# Patient Record
Sex: FEMALE | Race: WHITE | NOT HISPANIC OR LATINO | Employment: FULL TIME | URBAN - METROPOLITAN AREA
[De-identification: names, ages, dates, MRNs, and addresses within clinical notes are randomized per-mention and may not be internally consistent; named-entity substitution may affect disease eponyms.]

---

## 2017-05-30 DIAGNOSIS — Z36.89 ENCOUNTER TO ESTABLISH GESTATIONAL AGE USING ULTRASOUND: Primary | ICD-10-CM

## 2017-06-19 ENCOUNTER — INITIAL PRENATAL (OUTPATIENT)
Dept: OBSTETRICS AND GYNECOLOGY | Facility: CLINIC | Age: 22
End: 2017-06-19

## 2017-06-19 ENCOUNTER — APPOINTMENT (OUTPATIENT)
Dept: LAB | Facility: HOSPITAL | Age: 22
End: 2017-06-19
Attending: OBSTETRICS & GYNECOLOGY

## 2017-06-19 VITALS — WEIGHT: 192 LBS | DIASTOLIC BLOOD PRESSURE: 60 MMHG | SYSTOLIC BLOOD PRESSURE: 100 MMHG

## 2017-06-19 DIAGNOSIS — Z36.9 PRENATAL SCREENING ENCOUNTER: Primary | ICD-10-CM

## 2017-06-19 DIAGNOSIS — Z01.419 WELL WOMAN EXAM: ICD-10-CM

## 2017-06-19 DIAGNOSIS — O21.9 NAUSEA AND VOMITING IN PREGNANCY PRIOR TO 22 WEEKS GESTATION: ICD-10-CM

## 2017-06-19 DIAGNOSIS — Z34.01 ENCOUNTER FOR SUPERVISION OF NORMAL FIRST PREGNANCY IN FIRST TRIMESTER: ICD-10-CM

## 2017-06-19 LAB
ABO GROUP BLD: NORMAL
AMORPH URATE CRY URNS QL MICRO: ABNORMAL /HPF
BACTERIA UR QL AUTO: ABNORMAL /HPF
BASOPHILS # BLD AUTO: 0.01 10*3/MM3 (ref 0–0.2)
BASOPHILS NFR BLD AUTO: 0.1 % (ref 0–1)
BILIRUB UR QL STRIP: NEGATIVE
BLD GP AB SCN SERPL QL: NEGATIVE
CLARITY UR: ABNORMAL
COLOR UR: YELLOW
DEPRECATED RDW RBC AUTO: 42.9 FL (ref 37–54)
EOSINOPHIL # BLD AUTO: 0.06 10*3/MM3 (ref 0.1–0.3)
EOSINOPHIL NFR BLD AUTO: 0.7 % (ref 0–3)
ERYTHROCYTE [DISTWIDTH] IN BLOOD BY AUTOMATED COUNT: 13.3 % (ref 11.3–14.5)
EXTERNAL ABO GROUPING: NORMAL
EXTERNAL HEPATITIS B SURFACE ANTIGEN: NEGATIVE
EXTERNAL RH FACTOR: POSITIVE
EXTERNAL RUBELLA QUALITATIVE: NORMAL
EXTERNAL SYPHILIS RPR SCREEN: NORMAL
GLUCOSE UR STRIP-MCNC: NEGATIVE MG/DL
HBA1C MFR BLD: 5.3 % (ref 4.8–5.6)
HBV SURFACE AG SERPL QL IA: NORMAL
HCT VFR BLD AUTO: 38.2 % (ref 34.5–44)
HCV AB SER DONR QL: NORMAL
HGB BLD-MCNC: 12.8 G/DL (ref 11.5–15.5)
HGB UR QL STRIP.AUTO: NEGATIVE
HIV1 P24 AG SERPL QL IA: NORMAL
HIV1+2 AB SER QL: NORMAL
HYALINE CASTS UR QL AUTO: ABNORMAL /LPF
IMM GRANULOCYTES # BLD: 0.02 10*3/MM3 (ref 0–0.03)
IMM GRANULOCYTES NFR BLD: 0.2 % (ref 0–0.6)
KETONES UR QL STRIP: NEGATIVE
LEUKOCYTE ESTERASE UR QL STRIP.AUTO: ABNORMAL
LYMPHOCYTES # BLD AUTO: 2.18 10*3/MM3 (ref 0.6–4.8)
LYMPHOCYTES NFR BLD AUTO: 25.3 % (ref 24–44)
MCH RBC QN AUTO: 29.4 PG (ref 27–31)
MCHC RBC AUTO-ENTMCNC: 33.5 G/DL (ref 32–36)
MCV RBC AUTO: 87.6 FL (ref 80–99)
MONOCYTES # BLD AUTO: 0.77 10*3/MM3 (ref 0–1)
MONOCYTES NFR BLD AUTO: 8.9 % (ref 0–12)
NEUTROPHILS # BLD AUTO: 5.57 10*3/MM3 (ref 1.5–8.3)
NEUTROPHILS NFR BLD AUTO: 64.8 % (ref 41–71)
NITRITE UR QL STRIP: NEGATIVE
PH UR STRIP.AUTO: 7 [PH] (ref 5–8)
PLATELET # BLD AUTO: 285 10*3/MM3 (ref 150–450)
PMV BLD AUTO: 9.7 FL (ref 6–12)
PROT UR QL STRIP: NEGATIVE
RBC # BLD AUTO: 4.36 10*6/MM3 (ref 3.89–5.14)
RBC # UR: ABNORMAL /HPF
REF LAB TEST METHOD: ABNORMAL
RH BLD: POSITIVE
RUBV IGG SER QL: NORMAL
RUBV IGG SER-ACNC: 10.9 IU/ML
SP GR UR STRIP: 1.02 (ref 1–1.03)
SQUAMOUS #/AREA URNS HPF: ABNORMAL /HPF
TSH SERPL DL<=0.05 MIU/L-ACNC: 1.76 MIU/ML (ref 0.35–5.35)
UROBILINOGEN UR QL STRIP: ABNORMAL
WBC NRBC COR # BLD: 8.61 10*3/MM3 (ref 4.5–13.5)
WBC UR QL AUTO: ABNORMAL /HPF

## 2017-06-19 PROCEDURE — 83036 HEMOGLOBIN GLYCOSYLATED A1C: CPT | Performed by: OBSTETRICS & GYNECOLOGY

## 2017-06-19 PROCEDURE — G0432 EIA HIV-1/HIV-2 SCREEN: HCPCS | Performed by: OBSTETRICS & GYNECOLOGY

## 2017-06-19 PROCEDURE — 87086 URINE CULTURE/COLONY COUNT: CPT | Performed by: OBSTETRICS & GYNECOLOGY

## 2017-06-19 PROCEDURE — 81001 URINALYSIS AUTO W/SCOPE: CPT | Performed by: OBSTETRICS & GYNECOLOGY

## 2017-06-19 PROCEDURE — 87340 HEPATITIS B SURFACE AG IA: CPT | Performed by: OBSTETRICS & GYNECOLOGY

## 2017-06-19 PROCEDURE — 99204 OFFICE O/P NEW MOD 45 MIN: CPT | Performed by: OBSTETRICS & GYNECOLOGY

## 2017-06-19 PROCEDURE — 85025 COMPLETE CBC W/AUTO DIFF WBC: CPT | Performed by: OBSTETRICS & GYNECOLOGY

## 2017-06-19 PROCEDURE — 84443 ASSAY THYROID STIM HORMONE: CPT | Performed by: OBSTETRICS & GYNECOLOGY

## 2017-06-19 PROCEDURE — 86901 BLOOD TYPING SEROLOGIC RH(D): CPT | Performed by: OBSTETRICS & GYNECOLOGY

## 2017-06-19 PROCEDURE — 86900 BLOOD TYPING SEROLOGIC ABO: CPT | Performed by: OBSTETRICS & GYNECOLOGY

## 2017-06-19 PROCEDURE — 86803 HEPATITIS C AB TEST: CPT | Performed by: OBSTETRICS & GYNECOLOGY

## 2017-06-19 PROCEDURE — 80081 OBSTETRIC PANEL INC HIV TSTG: CPT | Performed by: OBSTETRICS & GYNECOLOGY

## 2017-06-19 PROCEDURE — 36415 COLL VENOUS BLD VENIPUNCTURE: CPT | Performed by: OBSTETRICS & GYNECOLOGY

## 2017-06-19 PROCEDURE — 86850 RBC ANTIBODY SCREEN: CPT | Performed by: OBSTETRICS & GYNECOLOGY

## 2017-06-19 RX ORDER — PRENATAL VIT/IRON FUM/FOLIC AC 27MG-0.8MG
1 TABLET ORAL DAILY
Qty: 30 TABLET | Refills: 12 | Status: SHIPPED | OUTPATIENT
Start: 2017-06-19 | End: 2018-03-12

## 2017-06-19 RX ORDER — ALBUTEROL SULFATE 2.5 MG/3ML
2.5 SOLUTION RESPIRATORY (INHALATION) EVERY 4 HOURS PRN
COMMUNITY

## 2017-06-19 RX ORDER — PROMETHAZINE HYDROCHLORIDE 25 MG/1
25 TABLET ORAL EVERY 6 HOURS PRN
Qty: 30 TABLET | Refills: 3 | Status: SHIPPED | OUTPATIENT
Start: 2017-06-19 | End: 2017-09-14

## 2017-06-19 NOTE — PROGRESS NOTES
@SUBJECTIVEBEGIN  Chief Complaint   Patient presents with   • Initial Prenatal Visit   • Nausea       Isidra Solano is a 22 y.o. year old .  Patient's last menstrual period was 2017 (exact date)..  She presents to be seen to initiate prenatal care.    She is currently having problems with nausea and occasional vomiting  As it relates to the pregnancy, she has no other  concerns    Past Medical History:   Diagnosis Date   • Asthma    ,   Past Surgical History:   Procedure Laterality Date   • CHOLECYSTECTOMY     ,   Family History   Problem Relation Age of Onset   • Coronary artery disease Father    • Hypertension Father    • Diabetes Paternal Grandfather    • Breast cancer Paternal Grandmother    • Colon cancer Maternal Grandmother    • Ovarian cancer Maternal Aunt    ,   Social History   Substance Use Topics   • Smoking status: Never Smoker   • Smokeless tobacco: None   • Alcohol use No   ,   (Not in a hospital admission) and Allergies:  Review of patient's allergies indicates no known allergies.    Smoking status: Never Smoker                                                                 Smokeless status: Not on file                         The following portions of the patient's history were reviewed and updated as appropriate:vital signs, allergies, current medications, past medical history, past social history, past surgical history and problem list.    Objective     Imaging   No data reviewed    Review of Systems - History obtained from the patient  General ROS: negative  Psychological ROS: negative  ENT ROS: positive for - nose bleeds  Allergy and Immunology ROS: negative  Hematological and Lymphatic ROS: positive for - bruising  Endocrine ROS: negative  Breast ROS: negative for breast lumps  Respiratory ROS: positive for - wheezing  Cardiovascular ROS: no chest pain or dyspnea on exertion  Gastrointestinal ROS: positive for - nausea/vomiting  Genito-Urinary ROS: no dysuria, trouble voiding, or  hematuria  Musculoskeletal ROS: negative  Neurological ROS: positive for - history of syncope   Dermatological ROS: negative     Physical Exam:  See Episode    Assessment/Plan   ASSESSMENT  Isidra was seen today for initial prenatal visit and nausea.    Diagnoses and all orders for this visit:    Prenatal screening encounter  -     Obstetric Panel  -     HIV-1 / O / 2 Ag / Antibody 4th Generation  -     Chlamydia trachomatis, Neisseria gonorrhoeae, Trichomonas vaginalis, PCR  -     Hemoglobin A1c  -     Hepatitis C Antibody  -     TSH  -     Urinalysis With / Culture If Indicated    Well woman exam  -     Liquid-based Pap Smear, Screening    Encounter for supervision of normal first pregnancy in first trimester    Nausea and vomiting in pregnancy prior to 22 weeks gestation  -     promethazine (PHENERGAN) 25 MG tablet; Take 1 tablet by mouth Every 6 (Six) Hours As Needed for Nausea or Vomiting.    Other orders  -     Cancel: Varicella Zoster Antibody, IgG; Future  -     Cancel: Urinalysis With Microscopic; Future  -     Cancel: Urine Culture; Future  -     Cancel: IGP, CtNg, Rfx Aptima HPV ASCU; Future  -     Prenatal Vit-Fe Fumarate-FA (PRENATAL VITAMIN 27-0.8) 27-0.8 MG tablet tablet; Take 1 tablet by mouth Daily.        PLAN  1. Tests ordered today:  Orders Placed This Encounter   Procedures   • Chlamydia trachomatis, Neisseria gonorrhoeae, Trichomonas vaginalis, PCR   • Obstetric Panel   • HIV-1 / O / 2 Ag / Antibody 4th Generation   • Hemoglobin A1c   • Hepatitis C Antibody   • TSH   • Urinalysis With / Culture If Indicated     2. Medications prescribed today:  New Medications Ordered This Visit   Medications   • albuterol (PROVENTIL) (2.5 MG/3ML) 0.083% nebulizer solution     Sig: Take 2.5 mg by nebulization Every 4 (Four) Hours As Needed for Wheezing.   • Prenatal Vit-Fe Fumarate-FA (PRENATAL VITAMIN 27-0.8) 27-0.8 MG tablet tablet     Sig: Take 1 tablet by mouth Daily.     Dispense:  30 tablet     Refill:   12   • promethazine (PHENERGAN) 25 MG tablet     Sig: Take 1 tablet by mouth Every 6 (Six) Hours As Needed for Nausea or Vomiting.     Dispense:  30 tablet     Refill:  3     3. Information reviewed: exercise in pregnancy, nutrition in pregnancy, weight gain in pregnancy, work and travel restrictions during pregnancy, list of OTC medications acceptable in pregnancy and call coverage groups  4. Genetic testing reviewed: unsure  5. The problem list for pregnancy was initiated today    Follow up: 2 week(s)       This note was electronically signed.    Wilber Gomez MD   June 19, 2017

## 2017-06-21 LAB
BACTERIA SPEC AEROBE CULT: NORMAL
RPR SER QL: NORMAL

## 2017-07-05 ENCOUNTER — ROUTINE PRENATAL (OUTPATIENT)
Dept: OBSTETRICS AND GYNECOLOGY | Facility: CLINIC | Age: 22
End: 2017-07-05

## 2017-07-05 VITALS — WEIGHT: 193 LBS | DIASTOLIC BLOOD PRESSURE: 70 MMHG | SYSTOLIC BLOOD PRESSURE: 124 MMHG

## 2017-07-05 DIAGNOSIS — Z34.01 ENCOUNTER FOR SUPERVISION OF NORMAL FIRST PREGNANCY IN FIRST TRIMESTER: Primary | ICD-10-CM

## 2017-07-05 DIAGNOSIS — O21.9 NAUSEA AND VOMITING IN PREGNANCY PRIOR TO 22 WEEKS GESTATION: ICD-10-CM

## 2017-07-05 PROCEDURE — 99213 OFFICE O/P EST LOW 20 MIN: CPT | Performed by: OBSTETRICS & GYNECOLOGY

## 2017-07-05 NOTE — PROGRESS NOTES
No results found for: ABORH, LABANTI, ABID    Chief Complaint   Patient presents with   • Routine Prenatal Visit     No complaints. Nausea better       Today Isidra has no specific complaints  See ob flowsheet for physical exam.    Prenatal Assessment  Fetal Heart Rate: 176  Movement: Absent  Prenatal Vitals  BP: 124/70  Weight: 193 lb (87.5 kg)  Edema  LLE Edema: None  RLE Edema: None  Facial Edema: None     Tests done today: none  At the time of the next visit, she will need to have none    Impression:   Encounter Diagnoses   Name Primary?   • Encounter for supervision of normal first pregnancy in first trimester Yes   • Nausea and vomiting in pregnancy prior to 22 weeks gestation        Plan: Return 4 weeks    This note was electronically signed.    Wilber Gomez MD

## 2017-08-02 ENCOUNTER — ROUTINE PRENATAL (OUTPATIENT)
Dept: OBSTETRICS AND GYNECOLOGY | Facility: CLINIC | Age: 22
End: 2017-08-02

## 2017-08-02 VITALS — DIASTOLIC BLOOD PRESSURE: 78 MMHG | WEIGHT: 195 LBS | SYSTOLIC BLOOD PRESSURE: 140 MMHG

## 2017-08-02 DIAGNOSIS — Z34.01 ENCOUNTER FOR SUPERVISION OF NORMAL FIRST PREGNANCY IN FIRST TRIMESTER: Primary | ICD-10-CM

## 2017-08-02 DIAGNOSIS — R51.9 PREGNANCY HEADACHE IN FIRST TRIMESTER: ICD-10-CM

## 2017-08-02 DIAGNOSIS — O26.891 PREGNANCY HEADACHE IN FIRST TRIMESTER: ICD-10-CM

## 2017-08-02 PROCEDURE — 99213 OFFICE O/P EST LOW 20 MIN: CPT | Performed by: OBSTETRICS & GYNECOLOGY

## 2017-08-02 NOTE — PROGRESS NOTES
No results found for: ABORH, LABANTI, ABID    Chief Complaint   Patient presents with   • Routine Prenatal Visit     Pt. complains of headaches/migraines.  She states she has had them before she was pregnant and can no longer take the medication she was on because of pregnancy.  Tylenol does not work well for the headaches.        Today Isidra complains of headache  See ob flowsheet for physical exam.    Prenatal Assessment  Fetal Heart Rate: 160  Movement: Absent  Prenatal Vitals  BP: 140/78  Weight: 195 lb (88.5 kg)  Urine Glucose/Protein  Urine Glucose: Negative  Urine Protein: Negative     Tests done today: none  At the time of the next visit, she will need to have none    Impression:   Encounter Diagnoses   Name Primary?   • Encounter for supervision of normal first pregnancy in first trimester Yes   • Pregnancy headache in first trimester        Plan: Return 2 weeks, will refer to Neurology for suggestions     This note was electronically signed.    Wilber Gomez MD

## 2017-08-07 ENCOUNTER — HOSPITAL ENCOUNTER (EMERGENCY)
Facility: HOSPITAL | Age: 22
Discharge: LEFT BEFORE DISCHARGE | End: 2017-08-07

## 2017-08-07 VITALS
HEART RATE: 77 BPM | RESPIRATION RATE: 18 BRPM | HEIGHT: 66 IN | WEIGHT: 192 LBS | BODY MASS INDEX: 30.86 KG/M2 | SYSTOLIC BLOOD PRESSURE: 120 MMHG | DIASTOLIC BLOOD PRESSURE: 71 MMHG | OXYGEN SATURATION: 99 % | TEMPERATURE: 98.1 F

## 2017-08-07 LAB
HOLD SPECIMEN: NORMAL
HOLD SPECIMEN: NORMAL
WHOLE BLOOD HOLD SPECIMEN: NORMAL
WHOLE BLOOD HOLD SPECIMEN: NORMAL

## 2017-08-07 PROCEDURE — 99211 OFF/OP EST MAY X REQ PHY/QHP: CPT

## 2017-08-17 ENCOUNTER — ROUTINE PRENATAL (OUTPATIENT)
Dept: OBSTETRICS AND GYNECOLOGY | Facility: CLINIC | Age: 22
End: 2017-08-17

## 2017-08-17 VITALS — WEIGHT: 199 LBS | BODY MASS INDEX: 32.12 KG/M2 | SYSTOLIC BLOOD PRESSURE: 120 MMHG | DIASTOLIC BLOOD PRESSURE: 70 MMHG

## 2017-08-17 DIAGNOSIS — Z36.89 ENCOUNTER FOR FETAL ANATOMIC SURVEY: ICD-10-CM

## 2017-08-17 DIAGNOSIS — O22.42 HEMORRHOIDS DURING PREGNANCY IN SECOND TRIMESTER: ICD-10-CM

## 2017-08-17 DIAGNOSIS — R51.9 PREGNANCY HEADACHE IN FIRST TRIMESTER: ICD-10-CM

## 2017-08-17 DIAGNOSIS — Z34.02 ENCOUNTER FOR SUPERVISION OF NORMAL FIRST PREGNANCY IN SECOND TRIMESTER: Primary | ICD-10-CM

## 2017-08-17 DIAGNOSIS — O26.891 PREGNANCY HEADACHE IN FIRST TRIMESTER: ICD-10-CM

## 2017-08-17 PROCEDURE — 99213 OFFICE O/P EST LOW 20 MIN: CPT | Performed by: OBSTETRICS & GYNECOLOGY

## 2017-08-17 NOTE — PROGRESS NOTES
No results found for: ABORH, LABANTI, ABID    Chief Complaint   Patient presents with   • Routine Prenatal Visit     C/O headaches and hemorroids       Today Isidra complains of headache and hemorrhoids but improving  See ob flowsheet for physical exam.    Prenatal Assessment  Fetal Heart Rate: 148  Movement: Absent  Prenatal Vitals  BP: 120/70  Weight: 199 lb (90.3 kg)  Urine Glucose/Protein  Urine Glucose: Negative  Urine Protein: Trace  Edema  LLE Edema: None  RLE Edema: None  Facial Edema: None     Tests done today: none  At the time of the next visit, she will need to have U/S for anatomic screening - at [facility]    Impression:   Encounter Diagnoses   Name Primary?   • Encounter for supervision of normal first pregnancy in second trimester Yes   • Pregnancy headache in first trimester    • Hemorrhoids during pregnancy in second trimester    • Encounter for fetal anatomic survey        Plan: Return 4 weeks, ultrasound at [facility] on return, both hemorrhoids and headaches are improving.  Patient has appointment with neurology on 9/14/2017.    This note was electronically signed.    Wilber Gomez MD

## 2017-09-14 ENCOUNTER — OFFICE VISIT (OUTPATIENT)
Dept: OBSTETRICS AND GYNECOLOGY | Facility: HOSPITAL | Age: 22
End: 2017-09-14

## 2017-09-14 ENCOUNTER — OFFICE VISIT (OUTPATIENT)
Dept: NEUROLOGY | Facility: CLINIC | Age: 22
End: 2017-09-14

## 2017-09-14 ENCOUNTER — ROUTINE PRENATAL (OUTPATIENT)
Dept: OBSTETRICS AND GYNECOLOGY | Facility: CLINIC | Age: 22
End: 2017-09-14

## 2017-09-14 ENCOUNTER — HOSPITAL ENCOUNTER (OUTPATIENT)
Dept: WOMENS IMAGING | Facility: HOSPITAL | Age: 22
Discharge: HOME | End: 2017-09-14
Attending: OBSTETRICS & GYNECOLOGY | Admitting: OBSTETRICS & GYNECOLOGY

## 2017-09-14 VITALS
BODY MASS INDEX: 33.27 KG/M2 | HEIGHT: 66 IN | WEIGHT: 207 LBS | DIASTOLIC BLOOD PRESSURE: 70 MMHG | SYSTOLIC BLOOD PRESSURE: 116 MMHG | RESPIRATION RATE: 16 BRPM | HEART RATE: 88 BPM

## 2017-09-14 VITALS — DIASTOLIC BLOOD PRESSURE: 78 MMHG | BODY MASS INDEX: 33.41 KG/M2 | WEIGHT: 207 LBS | SYSTOLIC BLOOD PRESSURE: 129 MMHG

## 2017-09-14 VITALS — BODY MASS INDEX: 33.41 KG/M2 | SYSTOLIC BLOOD PRESSURE: 124 MMHG | DIASTOLIC BLOOD PRESSURE: 80 MMHG | WEIGHT: 207 LBS

## 2017-09-14 DIAGNOSIS — G43.709 CHRONIC MIGRAINE WITHOUT AURA WITHOUT STATUS MIGRAINOSUS, NOT INTRACTABLE: Primary | ICD-10-CM

## 2017-09-14 DIAGNOSIS — Z34.02 ENCOUNTER FOR SUPERVISION OF NORMAL FIRST PREGNANCY IN SECOND TRIMESTER: Primary | ICD-10-CM

## 2017-09-14 DIAGNOSIS — Z34.90 PREGNANCY, UNSPECIFIED GESTATIONAL AGE: Primary | ICD-10-CM

## 2017-09-14 DIAGNOSIS — Z36.89 ENCOUNTER FOR FETAL ANATOMIC SURVEY: ICD-10-CM

## 2017-09-14 DIAGNOSIS — O26.892 HEADACHE IN PREGNANCY, ANTEPARTUM, SECOND TRIMESTER: ICD-10-CM

## 2017-09-14 DIAGNOSIS — R51.9 CHRONIC DAILY HEADACHE: ICD-10-CM

## 2017-09-14 DIAGNOSIS — R51.9 HEADACHE IN PREGNANCY, ANTEPARTUM, SECOND TRIMESTER: ICD-10-CM

## 2017-09-14 PROCEDURE — 99212 OFFICE O/P EST SF 10 MIN: CPT | Performed by: OBSTETRICS & GYNECOLOGY

## 2017-09-14 PROCEDURE — 76811 OB US DETAILED SNGL FETUS: CPT | Performed by: OBSTETRICS & GYNECOLOGY

## 2017-09-14 PROCEDURE — 76811 OB US DETAILED SNGL FETUS: CPT

## 2017-09-14 PROCEDURE — 99215 OFFICE O/P EST HI 40 MIN: CPT | Performed by: NURSE PRACTITIONER

## 2017-09-14 RX ORDER — MAGNESIUM OXIDE 400 MG/1
400 TABLET ORAL NIGHTLY
Qty: 30 TABLET | Refills: 11 | Status: SHIPPED | OUTPATIENT
Start: 2017-09-14 | End: 2018-01-24

## 2017-09-14 NOTE — PROGRESS NOTES
No results found for: ABORH, LABANTI, ABID    Chief Complaint   Patient presents with   • Routine Prenatal Visit     Pt. complains of still having headaches, she just came from the [facility].        Today Isidra complains of headache and pt saw Neurology today, note is in the chart  See ob flowsheet for physical exam.    Prenatal Assessment  Fetal Heart Rate: 148  Movement: Present  Prenatal Vitals  BP: 124/80  Weight: 207 lb (93.9 kg)  Urine Glucose/Protein  Urine Glucose: Negative  Urine Protein: Negative  Vaginal Drainage  Draining Fluid: No  Edema  LLE Edema: None  RLE Edema: None  Facial Edema: None     Tests done today: U/S for anatomic screening - at [facility]  At the time of the next visit, she will need to have none    Impression:   Encounter Diagnoses   Name Primary?   • Encounter for supervision of normal first pregnancy in second trimester Yes   • Headache in pregnancy, antepartum, second trimester        Plan: Return 4 weeks    This note was electronically signed.    Wilber Gomez MD

## 2017-09-14 NOTE — PROGRESS NOTES
Subjective:     Patient ID: Isidra CURRY is a 22 y.o. female.    HPI:     This is a 22-year-old female who presents for initial neurological evaluation of chronic daily headaches present for the past 5 years with chronic migraines which has slightly worsened since becoming pregnant with her first child.  She is currently 19 weeks and 5 days pregnant.  She was referred by her OB Dr. Gomez for further management of migraines during pregnancy.  Prior to pregnancy she was taking Topamax 50 mg for migraine prevention and this worked very well for her and she has been off this since her pregnancy and feels like her headaches are worsened due to limited medication she can take.  She did have an MRI of the brain without contrast in 2012 which was within normal limits.  She is accompanied by her  during today's visit.  She has been taking Tylenol over-the-counter as needed for headaches as well as laying in a dark room when she gets the severe headaches.    Headache    This is a chronic (currently 19 weeks 5 days pregnant with her first Baby. Referred by Dr. Gomez Her OB for evaluation of migraines.) problem. The current episode started more than 1 year ago (5+ years.). The problem occurs daily (has daily headache 6/10 and 3 severe headaches a week 9/10). The problem has been gradually worsening (slightly more frequent during pregnancy since being off the Topamax). The pain is located in the bilateral and frontal region. The pain does not radiate. The pain quality is similar to prior headaches. The quality of the pain is described as throbbing and sharp. The pain is at a severity of 9/10. Associated symptoms include back pain, phonophobia, photophobia and a visual change (occasional with migraines). Pertinent negatives include no abdominal pain, blurred vision, coughing, dizziness, ear pain, eye pain, fever, hearing loss, nausea, neck pain, numbness, rhinorrhea, seizures, sore throat, vomiting or weakness.  Associated symptoms comments: MRI brain without contrast 2/28/2012 [facility] imaging and radiology report reviewed in office today.. The symptoms are aggravated by emotional stress and bright light (perfume, cigarette odors). She has tried acetaminophen, darkened room, cold packs, Excedrin and NSAIDs (was previously on topamax prior to pregnancy and they were well controlled) for the symptoms. The treatment provided mild relief. Her past medical history is significant for migraine headaches, migraines in the family (sister) and obesity. There is no history of cancer, cluster headaches, hypertension, immunosuppression, pseudotumor cerebri, recent head traumas, sinus disease or TMJ.      The following portions of the patient's history were reviewed and updated as appropriate: allergies, current medications, past family history, past medical history, past social history, past surgical history and problem list.    Past Medical History:   Diagnosis Date   • Anxiety    • Asthma    • Depression    • Migraine        Past Surgical History:   Procedure Laterality Date   • CHOLECYSTECTOMY         Social History     Social History   • Marital status: Single     Spouse name: N/A   • Number of children: N/A   • Years of education: N/A     Occupational History   • Not on file.     Social History Main Topics   • Smoking status: Never Smoker   • Smokeless tobacco: Not on file   • Alcohol use Yes   • Drug use: No   • Sexual activity: Yes     Partners: Male     Other Topics Concern   • Not on file     Social History Narrative       Family History   Problem Relation Age of Onset   • Coronary artery disease Father    • Hypertension Father    • Seizures Father    • Diabetes Father    • Diabetes Paternal Grandfather    • Breast cancer Paternal Grandmother    • Colon cancer Maternal Grandmother    • Ovarian cancer Maternal Aunt    • Multiple sclerosis Sister    • Migraines Sister    • Seizures Brother    • Coronary artery disease Maternal Uncle     • Seizures Maternal Uncle    • Diabetes Maternal Uncle    • Mental illness Maternal Uncle    • Coronary artery disease Paternal Aunt    • Diabetes Paternal Aunt    • Mental retardation Paternal Uncle    • Coronary artery disease Maternal Grandfather    • Stroke Maternal Grandfather    • Diabetes Maternal Grandfather         Review of Systems   Constitutional: Negative for chills, fatigue, fever and unexpected weight change.   HENT: Positive for nosebleeds. Negative for ear pain, hearing loss, rhinorrhea and sore throat.    Eyes: Positive for photophobia and visual disturbance. Negative for blurred vision, pain, discharge and itching.   Respiratory: Negative for cough, chest tightness, shortness of breath and wheezing.    Cardiovascular: Negative for chest pain, palpitations and leg swelling.   Gastrointestinal: Negative for abdominal pain, blood in stool, constipation, diarrhea, nausea and vomiting.   Genitourinary: Negative for dysuria, frequency, hematuria and urgency.   Musculoskeletal: Positive for back pain and neck stiffness. Negative for arthralgias, gait problem, joint swelling, myalgias and neck pain.   Skin: Negative for rash and wound.   Allergic/Immunologic: Negative for environmental allergies and food allergies.   Neurological: Positive for light-headedness and headaches. Negative for dizziness, tremors, seizures, syncope, speech difficulty, weakness and numbness.   Hematological: Negative for adenopathy. Bruises/bleeds easily.   Psychiatric/Behavioral: Negative for agitation, confusion, decreased concentration, hallucinations, sleep disturbance and suicidal ideas. The patient is not nervous/anxious.         Objective:    Neurologic Exam     Mental Status   Oriented to person, place, and time.   Registration: recalls 3 of 3 objects. Recall at 5 minutes: recalls 3 of 3 objects. Follows 3 step commands.   Attention: normal. Concentration: normal.   Speech: speech is normal   Level of consciousness:  alert  Knowledge: good and consistent with education. Able to perform simple calculations.   Able to name object. Able to read. Able to repeat. Able to write. Normal comprehension.     Cranial Nerves   Cranial nerves II through XII intact.     Motor Exam   Muscle bulk: normal  Overall muscle tone: normal    Strength   Strength 5/5 throughout.     Sensory Exam   Light touch normal.   Vibration normal.   Proprioception normal.   Pinprick normal.     Gait, Coordination, and Reflexes     Gait  Gait: normal    Coordination   Romberg: negative  Finger to nose coordination: normal  Heel to shin coordination: normal    Tremor   Resting tremor: absent  Intention tremor: absent  Action tremor: absent    Reflexes   Right brachioradialis: 2+  Left brachioradialis: 2+  Right biceps: 2+  Left biceps: 2+  Right triceps: 2+  Left triceps: 2+  Right patellar: 2+  Left patellar: 2+  Right achilles: 2+  Left achilles: 2+  Right : 2+  Left : 2+  Right plantar: normal  Left plantar: normal  Right Cline: absent  Left Cline: absent  Right ankle clonus: absent  Left ankle clonus: absent      Physical Exam   Constitutional: She is oriented to person, place, and time. She appears well-developed and well-nourished.   Eyes:   Fundoscopic exam:       The right eye shows no AV nicking, no exudate, no hemorrhage and no papilledema. The right eye shows red reflex.        The left eye shows no AV nicking, no exudate, no hemorrhage and no papilledema. The left eye shows red reflex.   Neck: Normal range of motion and full passive range of motion without pain. Neck supple.   Cardiovascular: Normal rate, regular rhythm, S1 normal, S2 normal and normal heart sounds.    Pulmonary/Chest: Effort normal and breath sounds normal.   Neurological: She is oriented to person, place, and time. She has normal strength. She has a normal Finger-Nose-Finger Test, a normal Heel to Recinos Test and a normal Romberg Test. Gait normal.   Reflex Scores:        Tricep reflexes are 2+ on the right side and 2+ on the left side.       Bicep reflexes are 2+ on the right side and 2+ on the left side.       Brachioradialis reflexes are 2+ on the right side and 2+ on the left side.       Patellar reflexes are 2+ on the right side and 2+ on the left side.       Achilles reflexes are 2+ on the right side and 2+ on the left side.  Psychiatric: Her speech is normal and behavior is normal. Judgment and thought content normal. Cognition and memory are normal. She exhibits a depressed mood.       Assessment/Plan:       Isidra was seen today for headache.    Diagnoses and all orders for this visit:    Chronic migraine without aura without status migrainosus, not intractable  -     magnesium oxide (MAG-OX) 400 MG tablet; Take 1 tablet by mouth Every Night.    Chronic daily headache  -     magnesium oxide (MAG-OX) 400 MG tablet; Take 1 tablet by mouth Every Night.         I discussed different treatment options while pregnant.  She would like to start out with the most conservative option of magnesium oxide 400 mg daily at bedtime for migraine prevention.  I've explained that if this does not improve her symptoms in the next 4 weeks that we would recommend starting propranolol 20 mg twice a day for migraine and headache prevention if approved by her OB.  She wishes to continue taking the Tylenol as needed for headaches.  I did discuss with her that we could write a limited supply of Fioricet if approved by her OB to take up to a maximum of 5-6 per month for severe headaches but also preventing medication overuse headaches and decreasing risk to the fetus; she wants to wait on this. I will plan to see her back in 6 months which will be about 6 weeks after delivery of her baby (due 2/3/17) to manage migraines after pregnancy. If she is breastfeeding we have discussed propranolol would continue to be the preventive drug of choice, if she is not breastfeeding there are more options. She  will be able to add NSAIDs as well as limited triptans with breastfeeding for abortive management of migraines. Would not recommend additional imaging of brain since prior MRI brain WNL and these are chronic. She verbalizes understanding and agrees with plan. Reviewed medications, potential side effects and signs and symptoms to report. Discussed risk versus benefits of treatment plan with patient and/or family-including medications, labs and radiology that may be ordered. Addressed questions and concerns during visit. Patient and/or family verbalized understanding and agree with plan. Recommended limiting caffeine intake to 1 serving per day, avoiding artificial sweeteners and preservatives and pushing fluids/staying well hydrated and eating a well balanced diet.    During this visit the following were done:  Labs Reviewed [x]    Labs Ordered []    Radiology Reports Reviewed [x]    Radiology Ordered []    PCP Records Reviewed []    Referring Provider Records Reviewed [x]    ER Records Reviewed []    Hospital Records Reviewed []    History Obtained From Family []    Radiology Images Reviewed [x]    Other Reviewed []    Records Requested []      EMR Dragon/Transcription Disclaimer:  Much of this encounter note is an electronic transcription of spoken language to printed text. Electronic transcription of spoken language may permit erroneous words or phrases to be inadvertently transcribed. Although I have reviewed the note for such errors, some may still exist in this documentation.      Consuelo Weaver, APRN  9/14/2017

## 2017-09-14 NOTE — PROGRESS NOTES
To see Dr. Gomez today.  Complains of HA today, hx of migraines. Saw neurologist today, OT Mag-Ox.

## 2017-10-12 ENCOUNTER — ROUTINE PRENATAL (OUTPATIENT)
Dept: OBSTETRICS AND GYNECOLOGY | Facility: CLINIC | Age: 22
End: 2017-10-12

## 2017-10-12 VITALS — SYSTOLIC BLOOD PRESSURE: 130 MMHG | WEIGHT: 213 LBS | BODY MASS INDEX: 34.38 KG/M2 | DIASTOLIC BLOOD PRESSURE: 80 MMHG

## 2017-10-12 DIAGNOSIS — Z34.02 ENCOUNTER FOR SUPERVISION OF NORMAL FIRST PREGNANCY IN SECOND TRIMESTER: Primary | ICD-10-CM

## 2017-10-12 DIAGNOSIS — Z36.9 PRENATAL SCREENING ENCOUNTER: ICD-10-CM

## 2017-10-12 PROCEDURE — 99212 OFFICE O/P EST SF 10 MIN: CPT | Performed by: OBSTETRICS & GYNECOLOGY

## 2017-10-12 NOTE — PROGRESS NOTES
No results found for: ABORH, LABANTI, ABID    Chief Complaint   Patient presents with   • Routine Prenatal Visit     No complaints       Today Isidra has no specific complaints  See ob flowsheet for physical exam.    Prenatal Assessment  Fetal Heart Rate: 150   Movement: Present  Prenatal Vitals  BP: 130/80  Weight: 213 lb (96.6 kg)  Urine Glucose/Protein  Urine Glucose: Negative  Urine Protein: Negative  Edema  LLE Edema: None  RLE Edema: None  Facial Edema: None     Tests done today: none  At the time of the next visit, she will need to have GCT    Impression:   Encounter Diagnoses   Name Primary?   • Encounter for supervision of normal first pregnancy in second trimester Yes   • Prenatal screening encounter        Plan: Return 3 weeks, headaches have improved with Magnesium     This note was electronically signed.    Wilber Gomez MD

## 2017-11-01 ENCOUNTER — RESULTS ENCOUNTER (OUTPATIENT)
Dept: OBSTETRICS AND GYNECOLOGY | Facility: CLINIC | Age: 22
End: 2017-11-01

## 2017-11-01 ENCOUNTER — APPOINTMENT (OUTPATIENT)
Dept: LAB | Facility: HOSPITAL | Age: 22
End: 2017-11-01

## 2017-11-01 ENCOUNTER — ROUTINE PRENATAL (OUTPATIENT)
Dept: OBSTETRICS AND GYNECOLOGY | Facility: CLINIC | Age: 22
End: 2017-11-01

## 2017-11-01 VITALS — SYSTOLIC BLOOD PRESSURE: 120 MMHG | BODY MASS INDEX: 35.02 KG/M2 | WEIGHT: 217 LBS | DIASTOLIC BLOOD PRESSURE: 70 MMHG

## 2017-11-01 DIAGNOSIS — R09.81 NASAL SINUS CONGESTION: ICD-10-CM

## 2017-11-01 DIAGNOSIS — Z36.9 PRENATAL SCREENING ENCOUNTER: ICD-10-CM

## 2017-11-01 DIAGNOSIS — Z34.02 ENCOUNTER FOR SUPERVISION OF NORMAL FIRST PREGNANCY IN SECOND TRIMESTER: Primary | ICD-10-CM

## 2017-11-01 LAB
EXTERNAL GTT 1 HOUR: 134
GLUCOSE 1H P 100 G GLC PO SERPL-MCNC: 134 MG/DL (ref 65–199)

## 2017-11-01 PROCEDURE — 82950 GLUCOSE TEST: CPT | Performed by: OBSTETRICS & GYNECOLOGY

## 2017-11-01 PROCEDURE — 99213 OFFICE O/P EST LOW 20 MIN: CPT | Performed by: OBSTETRICS & GYNECOLOGY

## 2017-11-01 PROCEDURE — 36415 COLL VENOUS BLD VENIPUNCTURE: CPT | Performed by: OBSTETRICS & GYNECOLOGY

## 2017-11-01 RX ORDER — CETIRIZINE HYDROCHLORIDE, PSEUDOEPHEDRINE HYDROCHLORIDE 5; 120 MG/1; MG/1
1 TABLET, FILM COATED, EXTENDED RELEASE ORAL 2 TIMES DAILY
Qty: 20 TABLET | Refills: 3 | Status: SHIPPED | OUTPATIENT
Start: 2017-11-01 | End: 2018-01-24

## 2017-11-01 NOTE — PROGRESS NOTES
No results found for: ABORH, LABANTI, ABID    Chief Complaint   Patient presents with   • Routine Prenatal Visit     C/O sinus congestion       Today Isidra complains of nasal congestion   See ob flowsheet for physical exam.    Prenatal Assessment  Fetal Heart Rate: 154  Movement: Present  Prenatal Vitals  BP: 120/70  Weight: 217 lb (98.4 kg)  Urine Glucose/Protein  Urine Glucose: Negative  Urine Protein: Negative  Edema  LLE Edema: None  RLE Edema: None  Facial Edema: None     Tests done today: GCT  At the time of the next visit, she will need to have none    Impression:   Encounter Diagnoses   Name Primary?   • Encounter for supervision of normal first pregnancy in second trimester Yes   • Nasal sinus congestion        Plan: Return 4 weeks, Zyrtec D for nasal problem    This note was electronically signed.    Wilber Gomez MD

## 2017-11-03 ENCOUNTER — TELEPHONE (OUTPATIENT)
Dept: OBSTETRICS AND GYNECOLOGY | Facility: CLINIC | Age: 22
End: 2017-11-03

## 2017-11-03 DIAGNOSIS — Z36.9 ANTENATAL SCREENING ENCOUNTER: Primary | ICD-10-CM

## 2017-11-03 NOTE — TELEPHONE ENCOUNTER
The patient's one hr Glucola was 134.  Was informed of the results and a 3R GTT was scheduled.    Wilber Gomez MD

## 2017-11-06 ENCOUNTER — TELEPHONE (OUTPATIENT)
Dept: OBSTETRICS AND GYNECOLOGY | Facility: CLINIC | Age: 22
End: 2017-11-06

## 2017-11-06 ENCOUNTER — LAB (OUTPATIENT)
Dept: LAB | Facility: HOSPITAL | Age: 22
End: 2017-11-06

## 2017-11-06 DIAGNOSIS — Z36.9 ANTENATAL SCREENING ENCOUNTER: ICD-10-CM

## 2017-11-06 DIAGNOSIS — Z36.9 RESEARCH REQUESTED ANTENATAL ULTRASOUND SCAN: Primary | ICD-10-CM

## 2017-11-06 LAB
GLUCOSE 1H P 100 G GLC PO SERPL-MCNC: 152 MG/DL (ref 65–199)
GLUCOSE 2H P 100 G GLC PO SERPL-MCNC: 111 MG/DL (ref 65–139)
GLUCOSE 3H P 100 G GLC PO SERPL-MCNC: 101 MG/DL (ref 65–109)
GLUCOSE BLDC-MCNC: 109 MG/DL (ref 75–125)
GLUCOSE BLDC-MCNC: 96 MG/DL
GLUCOSE P FAST SERPL-MCNC: 96 MG/DL (ref 65–99)

## 2017-11-06 PROCEDURE — 82962 GLUCOSE BLOOD TEST: CPT | Performed by: OBSTETRICS & GYNECOLOGY

## 2017-11-06 PROCEDURE — 82952 GTT-ADDED SAMPLES: CPT | Performed by: OBSTETRICS & GYNECOLOGY

## 2017-11-06 PROCEDURE — 82951 GLUCOSE TOLERANCE TEST (GTT): CPT | Performed by: OBSTETRICS & GYNECOLOGY

## 2017-11-06 PROCEDURE — 36415 COLL VENOUS BLD VENIPUNCTURE: CPT

## 2017-11-10 ENCOUNTER — TELEPHONE (OUTPATIENT)
Dept: OBSTETRICS AND GYNECOLOGY | Facility: CLINIC | Age: 22
End: 2017-11-10

## 2017-11-10 NOTE — TELEPHONE ENCOUNTER
Patient called and reported that she had a sudden episode of bright red rectal bleeding.  This was associated with diarrhea.  The patient was reassured that this was probably hemorrhoids related to the pregnancy.  The bleeding would be self-limited but if it continued to proceed to the emergency room or labor Hare.    Wilber Gomez MD

## 2017-11-18 ENCOUNTER — TELEPHONE (OUTPATIENT)
Dept: OBSTETRICS AND GYNECOLOGY | Facility: CLINIC | Age: 22
End: 2017-11-18

## 2017-11-18 NOTE — TELEPHONE ENCOUNTER
"Received call from patient through [facility] from patient with URI symptoms w/o SOB or chest pain.  Wants work excuse.  Has asthma and has tried inhalers.  Explained usually these are viral in nature and ABx won't help.  Suggested she try an OTC Mucinex D along with an antihistamine (ie - Allegra).   Explained that I could not provide a work excuse for a simple URI.    She asked \"If you won't write a work excuse should I go to the ER\".  I told her the symptoms she was describing did not warrant a trip to the ER.  She then said \"so you are telling me I should just lose my job?\".  I told her I was giving her medical advice to the problem that she described.  If she felt the need to be seen, she should go to an outpatient [facility] for evaluation.   "

## 2017-11-29 ENCOUNTER — ROUTINE PRENATAL (OUTPATIENT)
Dept: OBSTETRICS AND GYNECOLOGY | Facility: CLINIC | Age: 22
End: 2017-11-29

## 2017-11-29 VITALS — DIASTOLIC BLOOD PRESSURE: 70 MMHG | BODY MASS INDEX: 35.35 KG/M2 | SYSTOLIC BLOOD PRESSURE: 114 MMHG | WEIGHT: 219 LBS

## 2017-11-29 DIAGNOSIS — Z34.03 ENCOUNTER FOR SUPERVISION OF NORMAL FIRST PREGNANCY IN THIRD TRIMESTER: Primary | ICD-10-CM

## 2017-11-29 PROCEDURE — 99213 OFFICE O/P EST LOW 20 MIN: CPT | Performed by: OBSTETRICS & GYNECOLOGY

## 2017-11-29 RX ORDER — OMEPRAZOLE 20 MG/1
20 CAPSULE, DELAYED RELEASE ORAL DAILY
Qty: 30 CAPSULE | Refills: 1 | Status: SHIPPED | OUTPATIENT
Start: 2017-11-29 | End: 2018-02-21

## 2017-11-29 NOTE — PROGRESS NOTES
No results found for: ABORH, LABANTI, ABID    Chief Complaint   Patient presents with   • Routine Prenatal Visit     Pt complains of heart burn, other than that she is doing well.       Today Isidra complains of heartburn  See ob flowsheet for physical exam.    Prenatal Assessment  Fetal Heart Rate: 140  Movement: Present  Prenatal Vitals  BP: 114/70  Weight: 219 lb (99.3 kg)  Urine Glucose/Protein  Urine Glucose: Negative  Urine Protein: Negative     Tests done today: none  At the time of the next visit, she will need to have none    Impression:   Encounter Diagnoses   Name Primary?   • Encounter for supervision of normal first pregnancy in third trimester Yes       Plan: Return 2 weeks, Prilosec for heartburn otherwise Baby is moving well, no problems, 3 hr GTT was normal    This note was electronically signed.    Wilber Gomez MD

## 2017-12-12 ENCOUNTER — ROUTINE PRENATAL (OUTPATIENT)
Dept: OBSTETRICS AND GYNECOLOGY | Facility: CLINIC | Age: 22
End: 2017-12-12

## 2017-12-12 VITALS — DIASTOLIC BLOOD PRESSURE: 68 MMHG | SYSTOLIC BLOOD PRESSURE: 100 MMHG | WEIGHT: 224 LBS | BODY MASS INDEX: 36.15 KG/M2

## 2017-12-12 DIAGNOSIS — Z34.03 ENCOUNTER FOR SUPERVISION OF NORMAL FIRST PREGNANCY IN THIRD TRIMESTER: Primary | ICD-10-CM

## 2017-12-12 DIAGNOSIS — Z36.89 EVALUATE FETAL POSITION USING ULTRASOUND: ICD-10-CM

## 2017-12-12 PROCEDURE — 99212 OFFICE O/P EST SF 10 MIN: CPT | Performed by: OBSTETRICS & GYNECOLOGY

## 2017-12-12 NOTE — PROGRESS NOTES
No results found for: ABORH, LABANTI, ABID    Chief Complaint   Patient presents with   • Routine Prenatal Visit     no problems       Today Isidra has no specific complaints  See ob flowsheet for physical exam.    Prenatal Assessment  Fetal Heart Rate: 148  Movement: Present  Prenatal Vitals  BP: 100/68  Weight: 102 kg (224 lb)  Urine Glucose/Protein  Urine Glucose: Negative  Urine Protein: Negative     Tests done today: none  At the time of the next visit, she will need to have none    Impression:   Encounter Diagnoses   Name Primary?   • Encounter for supervision of normal first pregnancy in third trimester Yes       Plan: Return 2 weeks, the fetus seemed to be in a breech presentation on clinical exam but ultrasound has confirmed vertex presentation.  The BRIDGER is 9.3.    This note was electronically signed.    Wilber Gomez MD

## 2017-12-18 ENCOUNTER — HOSPITAL ENCOUNTER (OUTPATIENT)
Facility: HOSPITAL | Age: 22
Setting detail: OBSERVATION
Discharge: HOME | End: 2017-12-19
Attending: OBSTETRICS & GYNECOLOGY | Admitting: OBSTETRICS & GYNECOLOGY

## 2017-12-18 ENCOUNTER — TELEPHONE (OUTPATIENT)
Dept: OBSTETRICS AND GYNECOLOGY | Facility: CLINIC | Age: 22
End: 2017-12-18

## 2017-12-18 DIAGNOSIS — Z3A.33 33 WEEKS GESTATION OF PREGNANCY: Primary | ICD-10-CM

## 2017-12-18 LAB

## 2017-12-18 PROCEDURE — 25010000002 BETAMETHASONE ACET & SOD PHOS PER 4 MG: Performed by: OBSTETRICS & GYNECOLOGY

## 2017-12-18 PROCEDURE — G0378 HOSPITAL OBSERVATION PER HR: HCPCS

## 2017-12-18 PROCEDURE — 96372 THER/PROPH/DIAG INJ SC/IM: CPT

## 2017-12-18 PROCEDURE — 81001 URINALYSIS AUTO W/SCOPE: CPT | Performed by: OBSTETRICS & GYNECOLOGY

## 2017-12-18 PROCEDURE — 59025 FETAL NON-STRESS TEST: CPT | Performed by: OBSTETRICS & GYNECOLOGY

## 2017-12-18 PROCEDURE — 99218 PR INITIAL OBSERVATION CARE/DAY 30 MINUTES: CPT | Performed by: OBSTETRICS & GYNECOLOGY

## 2017-12-18 PROCEDURE — 59025 FETAL NON-STRESS TEST: CPT

## 2017-12-18 RX ORDER — NIFEDIPINE 10 MG/1
10 CAPSULE ORAL EVERY 6 HOURS SCHEDULED
Status: DISCONTINUED | OUTPATIENT
Start: 2017-12-18 | End: 2017-12-19 | Stop reason: HOSPADM

## 2017-12-18 RX ORDER — BETAMETHASONE SODIUM PHOSPHATE AND BETAMETHASONE ACETATE 3; 3 MG/ML; MG/ML
12 INJECTION, SUSPENSION INTRA-ARTICULAR; INTRALESIONAL; INTRAMUSCULAR; SOFT TISSUE EVERY 24 HOURS
Status: COMPLETED | OUTPATIENT
Start: 2017-12-18 | End: 2017-12-19

## 2017-12-18 RX ADMIN — NIFEDIPINE 10 MG: 10 CAPSULE, LIQUID FILLED ORAL at 21:53

## 2017-12-18 RX ADMIN — NIFEDIPINE 10 MG: 10 CAPSULE, LIQUID FILLED ORAL at 16:27

## 2017-12-18 RX ADMIN — BETAMETHASONE SODIUM PHOSPHATE AND BETAMETHASONE ACETATE 12 MG: 3; 3 INJECTION, SUSPENSION INTRA-ARTICULAR; INTRALESIONAL; INTRAMUSCULAR at 16:28

## 2017-12-18 NOTE — TELEPHONE ENCOUNTER
Pt called c/o's contractions.  After discussion with Dr Middleton pt was advised to go to  for evaluation.

## 2017-12-18 NOTE — H&P
[facility]  Obstetric History and Physical    Chief Complaint   Patient presents with   • Abdominal Pain       Subjective     Patient is a 22 y.o. female  currently at 33w2d, who presents with C/O contractions.. 's mild uterine contractions that started last evening irregular and then went away today notice more regular contractions without associated leaking of fluid or vaginal bleeding.  Patient also reports normal fetal activity.  Patient denies any other associated symptoms or precipitating factors.  Prenatal course with Dr. Steinberg without complications    Her prenatal care is complicated by  None, .  Her previous obstetric/gynecological history is noted for is remarkable for .    The following portions of the patients history were reviewed and updated as appropriate: current medications, allergies, past medical history, past surgical history, past family history, past social history and problem list .       Prenatal Information:   Maternal Prenatal Labs  Blood Type No results found for: ABO   Rh Status No results found for: RH   Antibody Screen No results found for: ABSCRN   Gonnorhea No results found for: GCCX   Chlamydia No results found for: CLAMYDCU   RPR No results found for: RPR   Syphilis Antibody No results found for: SYPHILIS   Rubella No results found for: RUBELLAIGGIN   Hepatitis B Surface Antigen No results found for: HEPBSAG   HIV-1 Antibody No results found for: LABHIV1   Hepatitis C Antibody No results found for: HEPCAB   Rapid Urin Drug Screen No results found for: AMPMETHU, BARBITSCNUR, LABBENZSCN, LABMETHSCN, LABOPIASCN, THCURSCR, COCAINEUR, AMPHETSCREEN, PROPOXSCN, BUPRENORSCNU, METAMPSCNUR, OXYCODONESCN, TRICYCLICSCN   Group B Strep Culture No results found for: GBSANTIGEN                 Past OB History:       Obstetric History       T0      L0     SAB0   TAB0   Ectopic0   Multiple0   Live Births0       # Outcome Date GA Lbr Car/2nd Weight Sex Delivery Anes PTL Lv   1  Current                   Past Medical History: Past Medical History:   Diagnosis Date   • Anxiety    • Asthma    • Depression    • Migraine       Past Surgical History Past Surgical History:   Procedure Laterality Date   • CHOLECYSTECTOMY  03/08/2016      Family History: Family History   Problem Relation Age of Onset   • Coronary artery disease Father    • Hypertension Father    • Seizures Father    • Diabetes Father    • Diabetes Paternal Grandfather    • Breast cancer Paternal Grandmother    • Colon cancer Maternal Grandmother    • Ovarian cancer Maternal Aunt    • Multiple sclerosis Sister    • Migraines Sister    • Seizures Brother    • Coronary artery disease Maternal Uncle    • Seizures Maternal Uncle    • Diabetes Maternal Uncle    • Mental illness Maternal Uncle    • Coronary artery disease Paternal Aunt    • Diabetes Paternal Aunt    • Mental retardation Paternal Uncle    • Coronary artery disease Maternal Grandfather    • Stroke Maternal Grandfather    • Diabetes Maternal Grandfather       Social History:  reports that she has never smoked. She has never used smokeless tobacco.   reports that she does not drink alcohol.   reports that she does not use illicit drugs.                   General ROS Negative Findings:Headaches, Visual Changes, Epigastric pain, Anorexia, Nausia/Vomiting, ROM and Vaginal Bleeding    ROS      Objective       Vital Signs Range for the last 24 hours  Temperature: Temp:  [99 °F (37.2 °C)] 99 °F (37.2 °C)   Temp Source: Temp src: Oral   BP: BP: (125)/(79) 125/79   Pulse: Heart Rate:  [96] 96   Respirations: Resp:  [16] 16   SPO2:     O2 Amount (l/min):     O2 Devices     Weight: Weight:  [102 kg (224 lb)] 102 kg (224 lb)     Physical Examination:   General:   alert, appears stated age and cooperative   Skin:   normal   HEENT:     Lungs:   clear to auscultation bilaterally   Heart:   regular rate and rhythm, S1, S2 normal, no murmur, click, rub or gallop   Abdomen:  soft, gravid  uterus non tender, neg CVA tenderness.   Lower Extremeties Tr edema, no calf tenderness, DTR2+/4   Pelvis:  External genitalia: normal general appearance  Uterus: enlarged         Presentation: vtx   Cervix: Exam by: Method: sterile exam per physician   Dilation: Dilation: 0.5   Effacement: Cervical Effacement: 70%   Station: Station: -3       Fetal Heart Rate Assessment   Method: Fetal HR Assessment Method: external   Beats/min: Fetal HR (Beats/Min): 148   Baseline: Fetal HR Baseline: normal range (110-160 bpm)   Varibility: Fetal HR Variability: moderate (amplitude range 6 to 25 bpm)   Accels:     Decels:     Tracing Category:     NST  ind    CTX,  Differential, moderate variability, accelerations present 15 x 15, onset 1451 offset a 36, irregular contractions.  Uterine Assessment   Method: Method: TOCO (external toco transducer)   Frequency (min):     Ctx Count in 10 min:     Duration:     Intensity:     Intensity by IUPC:     Resting Tone: Uterine Resting Tone: soft by palpation   Resting Tone by IUPC:     [place] Units:       Laboratory Results: @[email or web address]@  Radiology Review:@lastrad@  Other Studies:    Assessment/Plan     Active Problems:    Pregnancy        Assessment:  1.  Intrauterine pregnancy at 33w2d weeks gestation with reactive fetal status.    2.   contractions  R/O labor  3.   4.      Plan:  1. OBS, po hydration, UA  2. Plan of care has been reviewed with patient.  3.  Risks, benefits of treatment plan have been discussed.  4.  All questions have been answered.  5      Amos Thibodeaux DO  2017  3:29 PM

## 2017-12-18 NOTE — PROGRESS NOTES
Laborist    Patient continues to to contract after oral hydration.  Urinalysis without evidence of infection.  NST reactive    Plan continue observation, start Procardia 10 mg every 6 hours, and a course of betamethasone.  D/W Dr Gonzalez

## 2017-12-19 VITALS
BODY MASS INDEX: 36 KG/M2 | RESPIRATION RATE: 16 BRPM | WEIGHT: 224 LBS | DIASTOLIC BLOOD PRESSURE: 70 MMHG | TEMPERATURE: 98.2 F | HEIGHT: 66 IN | HEART RATE: 100 BPM | SYSTOLIC BLOOD PRESSURE: 122 MMHG

## 2017-12-19 PROBLEM — O47.03 PRETERM UTERINE CONTRACTIONS IN THIRD TRIMESTER, ANTEPARTUM: Status: ACTIVE | Noted: 2017-12-19

## 2017-12-19 PROBLEM — Z3A.33 PREGNANCY WITH 33 COMPLETED WEEKS GESTATION: Status: ACTIVE | Noted: 2017-12-19

## 2017-12-19 PROCEDURE — 59025 FETAL NON-STRESS TEST: CPT

## 2017-12-19 PROCEDURE — G0378 HOSPITAL OBSERVATION PER HR: HCPCS

## 2017-12-19 PROCEDURE — 96372 THER/PROPH/DIAG INJ SC/IM: CPT

## 2017-12-19 PROCEDURE — 99217 PR OBSERVATION CARE DISCHARGE MANAGEMENT: CPT | Performed by: OBSTETRICS & GYNECOLOGY

## 2017-12-19 PROCEDURE — 25010000002 BETAMETHASONE ACET & SOD PHOS PER 4 MG: Performed by: OBSTETRICS & GYNECOLOGY

## 2017-12-19 RX ORDER — NIFEDIPINE 10 MG/1
10 CAPSULE ORAL EVERY 6 HOURS SCHEDULED
Qty: 50 CAPSULE | Refills: 4 | Status: SHIPPED | OUTPATIENT
Start: 2017-12-19 | End: 2018-01-12 | Stop reason: HOSPADM

## 2017-12-19 RX ADMIN — NIFEDIPINE 10 MG: 10 CAPSULE, LIQUID FILLED ORAL at 15:37

## 2017-12-19 RX ADMIN — NIFEDIPINE 10 MG: 10 CAPSULE, LIQUID FILLED ORAL at 09:54

## 2017-12-19 RX ADMIN — NIFEDIPINE 10 MG: 10 CAPSULE, LIQUID FILLED ORAL at 04:10

## 2017-12-19 RX ADMIN — BETAMETHASONE SODIUM PHOSPHATE AND BETAMETHASONE ACETATE 12 MG: 3; 3 INJECTION, SUSPENSION INTRA-ARTICULAR; INTRALESIONAL; INTRAMUSCULAR at 16:28

## 2017-12-19 NOTE — DISCHARGE SUMMARY
Admission date: 2017  Discharge date: 17    Admission diagnosis:  Pregnancy [Z34.90]   contractions  Discharge diagnosis:  Same    Consultants:   none    Hospital course:  Patient was admitted to the hospital and hydrated with by mouth fluids.  She continued to have contractions every 3-5 minutes and was started on Procardia 10 mg every 4 hours.  She was given betamethasone 12 mg and repeated in 24 hour.  The patient's contractions subsided and the patient was ready for discharge after her second betamethasone injection.  She will continue Procardia 10 mg every 4 hours at home and return to the office for her routine scheduled visit on 2017.    Before being discharge the patient complained of increased vaginal drainage and spotting dark red blood.  A sterile speculum was done and resealed a small amount of nitrazine-negative mucus and a small amount of brownish blood.  The cervix was fingertip, 60%, and -2 station.  An ultrasound was done and the BRIDGER was 9.48 cm. This is approximately the same BRIDGER is last week.    Vitals:    17 1258   BP: 121/69   Pulse: 96   Resp:    Temp:      GENERAL:  Well-developed, well-nourished in no acute distress.   SKIN:  Warm, dry without rashes, purpura or petechiae.  NECK:  Supple with good range of motion; no thyromegaly or masses, no JVD.  LYMPHATICS:  No cervical, supraclavicular, axillary or inguinal adenopathy.  CHEST:  Lungs clear to percussion and auscultation. Good airflow.  CARDIAC:  Regular rate and rhythm without murmurs, rubs or gallops.   EXTREMITIES:  No clubbing, cyanosis or edema.  PSYCHIATRIC:  Normal affect and mood.      Discharge condition: stable  Discharge diet        Dietary Orders            Start     Ordered    17 1607  Diet Regular  Diet Effective Now     Question:  Diet Texture / Consistency  Answer:  Regular    17 1606        Additional Instructions: Call with fevers, uncontrolled nausea/vomiting/pain.  Medications:     Isidra CURRY   Home Medication Instructions BLANK:[id number]    Printed on:12/19/17 8775   Medication Information                      albuterol (PROVENTIL) (2.5 MG/3ML) 0.083% nebulizer solution  Take 2.5 mg by nebulization Every 4 (Four) Hours As Needed for Wheezing.             cetirizine-pseudoephedrine (ZyrTEC-D) 5-120 MG per 12 hr tablet  Take 1 tablet by mouth 2 (Two) Times a Day.             magnesium oxide (MAG-OX) 400 MG tablet  Take 1 tablet by mouth Every Night.             NIFEdipine (PROCARDIA) 10 MG capsule  Take 1 capsule by mouth Every 6 (Six) Hours.             omeprazole (PRILOSEC) 20 MG capsule  Take 1 capsule by mouth Daily.             Prenatal Vit-Fe Fumarate-FA (PRENATAL VITAMIN 27-0.8) 27-0.8 MG tablet tablet  Take 1 tablet by mouth Daily.               Disposition:Left Without Being Seen  Follow up:   Future Appointments  Date Time Provider Department Center   12/27/2017 3:20 PM MD [facility] None   3/12/2018 11:00 AM [facility] None       Less than 30 minutes on discharge.  Counseling and coordinating care.    Wilber Gomez MD

## 2017-12-19 NOTE — PLAN OF CARE
Problem: Patient Care Overview (Adult)  Goal: Plan of Care Review  Outcome: Ongoing (interventions implemented as appropriate)    12/19/17 0818   Coping/Psychosocial Response Interventions   Plan Of Care Reviewed With patient;mother   Patient Care Overview   Progress progress toward functional goals as expected       Goal: Adult Individualization and Mutuality  Outcome: Ongoing (interventions implemented as appropriate)    12/19/17 0818   Individualization   Patient Specific Preferences monitor after breakfast   Patient Specific Goals be able to go home today   Patient Specific Interventions procardia, steroids, TID monitoring       Goal: Discharge Needs Assessment  Outcome: Ongoing (interventions implemented as appropriate)    12/19/17 0818   Discharge Needs Assessment   Concerns To Be Addressed denies needs/concerns at this time

## 2017-12-19 NOTE — NURSING NOTE
D/c instructions given to pt. Pt states understanding of instructions & denies further questions/concerns. Pt educated procardia prescription sent to pharmacy of choice per Dr. Gomez. Pt states will keep next scheduled appt with primary OB provider. Doctor's office/exchange/labor [place] phone numbers provided to pt in case questions/concerns arise at home. Pt denies need for wheelchair & is walking independently off unit to private vehicle.

## 2017-12-19 NOTE — PROGRESS NOTES
"[facility]  Obstetric Progress Note    Subjective     Patient:    The patient feels better.  She is reporting no contractions this morning.  She is having no side effects from the Procardia 10 mg every 4 hours.     Objective     Vital Signs Range for the last 24 hours  Temp:  [97.9 °F (36.6 °C)-99 °F (37.2 °C)] 97.9 °F (36.6 °C)   Temp src: Oral   BP: (110-131)/(55-79) 110/55   Heart Rate:  [] 93   Resp:  [16] 16               Weight:  [102 kg (224 lb)] 102 kg (224 lb)       Flowsheet Rows         First Filed Value    Admission Height  167.6 cm (66\") Documented at 12/18/2017 1453    Admission Weight  102 kg (224 lb) Documented at 12/18/2017 1453          Intake/Output last 24 hours:    No intake or output data in the 24 hours ending 12/19/17 0749    Intake/Output this shift:         Physical Exam:  General: Patient is comfortable and in no acute distress   Heart CVS exam: not examined.   Lungs Chest: not examined.     Abdomen Abdominal exam: not examined.   Extremities Exam of extremities: not examined     Presentation: Vertex   Cervix: Exam by: Method: sterile exam per physician   Dilation: Dilation: 1   Effacement: Cervical Effacement: 70%   Station: Station: -3         Fetal Heart Rate Assessment   Method: Fetal HR Assessment Method: external (ptmay come off continuous monitor verbal order- Dr. Marie)   Beats/min: Fetal HR (Beats/Min): 120   Baseline: Fetal HR Baseline: normal range (110-160 bpm)   Varibility: Fetal HR Variability: moderate (amplitude range 6 to 25 bpm)   Accels: Fetal HR Accelerations: greater than/equal to 15 bpm, lasting at least 15 seconds   Decels: Fetal HR Decelerations: absent   Tracing Category:       Uterine Assessment   Method: Method: TOCO (external toco transducer)   Frequency (min): Contraction Frequency (min): 2-8   Ctx Count in 10 min:     Duration: Contraction Duration (sec): 50-90   Intensity: Contraction Intensity: mild by palpation   Intensity by IUPC:     Resting " Tone: Uterine Resting Tone: soft by palpation, relaxation >60 sec   Resting Tone by IUPC:     [place] Units:         Assessment/Plan     Active Problems:    Pregnancy        Assessment:  1.  Intrauterine pregnancy at 33w3d weeks gestation with reactive fetal status.    2.   labor  without ROM  3.  Obstetrical history significant for is non-contributory.  4.  GBS status: No results found for: GBSANTIGEN    Plan:  1. fetal and uterine monitoring  intermittent, tocolysis with nifedipine, IV steroids for fetal benefit and expectant management  2. Plan of care has been reviewed with patient and patient agrees  3.  Risks, benefits of treatment plan have been discussed.  4.  All questions have been answered.  5.  Discharge this afternoon on Procardia 10 mg after the second betamethasone shot.  6.  Repeat routine appointment next week on 2017.      Wilber Gomez MD  2017  7:49 AM

## 2017-12-27 ENCOUNTER — ROUTINE PRENATAL (OUTPATIENT)
Dept: OBSTETRICS AND GYNECOLOGY | Facility: CLINIC | Age: 22
End: 2017-12-27

## 2017-12-27 VITALS — SYSTOLIC BLOOD PRESSURE: 120 MMHG | DIASTOLIC BLOOD PRESSURE: 70 MMHG | WEIGHT: 226 LBS | BODY MASS INDEX: 36.48 KG/M2

## 2017-12-27 DIAGNOSIS — Z34.03 ENCOUNTER FOR SUPERVISION OF NORMAL FIRST PREGNANCY IN THIRD TRIMESTER: Primary | ICD-10-CM

## 2017-12-27 PROCEDURE — 99212 OFFICE O/P EST SF 10 MIN: CPT | Performed by: OBSTETRICS & GYNECOLOGY

## 2017-12-27 NOTE — PROGRESS NOTES
No results found for: ABORH, LABANTI, ABID    Chief Complaint   Patient presents with   • Routine Prenatal Visit     no problems       Today Isidra has no specific complaints  See ob flowsheet for physical exam.    Prenatal Assessment  Fetal Heart Rate: 155  Movement: Present  Prenatal Vitals  BP: 120/70  Weight: 103 kg (226 lb)  Urine Glucose/Protein  Urine Glucose: Negative  Urine Protein: Trace     Tests done today: none  At the time of the next visit, she will need to have none    Impression:   Encounter Diagnoses   Name Primary?   • Encounter for supervision of normal first pregnancy in third trimester Yes       Plan: Return 1 week, Baby is moving well, no problems    This note was electronically signed.    Wilber Gomez MD

## 2018-01-03 ENCOUNTER — ROUTINE PRENATAL (OUTPATIENT)
Dept: OBSTETRICS AND GYNECOLOGY | Facility: CLINIC | Age: 23
End: 2018-01-03

## 2018-01-03 VITALS — SYSTOLIC BLOOD PRESSURE: 120 MMHG | BODY MASS INDEX: 36.64 KG/M2 | WEIGHT: 227 LBS | DIASTOLIC BLOOD PRESSURE: 72 MMHG

## 2018-01-03 DIAGNOSIS — Z34.03 ENCOUNTER FOR SUPERVISION OF NORMAL FIRST PREGNANCY IN THIRD TRIMESTER: Primary | ICD-10-CM

## 2018-01-03 PROCEDURE — 99212 OFFICE O/P EST SF 10 MIN: CPT | Performed by: OBSTETRICS & GYNECOLOGY

## 2018-01-03 NOTE — PROGRESS NOTES
No results found for: ABORH, LABANTI, ABID    Chief Complaint   Patient presents with   • Routine Prenatal Visit     no problems       Today Isidra has no specific complaints  See ob flowsheet for physical exam.    Prenatal Assessment  Fetal Heart Rate: 144  Movement: Present  Prenatal Vitals  BP: 120/72  Weight: 103 kg (227 lb)  Urine Glucose/Protein  Urine Glucose: Negative  Urine Protein: Trace     Tests done today: none  At the time of the next visit, she will need to have GBS testing    Impression:   Encounter Diagnoses   Name Primary?   • Encounter for supervision of normal first pregnancy in third trimester Yes       Plan: Return 1 week, Baby is moving well, no problems    This note was electronically signed.    Wilber Gomez MD

## 2018-01-09 ENCOUNTER — HOSPITAL ENCOUNTER (INPATIENT)
Facility: HOSPITAL | Age: 23
LOS: 3 days | Discharge: HOME | End: 2018-01-12
Attending: OBSTETRICS & GYNECOLOGY | Admitting: OBSTETRICS & GYNECOLOGY

## 2018-01-09 DIAGNOSIS — O47.03 PRETERM UTERINE CONTRACTIONS IN THIRD TRIMESTER, ANTEPARTUM: Primary | ICD-10-CM

## 2018-01-09 DIAGNOSIS — Z3A.36 PREGNANCY WITH 36 COMPLETED WEEKS GESTATION: ICD-10-CM

## 2018-01-09 PROBLEM — Z3A.33 PREGNANCY WITH 33 COMPLETED WEEKS GESTATION: Status: RESOLVED | Noted: 2017-12-19 | Resolved: 2018-01-09

## 2018-01-09 LAB
ABO GROUP BLD: NORMAL
BLD GP AB SCN SERPL QL: NEGATIVE
DEPRECATED RDW RBC AUTO: 40.9 FL (ref 37–54)
ERYTHROCYTE [DISTWIDTH] IN BLOOD BY AUTOMATED COUNT: 12.6 % (ref 11.3–14.5)
EXTERNAL HEMATOCRIT: 33 %
EXTERNAL HEMOGLOBIN: 11 G/DL
HCT VFR BLD AUTO: 33.1 % (ref 34.5–44)
HGB BLD-MCNC: 11 G/DL (ref 11.5–15.5)
MCH RBC QN AUTO: 29.7 PG (ref 27–31)
MCHC RBC AUTO-ENTMCNC: 33.2 G/DL (ref 32–36)
MCV RBC AUTO: 89.5 FL (ref 80–99)
PLATELET # BLD AUTO: 206 10*3/MM3 (ref 150–450)
PMV BLD AUTO: 10.5 FL (ref 6–12)
RBC # BLD AUTO: 3.7 10*6/MM3 (ref 3.89–5.14)
RH BLD: POSITIVE
WBC NRBC COR # BLD: 11.45 10*3/MM3 (ref 4.5–13.5)

## 2018-01-09 PROCEDURE — 86901 BLOOD TYPING SEROLOGIC RH(D): CPT | Performed by: OBSTETRICS & GYNECOLOGY

## 2018-01-09 PROCEDURE — 25010000002 TERBUTALINE PER 1 MG: Performed by: OBSTETRICS & GYNECOLOGY

## 2018-01-09 PROCEDURE — 85027 COMPLETE CBC AUTOMATED: CPT | Performed by: OBSTETRICS & GYNECOLOGY

## 2018-01-09 PROCEDURE — S0260 H&P FOR SURGERY: HCPCS | Performed by: OBSTETRICS & GYNECOLOGY

## 2018-01-09 PROCEDURE — 86850 RBC ANTIBODY SCREEN: CPT | Performed by: OBSTETRICS & GYNECOLOGY

## 2018-01-09 PROCEDURE — 86900 BLOOD TYPING SEROLOGIC ABO: CPT | Performed by: OBSTETRICS & GYNECOLOGY

## 2018-01-09 PROCEDURE — G0378 HOSPITAL OBSERVATION PER HR: HCPCS

## 2018-01-09 RX ORDER — TERBUTALINE SULFATE 1 MG/ML
0.25 INJECTION, SOLUTION SUBCUTANEOUS ONCE
Status: COMPLETED | OUTPATIENT
Start: 2018-01-09 | End: 2018-01-09

## 2018-01-09 RX ORDER — SODIUM CHLORIDE, SODIUM LACTATE, POTASSIUM CHLORIDE, CALCIUM CHLORIDE 600; 310; 30; 20 MG/100ML; MG/100ML; MG/100ML; MG/100ML
999 INJECTION, SOLUTION INTRAVENOUS ONCE
Status: COMPLETED | OUTPATIENT
Start: 2018-01-09 | End: 2018-01-09

## 2018-01-09 RX ORDER — SODIUM CHLORIDE, SODIUM LACTATE, POTASSIUM CHLORIDE, CALCIUM CHLORIDE 600; 310; 30; 20 MG/100ML; MG/100ML; MG/100ML; MG/100ML
125 INJECTION, SOLUTION INTRAVENOUS CONTINUOUS
Status: DISCONTINUED | OUTPATIENT
Start: 2018-01-09 | End: 2018-01-10

## 2018-01-09 RX ORDER — SODIUM CHLORIDE, SODIUM LACTATE, POTASSIUM CHLORIDE, CALCIUM CHLORIDE 600; 310; 30; 20 MG/100ML; MG/100ML; MG/100ML; MG/100ML
125 INJECTION, SOLUTION INTRAVENOUS CONTINUOUS
Status: DISCONTINUED | OUTPATIENT
Start: 2018-01-09 | End: 2018-01-09 | Stop reason: SDUPTHER

## 2018-01-09 RX ORDER — ONDANSETRON 4 MG/1
4 TABLET, FILM COATED ORAL EVERY 8 HOURS PRN
Status: DISCONTINUED | OUTPATIENT
Start: 2018-01-09 | End: 2018-01-10

## 2018-01-09 RX ORDER — ZOLPIDEM TARTRATE 5 MG/1
10 TABLET ORAL NIGHTLY PRN
Status: DISCONTINUED | OUTPATIENT
Start: 2018-01-09 | End: 2018-01-10

## 2018-01-09 RX ORDER — ONDANSETRON 2 MG/ML
4 INJECTION INTRAMUSCULAR; INTRAVENOUS EVERY 8 HOURS PRN
Status: DISCONTINUED | OUTPATIENT
Start: 2018-01-09 | End: 2018-01-10

## 2018-01-09 RX ORDER — ACETAMINOPHEN 325 MG/1
650 TABLET ORAL EVERY 4 HOURS PRN
Status: DISCONTINUED | OUTPATIENT
Start: 2018-01-09 | End: 2018-01-10

## 2018-01-09 RX ADMIN — SODIUM CHLORIDE, POTASSIUM CHLORIDE, SODIUM LACTATE AND CALCIUM CHLORIDE 125 ML/HR: 600; 310; 30; 20 INJECTION, SOLUTION INTRAVENOUS at 18:18

## 2018-01-09 RX ADMIN — SODIUM CHLORIDE, POTASSIUM CHLORIDE, SODIUM LACTATE AND CALCIUM CHLORIDE 999 ML/HR: 600; 310; 30; 20 INJECTION, SOLUTION INTRAVENOUS at 17:53

## 2018-01-09 RX ADMIN — TERBUTALINE SULFATE 0.25 MG: 1 INJECTION SUBCUTANEOUS at 17:53

## 2018-01-10 ENCOUNTER — ANESTHESIA (OUTPATIENT)
Dept: LABOR AND DELIVERY | Facility: HOSPITAL | Age: 23
End: 2018-01-10

## 2018-01-10 ENCOUNTER — ANESTHESIA EVENT (OUTPATIENT)
Dept: LABOR AND DELIVERY | Facility: HOSPITAL | Age: 23
End: 2018-01-10

## 2018-01-10 PROBLEM — O47.03 PRETERM UTERINE CONTRACTIONS IN THIRD TRIMESTER, ANTEPARTUM: Status: RESOLVED | Noted: 2017-12-19 | Resolved: 2018-01-10

## 2018-01-10 PROBLEM — Z3A.36 PREGNANCY WITH 36 COMPLETED WEEKS GESTATION: Status: RESOLVED | Noted: 2018-01-09 | Resolved: 2018-01-10

## 2018-01-10 PROBLEM — Z33.1 PREGNANT STATE, INCIDENTAL: Status: ACTIVE | Noted: 2018-01-10

## 2018-01-10 PROBLEM — O47.03 PRETERM UTERINE CONTRACTIONS, ANTEPARTUM, THIRD TRIMESTER: Status: RESOLVED | Noted: 2018-01-09 | Resolved: 2018-01-10

## 2018-01-10 PROBLEM — Z33.1 PREGNANT STATE, INCIDENTAL: Status: RESOLVED | Noted: 2018-01-10 | Resolved: 2018-01-10

## 2018-01-10 PROCEDURE — 25010000002 BUTORPHANOL PER 1 MG: Performed by: OBSTETRICS & GYNECOLOGY

## 2018-01-10 PROCEDURE — 10907ZC DRAINAGE OF AMNIOTIC FLUID, THERAPEUTIC FROM PRODUCTS OF CONCEPTION, VIA NATURAL OR ARTIFICIAL OPENING: ICD-10-PCS | Performed by: OBSTETRICS & GYNECOLOGY

## 2018-01-10 PROCEDURE — 25010000002 ROPIVACAINE PER 1 MG: Performed by: ANESTHESIOLOGY

## 2018-01-10 PROCEDURE — 59410 OBSTETRICAL CARE: CPT | Performed by: OBSTETRICS & GYNECOLOGY

## 2018-01-10 PROCEDURE — 25010000002 PENICILLIN G POTASSIUM PER 600000 UNITS: Performed by: OBSTETRICS & GYNECOLOGY

## 2018-01-10 PROCEDURE — 25010000002 FENTANYL CITRATE (PF) 100 MCG/2ML SOLUTION: Performed by: ANESTHESIOLOGY

## 2018-01-10 PROCEDURE — C1755 CATHETER, INTRASPINAL: HCPCS

## 2018-01-10 PROCEDURE — C1755 CATHETER, INTRASPINAL: HCPCS | Performed by: ANESTHESIOLOGY

## 2018-01-10 PROCEDURE — 0KQM0ZZ REPAIR PERINEUM MUSCLE, OPEN APPROACH: ICD-10-PCS | Performed by: OBSTETRICS & GYNECOLOGY

## 2018-01-10 RX ORDER — EPHEDRINE SULFATE/0.9% NACL/PF 50 MG/10ML
10 SYRINGE (ML) INTRAVENOUS
Status: DISCONTINUED | OUTPATIENT
Start: 2018-01-10 | End: 2018-01-10 | Stop reason: HOSPADM

## 2018-01-10 RX ORDER — METHYLERGONOVINE MALEATE 0.2 MG/ML
200 INJECTION INTRAVENOUS ONCE AS NEEDED
Status: DISCONTINUED | OUTPATIENT
Start: 2018-01-10 | End: 2018-01-10 | Stop reason: HOSPADM

## 2018-01-10 RX ORDER — DOCUSATE SODIUM 100 MG/1
100 CAPSULE, LIQUID FILLED ORAL 2 TIMES DAILY PRN
Status: DISCONTINUED | OUTPATIENT
Start: 2018-01-10 | End: 2018-01-12 | Stop reason: HOSPADM

## 2018-01-10 RX ORDER — LIDOCAINE HYDROCHLORIDE 10 MG/ML
5 INJECTION, SOLUTION INFILTRATION; PERINEURAL AS NEEDED
Status: DISCONTINUED | OUTPATIENT
Start: 2018-01-10 | End: 2018-01-10 | Stop reason: HOSPADM

## 2018-01-10 RX ORDER — ONDANSETRON 4 MG/1
4 TABLET, FILM COATED ORAL EVERY 6 HOURS PRN
Status: DISCONTINUED | OUTPATIENT
Start: 2018-01-10 | End: 2018-01-10 | Stop reason: HOSPADM

## 2018-01-10 RX ORDER — LIDOCAINE HYDROCHLORIDE AND EPINEPHRINE 15; 5 MG/ML; UG/ML
INJECTION, SOLUTION EPIDURAL AS NEEDED
Status: DISCONTINUED | OUTPATIENT
Start: 2018-01-10 | End: 2018-01-10 | Stop reason: SURG

## 2018-01-10 RX ORDER — OXYTOCIN/RINGER'S LACTATE 20/1000 ML
999 PLASTIC BAG, INJECTION (ML) INTRAVENOUS ONCE
Status: COMPLETED | OUTPATIENT
Start: 2018-01-10 | End: 2018-01-10

## 2018-01-10 RX ORDER — METOCLOPRAMIDE HYDROCHLORIDE 5 MG/ML
10 INJECTION INTRAMUSCULAR; INTRAVENOUS ONCE AS NEEDED
Status: DISCONTINUED | OUTPATIENT
Start: 2018-01-10 | End: 2018-01-10 | Stop reason: HOSPADM

## 2018-01-10 RX ORDER — OXYTOCIN-SODIUM CHLORIDE 0.9% IV SOLN 30 UNIT/500ML 30-0.9/5 UT/ML-%
2-24 SOLUTION INTRAVENOUS
Status: DISCONTINUED | OUTPATIENT
Start: 2018-01-10 | End: 2018-01-10 | Stop reason: HOSPADM

## 2018-01-10 RX ORDER — BISACODYL 10 MG
10 SUPPOSITORY, RECTAL RECTAL DAILY PRN
Status: DISCONTINUED | OUTPATIENT
Start: 2018-01-11 | End: 2018-01-12 | Stop reason: HOSPADM

## 2018-01-10 RX ORDER — OXYTOCIN/RINGER'S LACTATE 20/1000 ML
125 PLASTIC BAG, INJECTION (ML) INTRAVENOUS CONTINUOUS PRN
Status: DISCONTINUED | OUTPATIENT
Start: 2018-01-10 | End: 2018-01-11

## 2018-01-10 RX ORDER — LANOLIN 100 %
OINTMENT (GRAM) TOPICAL AS NEEDED
Status: DISCONTINUED | OUTPATIENT
Start: 2018-01-10 | End: 2018-01-12 | Stop reason: HOSPADM

## 2018-01-10 RX ORDER — MISOPROSTOL 200 UG/1
800 TABLET ORAL AS NEEDED
Status: DISCONTINUED | OUTPATIENT
Start: 2018-01-10 | End: 2018-01-10 | Stop reason: HOSPADM

## 2018-01-10 RX ORDER — CARBOPROST TROMETHAMINE 250 UG/ML
250 INJECTION, SOLUTION INTRAMUSCULAR AS NEEDED
Status: DISCONTINUED | OUTPATIENT
Start: 2018-01-10 | End: 2018-01-10 | Stop reason: HOSPADM

## 2018-01-10 RX ORDER — TRISODIUM CITRATE DIHYDRATE AND CITRIC ACID MONOHYDRATE 500; 334 MG/5ML; MG/5ML
30 SOLUTION ORAL ONCE
Status: DISCONTINUED | OUTPATIENT
Start: 2018-01-10 | End: 2018-01-10 | Stop reason: HOSPADM

## 2018-01-10 RX ORDER — ONDANSETRON 2 MG/ML
4 INJECTION INTRAMUSCULAR; INTRAVENOUS ONCE AS NEEDED
Status: DISCONTINUED | OUTPATIENT
Start: 2018-01-10 | End: 2018-01-10 | Stop reason: HOSPADM

## 2018-01-10 RX ORDER — DIPHENHYDRAMINE HYDROCHLORIDE 50 MG/ML
12.5 INJECTION INTRAMUSCULAR; INTRAVENOUS EVERY 8 HOURS PRN
Status: DISCONTINUED | OUTPATIENT
Start: 2018-01-10 | End: 2018-01-10 | Stop reason: HOSPADM

## 2018-01-10 RX ORDER — SODIUM CHLORIDE, SODIUM LACTATE, POTASSIUM CHLORIDE, CALCIUM CHLORIDE 600; 310; 30; 20 MG/100ML; MG/100ML; MG/100ML; MG/100ML
125 INJECTION, SOLUTION INTRAVENOUS CONTINUOUS
Status: DISCONTINUED | OUTPATIENT
Start: 2018-01-10 | End: 2018-01-11

## 2018-01-10 RX ORDER — OXYCODONE HYDROCHLORIDE AND ACETAMINOPHEN 5; 325 MG/1; MG/1
1 TABLET ORAL EVERY 4 HOURS PRN
Status: DISCONTINUED | OUTPATIENT
Start: 2018-01-10 | End: 2018-01-12 | Stop reason: HOSPADM

## 2018-01-10 RX ORDER — FAMOTIDINE 10 MG/ML
20 INJECTION, SOLUTION INTRAVENOUS ONCE AS NEEDED
Status: DISCONTINUED | OUTPATIENT
Start: 2018-01-10 | End: 2018-01-10 | Stop reason: HOSPADM

## 2018-01-10 RX ORDER — IBUPROFEN 600 MG/1
600 TABLET ORAL EVERY 6 HOURS PRN
Status: DISCONTINUED | OUTPATIENT
Start: 2018-01-10 | End: 2018-01-12 | Stop reason: HOSPADM

## 2018-01-10 RX ORDER — MAGNESIUM CARB/ALUMINUM HYDROX 105-160MG
30 TABLET,CHEWABLE ORAL ONCE
Status: DISCONTINUED | OUTPATIENT
Start: 2018-01-10 | End: 2018-01-10 | Stop reason: HOSPADM

## 2018-01-10 RX ORDER — ROPIVACAINE HYDROCHLORIDE 2 MG/ML
15 INJECTION, SOLUTION EPIDURAL; INFILTRATION; PERINEURAL CONTINUOUS
Status: DISCONTINUED | OUTPATIENT
Start: 2018-01-10 | End: 2018-01-11

## 2018-01-10 RX ORDER — SODIUM CHLORIDE 0.9 % (FLUSH) 0.9 %
1-10 SYRINGE (ML) INJECTION AS NEEDED
Status: DISCONTINUED | OUTPATIENT
Start: 2018-01-10 | End: 2018-01-10 | Stop reason: HOSPADM

## 2018-01-10 RX ORDER — PROMETHAZINE HYDROCHLORIDE 25 MG/1
25 TABLET ORAL EVERY 6 HOURS PRN
Status: DISCONTINUED | OUTPATIENT
Start: 2018-01-10 | End: 2018-01-12 | Stop reason: HOSPADM

## 2018-01-10 RX ORDER — HYDROCODONE BITARTRATE AND ACETAMINOPHEN 5; 325 MG/1; MG/1
1 TABLET ORAL EVERY 4 HOURS PRN
Status: DISCONTINUED | OUTPATIENT
Start: 2018-01-10 | End: 2018-01-12 | Stop reason: HOSPADM

## 2018-01-10 RX ORDER — ZOLPIDEM TARTRATE 5 MG/1
5 TABLET ORAL NIGHTLY PRN
Status: DISCONTINUED | OUTPATIENT
Start: 2018-01-10 | End: 2018-01-12 | Stop reason: HOSPADM

## 2018-01-10 RX ORDER — ONDANSETRON 2 MG/ML
4 INJECTION INTRAMUSCULAR; INTRAVENOUS EVERY 6 HOURS PRN
Status: DISCONTINUED | OUTPATIENT
Start: 2018-01-10 | End: 2018-01-10 | Stop reason: HOSPADM

## 2018-01-10 RX ORDER — FENTANYL CITRATE 50 UG/ML
INJECTION, SOLUTION INTRAMUSCULAR; INTRAVENOUS AS NEEDED
Status: DISCONTINUED | OUTPATIENT
Start: 2018-01-10 | End: 2018-01-10 | Stop reason: SURG

## 2018-01-10 RX ADMIN — SODIUM CHLORIDE, POTASSIUM CHLORIDE, SODIUM LACTATE AND CALCIUM CHLORIDE 999 ML/HR: 600; 310; 30; 20 INJECTION, SOLUTION INTRAVENOUS at 00:07

## 2018-01-10 RX ADMIN — Medication 999 ML/HR: at 14:03

## 2018-01-10 RX ADMIN — ROPIVACAINE HYDROCHLORIDE 15 ML/HR: 2 INJECTION, SOLUTION EPIDURAL; INFILTRATION at 10:49

## 2018-01-10 RX ADMIN — Medication 10 MG: at 11:34

## 2018-01-10 RX ADMIN — LIDOCAINE HYDROCHLORIDE AND EPINEPHRINE 2 ML: 15; 5 INJECTION, SOLUTION EPIDURAL at 10:43

## 2018-01-10 RX ADMIN — OXYTOCIN 2 MILLI-UNITS/MIN: 10 INJECTION INTRAVENOUS at 11:01

## 2018-01-10 RX ADMIN — IBUPROFEN 600 MG: 600 TABLET, FILM COATED ORAL at 16:16

## 2018-01-10 RX ADMIN — SODIUM CHLORIDE, POTASSIUM CHLORIDE, SODIUM LACTATE AND CALCIUM CHLORIDE 125 ML/HR: 600; 310; 30; 20 INJECTION, SOLUTION INTRAVENOUS at 10:31

## 2018-01-10 RX ADMIN — BENZOCAINE AND MENTHOL: 20; .5 SPRAY TOPICAL at 16:16

## 2018-01-10 RX ADMIN — FENTANYL CITRATE 100 MCG: 50 INJECTION, SOLUTION INTRAMUSCULAR; INTRAVENOUS at 10:47

## 2018-01-10 RX ADMIN — OXYTOCIN 125 ML/HR: 10 INJECTION INTRAVENOUS at 15:33

## 2018-01-10 RX ADMIN — SODIUM CHLORIDE 15 MILLION UNITS: 9 INJECTION, SOLUTION INTRAVENOUS at 07:37

## 2018-01-10 RX ADMIN — ROPIVACAINE HYDROCHLORIDE 12 ML: 5 INJECTION, SOLUTION EPIDURAL; INFILTRATION; PERINEURAL at 10:45

## 2018-01-10 RX ADMIN — LIDOCAINE HYDROCHLORIDE AND EPINEPHRINE 3 ML: 15; 5 INJECTION, SOLUTION EPIDURAL at 10:41

## 2018-01-10 RX ADMIN — BENZOCAINE 1 APPLICATION: 5.6 OINTMENT TOPICAL at 16:16

## 2018-01-10 RX ADMIN — BUTORPHANOL TARTRATE 2 MG: 2 INJECTION, SOLUTION INTRAMUSCULAR; INTRAVENOUS at 01:49

## 2018-01-10 RX ADMIN — SODIUM CHLORIDE, POTASSIUM CHLORIDE, SODIUM LACTATE AND CALCIUM CHLORIDE 1000 ML: 600; 310; 30; 20 INJECTION, SOLUTION INTRAVENOUS at 10:03

## 2018-01-10 RX ADMIN — WITCH HAZEL 1 PAD: 500 SOLUTION RECTAL; TOPICAL at 16:16

## 2018-01-10 RX ADMIN — HYDROCODONE BITARTRATE AND ACETAMINOPHEN 1 TABLET: 5; 325 TABLET ORAL at 19:47

## 2018-01-10 RX ADMIN — SODIUM CHLORIDE, POTASSIUM CHLORIDE, SODIUM LACTATE AND CALCIUM CHLORIDE 125 ML/HR: 600; 310; 30; 20 INJECTION, SOLUTION INTRAVENOUS at 06:05

## 2018-01-10 NOTE — PROGRESS NOTES
[facility]  Obstetric Progress Note    Subjective     Patient:    The patient feels tired.      Objective     Vital Signs Range for the last 24 hours  Temp:  [98.3 °F (36.8 °C)-98.6 °F (37 °C)] 98.3 °F (36.8 °C)   Temp src: Oral   BP: (111-130)/(56-79) 111/56   Heart Rate:  [87-98] 94   Resp:  [16-18] 16                         Intake/Output last 24 hours:      Intake/Output Summary (Last 24 hours) at 01/10/18 0722  Last data filed at 01/10/18 0605   Gross per 24 hour   Intake             2000 ml   Output                0 ml   Net             2000 ml       Intake/Output this shift:         Physical Exam:  General: Patient is uncomfortable   Heart CVS exam: not examined.   Lungs Chest: not examined.     Abdomen Abdominal exam: not examined.   Extremities Exam of extremities: pedal edema 1 +     Presentation: Vertex    Cervix: Exam by: Method: sterile exam per physician   Dilation: Dilation: 4   Effacement: Cervical Effacement: 80%   Station: Station: -1         Fetal Heart Rate Assessment   Method: Fetal HR Assessment Method: external   Beats/min: Fetal HR (Beats/Min): 135   Baseline: Fetal HR Baseline: normal range (110-160 bpm)   Varibility: Fetal HR Variability: minimal (detectable: amplitude less than or equal to 5 bpm)   Accels: Fetal HR Accelerations: greater than/equal to 15 bpm, lasting at least 15 seconds   Decels: Fetal HR Decelerations: absent   Tracing Category:       Uterine Assessment   Method: Method: TOCO (external toco transducer)   Frequency (min): Contraction Frequency (min):  (indeterminate)   Ctx Count in 10 min:     Duration: Contraction Duration (sec): 60-80   Intensity: Contraction Intensity: no contractions   Intensity by IUPC:     Resting Tone: Uterine Resting Tone: soft by palpation   Resting Tone by IUPC:     [place] Units:         Assessment/Plan     Active Problems:     uterine contractions in third trimester, antepartum    Pregnancy with 36 completed weeks gestation      uterine contractions, antepartum, third trimester    Pregnant state, incidental        Assessment:  1.  Intrauterine pregnancy at 36w4d weeks gestation with reactive fetal status.    2.   labor  without ROM  3.  Obstetrical history significant for is non-contributory.  4.  GBS status: No results found for: GBSANTIGEN    Plan:  1. fetal and uterine monitoring  continuously, IV antibiotics and pitocin aumentation  2. Plan of care has been reviewed with patient and patient agrees  3.  Risks, benefits of treatment plan have been discussed.  4.  All questions have been answered.      Wilber Gomez MD  1/10/2018  7:22 AM

## 2018-01-10 NOTE — H&P
[facility]  Obstetric History and Physical    Chief Complaint   Patient presents with   • Contractions       Subjective     Patient is a 22 y.o. female  currently at 36w3d, who presents with regular contractions.    Her prenatal care is complicated by   labor .  Her previous obstetric/gynecological history is noted for is non-contributory.    The following portions of the patients history were reviewed and updated as appropriate: current medications, allergies, past medical history, past surgical history and problem list .       Prenatal Information:  Prenatal Results         Initial Prenatal Labs Ref. Range Date Time   Hemoglobin  12.8 g/dL 11.5 - 15.5 g/dL 17 1543   Hematocrit  38.2 % 34.5 - 44.0 % 17 1543   Platelets  285 10*3/mm3 150 - 450 10*3/mm3 17 1543   Rubella IgG  10.9 IU/mL IU/mL 17 1543      Immune  Immune 17 1543   Hepatitis B SAg  Non-Reactive  Non-Reactive 17 1543   Hepatitis C Ab       RPR  Non-Reactive  Non-Reactive 17 1543   ABO  A   17 1543   Rh  Positive   17 1543   Antibody Screen  Negative   17 1543   HIV  Non-Reactive  Non-Reactive 17 1543   Urine Culture  >100,000 CFU/mL Normal Urogenital Marian   17 1440   Gonorrhea       Chlamydia       TSH  1.761 mIU/mL 0.350 - 5.350 mIU/mL 17 1543   2nd and 3rd Trimester Ref. Range Date Time   Hemoglobin (repeated)       Hematocrit (repeated)       GCT  152 mg/dL 65 - 199 mg/dL 17 0858   Antibody Screen (repeated)       GTT Fasting       GTT 1 Hr       GTT 2 Hr       GTT 3 Hr       Group B Strep       Drug Screening Ref. Range Date Time   Amphetamine Screen       Barbiturate Screen       Benzodiazepine Screen       Methadone Screen       Phencyclidine Screen       Opiates Screen       THC Screen       Cocaine Screen       Propoxyphene Screen       Buprenorphine Screen       Methamphetamine Screen       Oxycodone Screen       Tryicyclic Antidepressants  Screen       Other (Risk screening) Ref. Range Date Time   Varicella IgG       Parvovirus IgG       CMV IgG       Cystic Fibrosis       Hemoglobin electrophoresis       NIPT       MSAFP-4       AFP (for NTD only)              Legend: ^: Historical            View all results for this pregnancy             Past OB History:     Obstetric History       T0      L0     SAB0   TAB0   Ectopic0   Multiple0   Live Births0       # Outcome Date GA Lbr Car/2nd Weight Sex Delivery Anes PTL Lv   1 Current                   Past Medical History: Past Medical History:   Diagnosis Date   • Anxiety    • Asthma    • Depression    • Migraine       Past Surgical History Past Surgical History:   Procedure Laterality Date   • CHOLECYSTECTOMY  2016      Family History: Family History   Problem Relation Age of Onset   • Coronary artery disease Father    • Hypertension Father    • Seizures Father    • Diabetes Father    • Diabetes Paternal Grandfather    • Breast cancer Paternal Grandmother    • Colon cancer Maternal Grandmother    • Ovarian cancer Maternal Aunt    • Multiple sclerosis Sister    • Migraines Sister    • Seizures Brother    • Coronary artery disease Maternal Uncle    • Seizures Maternal Uncle    • Diabetes Maternal Uncle    • Mental illness Maternal Uncle    • Coronary artery disease Paternal Aunt    • Diabetes Paternal Aunt    • Mental retardation Paternal Uncle    • Coronary artery disease Maternal Grandfather    • Stroke Maternal Grandfather    • Diabetes Maternal Grandfather       Social History:  reports that she has never smoked. She has never used smokeless tobacco.   reports that she does not drink alcohol.   reports that she does not use illicit drugs.        General ROS: Pertinent items are noted in HPI, all other systems reviewed and negative    Objective       Vital Signs Range for the last 24 hours  Temperature: Temp:  [98.3 °F (36.8 °C)-98.4 °F (36.9 °C)] 98.4 °F (36.9 °C)   Temp Source: Temp  src: Oral   BP: BP: (130)/(77-79) 130/79   Pulse: Heart Rate:  [87-98] 98   Respirations: Resp:  [16-18] 16   SPO2:     O2 Amount (l/min):     O2 Devices     Weight:       Physical Examination: General appearance - alert, well appearing, and in no distress  Mental status - alert, oriented to person, place, and time  Neck - supple, no significant adenopathy  Chest - clear to auscultation, no wheezes, rales or rhonchi, symmetric air entry  Heart - normal rate, regular rhythm, normal S1, S2, no murmurs, rubs, clicks or gallops  Abdomen - the uterus is gravid xiphoid -3, no other organomegaly or problems  Pelvic - cervix is 2-3 cm 70% effaced and a -1 station  Back exam - full range of motion, no tenderness, palpable spasm or pain on motion  Neurological - alert, oriented, normal speech, no focal findings or movement disorder noted  Musculoskeletal - no joint tenderness, deformity or swelling  Extremities - no pedal edema noted  Skin - normal coloration and turgor, no rashes, no suspicious skin lesions noted    Presentation: Vertex    Cervix: Exam by: Method: sterile exam per physician   Dilation: Dilation: 2.5   Effacement: Cervical Effacement: 70%   Station: Station: -1       Fetal Heart Rate Assessment   Method: Fetal HR Assessment Method: external   Beats/min: Fetal HR (Beats/Min): 145   Baseline: Fetal HR Baseline: normal range (110-160 bpm)   Varibility: Fetal HR Variability: moderate (amplitude range 6 to 25 bpm)   Accels: Fetal HR Accelerations: greater than/equal to 15 bpm, lasting at least 15 seconds   Decels: Fetal HR Decelerations: absent   Tracing Category:       Uterine Assessment   Method: Method: palpation   Frequency (min): Contraction Frequency (min): 1.5-6   Ctx Count in 10 min:     Duration: Contraction Duration (sec): 40-80   Intensity: Contraction Intensity: no contractions   Intensity by IUPC:     Resting Tone: Uterine Resting Tone: soft by palpation   Resting Tone by IUPC:     Suleman Units:        Laboratory Results: Reviewed  Radiology Review: EDC confirmed  Other Studies: None    Assessment/Plan     Active Problems:     uterine contractions in third trimester, antepartum    Pregnancy with 36 completed weeks gestation     uterine contractions, antepartum, third trimester        Assessment:  1.  Intrauterine pregnancy at 36w3d weeks gestation with reactive fetal status.    2.   labor  without ROM  3.  Obstetrical history significant for is non-contributory.  4.  GBS status: No results found for: GBSANTIGEN    Plan:  1. fetal and uterine monitoring  intermittent and expectant management  2. Plan of care has been reviewed with patient and the patient agrees  3.  Risks, benefits of treatment plan have been discussed.  4.  All questions have been answered.  5.  Will augment if the cervix changes indicating active labor      Wilber Gomez MD  2018  8:33 PM

## 2018-01-10 NOTE — L&D DELIVERY NOTE
[facility]  Vaginal Delivery Note    Delivery     Delivery: Vaginal, Spontaneous Delivery     YOB: 2017    Time of Birth: 1:59 PM      Anesthesia: Epidural     Delivering clinician: Wilber Gomez    Forceps?   No   Vacuum? No    Shoulder dystocia present: No        Delivery narrative:  Patient experienced a spontaneous vaginal delivery of a viable infant under epidural anesthesia without episiotomy.  The patient did experience a second-degree perineal laceration which was repaired.    Infant    Findings: female  infant     Infant observations: Weight: 3640 g (8 lb 0.4 oz)   Length: 19  in  Observations/Comments:         Apgars: 8   @ 1 minute /    9   @ 5 minutes   Infant Name:      Placenta, Cord, and Fluid    Placenta delivered  Spontaneous  at   1/10  2:02 PM     Cord: 3 vessels  present.   Nuchal Cord?  no   Cord blood obtained: Yes    Cord gases obtained:  No    Cord gas results: Venous:  No results found for: PHCVEN    Arterial:  No results found for: PHCART     Repair    Episiotomy: None    Lacerations: Yes  Laceration Information  Laceration Repaired?   Perineal: 2nd  Yes    Periurethral:         Labial:         Sulcus:         Vaginal: No       Cervical: No           Estimated Blood Loss: Est. Blood Loss (mL): 300 mL (Filed from Delivery Summary) (01/10/18 1175)     Suture used for repair: 2-0 chromic gut      Complications  none    Disposition  Mother to Mother Baby/Postpartum  in stable condition currently.  Baby to NBN  in stable condition currently.      Wilber Gomez MD  01/10/18  2:19 PM

## 2018-01-10 NOTE — ANESTHESIA PREPROCEDURE EVALUATION
Anesthesia Evaluation     Patient summary reviewed and Nursing notes reviewed   NPO Solid Status: > 8 hours  NPO Liquid Status: > 8 hours     Airway   Mallampati: III  TM distance: <3 FB  difficult intubation highly probable  Dental          Pulmonary    (+) asthma,   Cardiovascular - negative cardio ROS        Neuro/Psych  (+) headaches, psychiatric history Anxiety,     GI/Hepatic/Renal/Endo    (+) obesity,      Musculoskeletal (-) negative ROS    Abdominal    Substance History - negative use     OB/GYN    (+) Pregnant,         Other - negative ROS                                               Anesthesia Plan    ASA 3     epidural     Anesthetic plan and risks discussed with patient.

## 2018-01-10 NOTE — ANESTHESIA PROCEDURE NOTES
Labor Epidural    Patient location during procedure: OB  Performed By  Anesthesiologist: DAKOTA OZUNA  Preanesthetic Checklist  Completed: patient identified, surgical consent, pre-op evaluation, timeout performed, IV checked, risks and benefits discussed and monitors and equipment checked  Prep:  Pt Position:sitting  Sterile Tech:cap, gloves, mask and sterile barrier  Prep:DuraPrep  Monitoring:blood pressure monitoring  Epidural Block Procedure:  Approach:midline  Guidance:palpation technique  Location:L3-L4  Needle Type:Tuohy  Needle Gauge:17 G  Loss of Resistance Medium: air  Loss of Resistance: 6cm  Cath Depth at skin:13 cm  Paresthesia: none  Aspiration:negative  Test Dose:negative  Number of Attempts: 1  Post Assessment:  Dressing:occlusive dressing applied and secured with tape  Pt Tolerance:patient tolerated the procedure well with no apparent complications  Complications:no

## 2018-01-11 LAB
BASOPHILS # BLD AUTO: 0.01 10*3/MM3 (ref 0–0.2)
BASOPHILS NFR BLD AUTO: 0.1 % (ref 0–1)
DEPRECATED RDW RBC AUTO: 41.9 FL (ref 37–54)
EOSINOPHIL # BLD AUTO: 0.12 10*3/MM3 (ref 0–0.3)
EOSINOPHIL NFR BLD AUTO: 1.1 % (ref 0–3)
ERYTHROCYTE [DISTWIDTH] IN BLOOD BY AUTOMATED COUNT: 12.8 % (ref 11.3–14.5)
HCT VFR BLD AUTO: 30.5 % (ref 34.5–44)
HGB BLD-MCNC: 10 G/DL (ref 11.5–15.5)
IMM GRANULOCYTES # BLD: 0.02 10*3/MM3 (ref 0–0.03)
IMM GRANULOCYTES NFR BLD: 0.2 % (ref 0–0.6)
LYMPHOCYTES # BLD AUTO: 1.98 10*3/MM3 (ref 0.6–4.8)
LYMPHOCYTES NFR BLD AUTO: 18.3 % (ref 24–44)
MCH RBC QN AUTO: 29.7 PG (ref 27–31)
MCHC RBC AUTO-ENTMCNC: 32.8 G/DL (ref 32–36)
MCV RBC AUTO: 90.5 FL (ref 80–99)
MONOCYTES # BLD AUTO: 1.11 10*3/MM3 (ref 0–1)
MONOCYTES NFR BLD AUTO: 10.3 % (ref 0–12)
NEUTROPHILS # BLD AUTO: 7.58 10*3/MM3 (ref 1.5–8.3)
NEUTROPHILS NFR BLD AUTO: 70 % (ref 41–71)
PLATELET # BLD AUTO: 178 10*3/MM3 (ref 150–450)
PMV BLD AUTO: 10.9 FL (ref 6–12)
RBC # BLD AUTO: 3.37 10*6/MM3 (ref 3.89–5.14)
WBC NRBC COR # BLD: 10.82 10*3/MM3 (ref 4.5–13.5)

## 2018-01-11 PROCEDURE — 85025 COMPLETE CBC W/AUTO DIFF WBC: CPT | Performed by: OBSTETRICS & GYNECOLOGY

## 2018-01-11 PROCEDURE — 99024 POSTOP FOLLOW-UP VISIT: CPT | Performed by: OBSTETRICS & GYNECOLOGY

## 2018-01-11 RX ADMIN — IBUPROFEN 600 MG: 600 TABLET, FILM COATED ORAL at 22:30

## 2018-01-11 RX ADMIN — IBUPROFEN 600 MG: 600 TABLET, FILM COATED ORAL at 05:31

## 2018-01-11 RX ADMIN — IBUPROFEN 600 MG: 600 TABLET, FILM COATED ORAL at 14:24

## 2018-01-11 RX ADMIN — IBUPROFEN 600 MG: 600 TABLET, FILM COATED ORAL at 00:07

## 2018-01-11 RX ADMIN — DOCUSATE SODIUM 100 MG: 100 CAPSULE, LIQUID FILLED ORAL at 10:39

## 2018-01-11 NOTE — ANESTHESIA POSTPROCEDURE EVALUATION
Patient: Isidra CURRY    Procedure Summary     Date Anesthesia Start Anesthesia Stop Room / Location    01/10/18 1033 1403        Procedure Diagnosis Scheduled Providers Provider    LABOR ANALGESIA No diagnosis on file.  Isidra A DO Ahsan          Anesthesia Type: epidural  Last vitals  BP   120/72 (01/11/18 0000)   Temp   98.2 °F (36.8 °C) (01/11/18 0000)   Pulse   79 (01/11/18 0000)   Resp   16 (01/11/18 0000)     SpO2         Post Anesthesia Care and Evaluation    Patient location during evaluation: bedside  Patient participation: complete - patient participated  Level of consciousness: awake and alert  Pain management: adequate  Airway patency: patent  Anesthetic complications: No anesthetic complications    Cardiovascular status: acceptable  Respiratory status: acceptable  Hydration status: acceptable  Post Neuraxial Block status: Motor and sensory function returned to baseline and No signs or symptoms of PDPH

## 2018-01-11 NOTE — PLAN OF CARE
Problem: Patient Care Overview (Adult)  Goal: Plan of Care Review  Outcome: Ongoing (interventions implemented as appropriate)   18 0957   Coping/Psychosocial Response Interventions   Plan Of Care Reviewed With patient   Patient Care Overview   Progress improving     Goal: Adult Individualization and Mutuality  Outcome: Ongoing (interventions implemented as appropriate)    Goal: Discharge Needs Assessment  Outcome: Ongoing (interventions implemented as appropriate)      Problem: Postpartum, Vaginal Delivery (Adult)  Goal: Signs and Symptoms of Listed Potential Problems Will be Absent or Manageable (Postpartum, Vaginal Delivery)  Outcome: Ongoing (interventions implemented as appropriate)      Problem: Breastfeeding (Adult,NICU,[place],Obstetrics,Pediatric)  Goal: Signs and Symptoms of Listed Potential Problems Will be Absent or Manageable (Breastfeeding)  Outcome: Ongoing (interventions implemented as appropriate)

## 2018-01-11 NOTE — PROGRESS NOTES
[facility]    2018    Name:Isidra CURRY   MR#:[id number]    Vaginal Delivery Progress Note    HD#1    Subjective   Postpartum Day 1: 22 y.o. yo Female  delivered at 36w4d  delivered a female  infant.     The patient feels tired.  Her pain is controlled.    She ambulating well.  Patient describes her bleeding as moderate lochia.    Breastfeeding: infant latching with difficulty.     Patient Active Problem List   Diagnosis   • Chronic migraine without aura without status migrainosus, not intractable   • Chronic daily headache   • Pregnancy   • Postpartum care following vaginal delivery       Objective   Vital Signs Range for the last 24 hours  Temp: Temp:  [97.8 °F (36.6 °C)-98.6 °F (37 °C)] 98.2 °F (36.8 °C) Temp src: Oral   BP: BP: ()/(54-91) 120/72        Pulse: Heart Rate:  [] 79  RR: Resp:  [16-20] 16    Lab Results   Component Value Date    WBC 10.82 2018    HGB 10.0 (L) 2018    HCT 30.5 (L) 2018    MCV 90.5 2018     2018       Physical Exam  General:  no acute distresss.  Abdomen: Fundus: appropriate, firm, non tender Fundus: Firm with scant lochia  Extremities: no cyanosis, and 1+ edema, no CT    Perineum:  Intact    Assessment/Plan   1.  PPD# 1      Plan:  Routine Postpartum care, patient will work on breast-feeding with lactation consultants today      Wilber Gomez MD  2018 7:31 AM

## 2018-01-11 NOTE — LACTATION NOTE
This note was copied from a baby's chart.     01/10/18 8860   Maternal Infant Assessment   Size Issue, Bilateral Breasts no   Nipple Conditions, Bilateral intact   Infant Assessment   Sucking Reflex present   Rooting Reflex present   Swallow Reflex present   LATCH Score   Latch 2-->grasps breast, tongue down, lips flanged, rhythmic sucking   Audible Swallowing 1-->a few with stimulation   Type Of Nipple 2-->everted (after stimulation)   Comfort (Breast/Nipple) 2-->soft/nontender   Hold (Positioning) 1-->minimal assist, teach one side: mother does other, staff holds   Score (less than 7 for 2/more consecutive times, consult Lactation Consultant) 8   Maternal Infant Feeding   Previous Breastfeeding History no   Feeding Infant   Feeding Readiness Cues rooting   Effective Latch During Feeding yes   Audible Swallow yes   Suck/Swallow Coordination present   Skin-to-Skin Contact During Feeding yes   Equipment Type/Education   Breast Pump Type double electric, personal    Breastfeeding   Breast Pumping Interventions post-feed pumping encouraged  (r/t late pre term )

## 2018-01-12 VITALS
SYSTOLIC BLOOD PRESSURE: 120 MMHG | RESPIRATION RATE: 14 BRPM | HEART RATE: 74 BPM | TEMPERATURE: 98.2 F | DIASTOLIC BLOOD PRESSURE: 79 MMHG

## 2018-01-12 PROCEDURE — 99024 POSTOP FOLLOW-UP VISIT: CPT | Performed by: OBSTETRICS & GYNECOLOGY

## 2018-01-12 RX ORDER — IBUPROFEN 600 MG/1
600 TABLET ORAL EVERY 6 HOURS PRN
Qty: 20 TABLET | Refills: 2 | Status: SHIPPED | OUTPATIENT
Start: 2018-01-12 | End: 2018-02-21

## 2018-01-12 RX ORDER — OXYCODONE HYDROCHLORIDE AND ACETAMINOPHEN 5; 325 MG/1; MG/1
1 TABLET ORAL EVERY 4 HOURS PRN
Qty: 12 TABLET | Refills: 0 | Status: SHIPPED | OUTPATIENT
Start: 2018-01-12 | End: 2018-01-20

## 2018-01-12 RX ADMIN — WITCH HAZEL 1 PAD: 500 SOLUTION RECTAL; TOPICAL at 12:36

## 2018-01-12 RX ADMIN — IBUPROFEN 600 MG: 600 TABLET, FILM COATED ORAL at 09:36

## 2018-01-12 RX ADMIN — BENZOCAINE AND MENTHOL: 20; .5 SPRAY TOPICAL at 12:36

## 2018-01-12 RX ADMIN — DOCUSATE SODIUM 100 MG: 100 CAPSULE, LIQUID FILLED ORAL at 09:36

## 2018-01-12 NOTE — PLAN OF CARE
Problem: Patient Care Overview (Adult)  Goal: Plan of Care Review  Outcome: Outcome(s) achieved Date Met: 18 1057   Coping/Psychosocial Response Interventions   Plan Of Care Reviewed With patient;significant other   Patient Care Overview   Progress improving     Goal: Adult Individualization and Mutuality  Outcome: Outcome(s) achieved Date Met: 18    Goal: Discharge Needs Assessment  Outcome: Outcome(s) achieved Date Met: 18      Problem: Postpartum, Vaginal Delivery (Adult)  Goal: Signs and Symptoms of Listed Potential Problems Will be Absent or Manageable (Postpartum, Vaginal Delivery)  Outcome: Outcome(s) achieved Date Met: 18      Problem: Breastfeeding (Adult,NICU,[place],Obstetrics,Pediatric)  Goal: Signs and Symptoms of Listed Potential Problems Will be Absent or Manageable (Breastfeeding)  Outcome: Outcome(s) achieved Date Met: 18

## 2018-01-12 NOTE — DISCHARGE SUMMARY
[facility] [place]  Vaginal delivery Discharge Summary      Date of Admission: 2018    Date of Discharge:  2018    Patient: Isidra CURRY      MR#:[id number]    Surgeon/OB: Wilber Gomez     Discharge Diagnosis: Vaginal Delivery at 36w4d, uncomplicated recovery    Procedures:  Vaginal, Spontaneous Delivery     1/10/2018    1:59 PM      Anesthesia:  Epidural     Presenting Problem/History of Present Illness   uterine contractions, antepartum, third trimester [O47.03]  Pregnant state, incidental [Z33.1]     Patient Active Problem List   Diagnosis   • Chronic migraine without aura without status migrainosus, not intractable   • Chronic daily headache   • Pregnancy   • Postpartum care following vaginal delivery       Hospital Course  Patient is a 22 y.o. female  at 36w4d status post vaginal delivery.    Uneventful recovery.  Patient is ambulating, tolerating a regular diet.  Perineum is intact. She is bottlefeeding and the baby is doing well.  She wants birth control pills for contraception and will start these after her two-week checkup.    Infant:   female  fetus 3640 g (8 lb 0.4 oz)  with Apgar scores of 8  , 9   at five minutes.    Condition on Discharge:  Stable    Vital Signs  Temp:  [97.7 °F (36.5 °C)-98.2 °F (36.8 °C)] 98.2 °F (36.8 °C)  Heart Rate:  [74-86] 74  Resp:  [14-20] 14  BP: (120-135)/(75-79) 120/79    Lab Results   Component Value Date    WBC 10.82 2018    HGB 10.0 (L) 2018    HCT 30.5 (L) 2018    MCV 90.5 2018     2018      The patient is O+, rubella immune, and hepatitis B, C negative    Discharge Disposition  Home or Self Care    Discharge Medications   Isidra CURRY   Home Medication Instructions BLANK:[id number]    Printed on:18 0805   Medication Information                      albuterol (PROVENTIL) (2.5 MG/3ML) 0.083% nebulizer solution  Take 2.5 mg by nebulization  Every 4 (Four) Hours As Needed for Wheezing.             cetirizine-pseudoephedrine (ZyrTEC-D) 5-120 MG per 12 hr tablet  Take 1 tablet by mouth 2 (Two) Times a Day.             ibuprofen (ADVIL,MOTRIN) 600 MG tablet  Take 1 tablet by mouth Every 6 (Six) Hours As Needed for Mild Pain .             magnesium oxide (MAG-OX) 400 MG tablet  Take 1 tablet by mouth Every Night.             omeprazole (PRILOSEC) 20 MG capsule  Take 1 capsule by mouth Daily.             oxyCODONE-acetaminophen (PERCOCET) 5-325 MG per tablet  Take 1 tablet by mouth Every 4 (Four) Hours As Needed for Severe Pain  for up to 8 days.             Prenatal Vit-Fe Fumarate-FA (PRENATAL VITAMIN 27-0.8) 27-0.8 MG tablet tablet  Take 1 tablet by mouth Daily.                 Discharge Diet: Regular    Activity at Discharge:   Activity Instructions     Discharge Activity Restrictions       1) No driving for 2 weeks and no longer taking narcotics.   2) Return to school / work in 6 weeks.  3) May shower   4) Do not lift / push / pull more then 15 lbs.       Pelvic Rest       Number of days:  30                 Follow-up Appointments  Future Appointments  Date Time Provider Department Center   3/12/2018 11:00 AM [facility] None     Additional Instructions for the Follow-ups that You Need to Schedule     Discharge Follow-up with Specified Provider: Dr. Gomez; 2 Weeks    As directed    To:  Dr. Gomez    Follow Up:  2 Weeks                       Wilber Gomez MD  01/12/18  8:05 AM

## 2018-01-24 ENCOUNTER — POSTPARTUM VISIT (OUTPATIENT)
Dept: OBSTETRICS AND GYNECOLOGY | Facility: CLINIC | Age: 23
End: 2018-01-24

## 2018-01-24 VITALS — BODY MASS INDEX: 33.57 KG/M2 | DIASTOLIC BLOOD PRESSURE: 60 MMHG | WEIGHT: 208 LBS | SYSTOLIC BLOOD PRESSURE: 108 MMHG

## 2018-01-24 DIAGNOSIS — Z30.011 BCP (BIRTH CONTROL PILLS) INITIATION: ICD-10-CM

## 2018-01-24 PROCEDURE — 0503F POSTPARTUM CARE VISIT: CPT | Performed by: OBSTETRICS & GYNECOLOGY

## 2018-01-24 RX ORDER — NORGESTIMATE AND ETHINYL ESTRADIOL 7DAYSX3 28
1 KIT ORAL DAILY
Qty: 28 TABLET | Refills: 12 | Status: SHIPPED | OUTPATIENT
Start: 2018-01-24 | End: 2018-03-12

## 2018-01-24 RX ORDER — NORGESTIMATE AND ETHINYL ESTRADIOL 7DAYSX3 28
1 KIT ORAL DAILY
Qty: 28 TABLET | Refills: 12 | Status: SHIPPED | OUTPATIENT
Start: 2018-01-24 | End: 2018-01-24 | Stop reason: SDUPTHER

## 2018-01-24 NOTE — PROGRESS NOTES
Subjective   Isidra CURRY is a 22 y.o. female.     History of Present Illness  The patient is 2 weeks post vaginal delivery, bottle feeding, no postpartum depression, and no problems. Patient wants BCP's      The following portions of the patient's history were reviewed and updated as appropriate: allergies, current medications, past family history, past medical history, past social history, past surgical history and problem list.    Review of Systems   Constitutional: Negative.    Respiratory: Negative.    Cardiovascular: Negative.    Gastrointestinal: Negative.    Genitourinary: Positive for vaginal bleeding. Negative for decreased urine volume, difficulty urinating, dyspareunia, dysuria, enuresis, flank pain, frequency, genital sores, hematuria, menstrual problem, pelvic pain, urgency, vaginal discharge and vaginal pain.   Psychiatric/Behavioral: Negative.        Objective   Physical Exam   Constitutional: She appears well-developed and well-nourished.   Nursing note and vitals reviewed.      Assessment/Plan   Isidra was seen today for postpartum care.    Diagnoses and all orders for this visit:    Routine postpartum follow-up    BCP (birth control pills) initiation  -     norgestimate-ethinyl estradiol (ORTHO TRI-CYCLEN, 28,) 0.18/0.215/0.25 MG-35 MCG per tablet; Take 1 tablet by mouth Daily.       Return 4 weeks    Wilber Gomez MD

## 2018-02-21 ENCOUNTER — POSTPARTUM VISIT (OUTPATIENT)
Dept: OBSTETRICS AND GYNECOLOGY | Facility: CLINIC | Age: 23
End: 2018-02-21

## 2018-02-21 VITALS
HEIGHT: 66 IN | SYSTOLIC BLOOD PRESSURE: 110 MMHG | WEIGHT: 214 LBS | BODY MASS INDEX: 34.39 KG/M2 | DIASTOLIC BLOOD PRESSURE: 76 MMHG

## 2018-02-21 PROCEDURE — 0503F POSTPARTUM CARE VISIT: CPT | Performed by: OBSTETRICS & GYNECOLOGY

## 2018-02-21 NOTE — PROGRESS NOTES
Subjective   Isidra CURRY is a 22 y.o. female.     History of Present Illness  Isidra is 6 weeks post vaginal delivery, bottle feeding, oral contraceptives for birth control, and is having no postpartum depression.  Patient passed vaginal tissue a week ago and is still having vaginal pain.      The following portions of the patient's history were reviewed and updated as appropriate: allergies, current medications, past family history, past medical history, past social history, past surgical history and problem list.    Review of Systems   Constitutional: Negative.    Cardiovascular: Negative.    Gastrointestinal: Negative.    Genitourinary: Positive for vaginal bleeding and vaginal pain. Negative for decreased urine volume, difficulty urinating, dyspareunia, dysuria, enuresis, flank pain, frequency, genital sores, hematuria, menstrual problem, pelvic pain, urgency and vaginal discharge.   Psychiatric/Behavioral: Negative.        Objective   Physical Exam   Constitutional: She appears well-developed and well-nourished.   Pulmonary/Chest: Right breast exhibits no inverted nipple, no mass, no nipple discharge, no skin change and no tenderness. Left breast exhibits no inverted nipple, no mass, no nipple discharge, no skin change and no tenderness.   Genitourinary: Vagina normal. Pelvic exam was performed with patient supine. No labial fusion. There is no rash, tenderness, lesion or injury on the right labia. There is no rash, tenderness, lesion or injury on the left labia. Uterus is not deviated, not enlarged, not fixed and not tender. Cervix exhibits no motion tenderness, no discharge and no friability. Right adnexum displays no mass, no tenderness and no fullness. Left adnexum displays no mass, no tenderness and no fullness.       Nursing note and vitals reviewed.      Assessment/Plan   Isidra was seen today for postpartum care.    Diagnoses and all orders for this visit:    Routine postpartum follow-up        Return as needed    Wilber Gomez MD

## 2018-03-12 ENCOUNTER — OFFICE VISIT (OUTPATIENT)
Dept: NEUROLOGY | Facility: CLINIC | Age: 23
End: 2018-03-12

## 2018-03-12 ENCOUNTER — TELEPHONE (OUTPATIENT)
Dept: NEUROLOGY | Facility: CLINIC | Age: 23
End: 2018-03-12

## 2018-03-12 VITALS
BODY MASS INDEX: 33.59 KG/M2 | HEART RATE: 67 BPM | HEIGHT: 66 IN | SYSTOLIC BLOOD PRESSURE: 108 MMHG | DIASTOLIC BLOOD PRESSURE: 74 MMHG | WEIGHT: 209 LBS | OXYGEN SATURATION: 98 %

## 2018-03-12 DIAGNOSIS — G43.709 CHRONIC MIGRAINE WITHOUT AURA WITHOUT STATUS MIGRAINOSUS, NOT INTRACTABLE: Primary | ICD-10-CM

## 2018-03-12 PROCEDURE — 99213 OFFICE O/P EST LOW 20 MIN: CPT | Performed by: NURSE PRACTITIONER

## 2018-03-12 RX ORDER — NORGESTIMATE AND ETHINYL ESTRADIOL 7DAYSX3 28
1 KIT ORAL DAILY
COMMUNITY
End: 2019-01-29 | Stop reason: SDUPTHER

## 2018-03-12 RX ORDER — AMITRIPTYLINE HYDROCHLORIDE 10 MG/1
10-20 TABLET, FILM COATED ORAL NIGHTLY
Qty: 60 TABLET | Refills: 5 | Status: SHIPPED | OUTPATIENT
Start: 2018-03-12

## 2018-03-12 RX ORDER — SUMATRIPTAN 50 MG/1
TABLET, FILM COATED ORAL
Qty: 9 TABLET | Refills: 5 | Status: SHIPPED | OUTPATIENT
Start: 2018-03-12

## 2018-03-12 NOTE — TELEPHONE ENCOUNTER
Patient's PCP is with [facility] in [place], [place] phone#[phone]. The last name is Vipin-she is unsure first name. Can we find who this is and get their fax number and fax them today's visit note and also put them in our system as her PCP. thanks

## 2018-03-12 NOTE — PROGRESS NOTES
"Subjective:     Patient ID: Isidra CURRY is a 22 y.o. female.    CC:   Chief Complaint   Patient presents with   • Migraine       HPI:   History of Present Illness     This is a 22-year-old female who presents for six-month follow-up on migraine headaches present for the past 5 years.  She is about 8 weeks postpartum.  She is feeding her baby formula and not breast-feeding.  She tells me she is having migraines about 3-4 days per week.  Her migraines are in the frontal region and fluctuate right versus left.  She has throbbing, moderate to severe pain, nausea, occasional vomiting, positive photophobia and positive phonophobia, headaches last 4-24 hours.  She has been taking ibuprofen as needed and this is not really helping.  She tells me previously she took Topamax 50 mg for migraine prevention in the past and this really did not help her much either.  As far as she is aware she has never taken a triptan for migraines and she has not taken any other preventive medications.  She did have an MRI of the brain in 2012 which was within normal limits and reviewed last visit.  Her primary care provider is with [facility] primary care in [place], [place] but she is unsure the providers name.  On exam today her right hand has decreased range of motion and some swelling and she tells me \"I accidentally punched a wall\".  She tells me that her primary care provider is addressing this issue and I offered to order an EMG and NCVS as well as x-rays and she tells me she will decline today but will let me know if we need to order anything in the future.  She was taking magnesium oxide during her pregnancy and felt like this helps some with her headaches and would like a refill on this.    The following portions of the patient's history were reviewed and updated as appropriate: allergies, current medications, past family history, past medical history, past social history, past surgical history and problem list.    Past Medical " History:   Diagnosis Date   • Anxiety    • Asthma    • Depression    • Migraine        Past Surgical History:   Procedure Laterality Date   • CHOLECYSTECTOMY  03/08/2016       Social History     Social History   • Marital status:      Spouse name: N/A   • Number of children: N/A   • Years of education: N/A     Occupational History   • Not on file.     Social History Main Topics   • Smoking status: Never Smoker   • Smokeless tobacco: Never Used   • Alcohol use No   • Drug use: No   • Sexual activity: Yes     Partners: Male     Other Topics Concern   • Not on file     Social History Narrative   • No narrative on file       Family History   Problem Relation Age of Onset   • Coronary artery disease Father    • Hypertension Father    • Seizures Father    • Diabetes Father    • Diabetes Paternal Grandfather    • Breast cancer Paternal Grandmother    • Colon cancer Maternal Grandmother    • Ovarian cancer Maternal Aunt    • Multiple sclerosis Sister    • Migraines Sister    • Seizures Brother    • Coronary artery disease Maternal Uncle    • Seizures Maternal Uncle    • Diabetes Maternal Uncle    • Mental illness Maternal Uncle    • Coronary artery disease Paternal Aunt    • Diabetes Paternal Aunt    • Mental retardation Paternal Uncle    • Coronary artery disease Maternal Grandfather    • Stroke Maternal Grandfather    • Diabetes Maternal Grandfather         Review of Systems   Constitutional: Negative.    Eyes: Positive for photophobia.   Respiratory: Negative.    Cardiovascular: Negative.    Gastrointestinal: Negative.    Endocrine: Negative.    Genitourinary: Negative.    Musculoskeletal: Negative.    Skin: Negative.    Allergic/Immunologic: Negative.    Neurological: Positive for headaches.   Hematological: Bruises/bleeds easily.   Psychiatric/Behavioral: The patient is nervous/anxious.         Objective:    Neurologic Exam     Mental Status   Level of consciousness: alert    Cranial Nerves   Cranial nerves II  through XII intact.     Motor Exam   Muscle bulk: normal  Overall muscle tone: normal    Strength   Strength 5/5 except as noted.   Right strength: Decreased ROM/swelling right hand    Gait, Coordination, and Reflexes     Gait  Gait: normal    Coordination   Finger to nose coordination: normal  Heel to shin coordination: normal    Tremor   Resting tremor: absent  Intention tremor: absent  Action tremor: absent    Reflexes   Right brachioradialis: 2+  Left brachioradialis: 2+  Right biceps: 2+  Left biceps: 2+  Right triceps: 2+  Left triceps: 2+  Right patellar: 2+  Left patellar: 2+  Right achilles: 2+  Left achilles: 2+  Right : 1+ (decreased ROM d/t recent hand injury)  Left : 2+      Physical Exam   Neurological: She has a normal Finger-Nose-Finger Test and a normal Heel to Shin Test. Gait normal.   Reflex Scores:       Tricep reflexes are 2+ on the right side and 2+ on the left side.       Bicep reflexes are 2+ on the right side and 2+ on the left side.       Brachioradialis reflexes are 2+ on the right side and 2+ on the left side.       Patellar reflexes are 2+ on the right side and 2+ on the left side.       Achilles reflexes are 2+ on the right side and 2+ on the left side.      Assessment/Plan:       Isidra was seen today for migraine.    Diagnoses and all orders for this visit:    Chronic migraine without aura without status migrainosus, not intractable  -     amitriptyline (ELAVIL) 10 MG tablet; Take 1-2 tablets by mouth Every Night.  -     SUMAtriptan (IMITREX) 50 MG tablet; Take one tablet at onset of headache. May repeat dose one time in 2 hours if headache not relieved.  -     magnesium oxide (MAGOX) 400 (241.3 Mg) MG tablet tablet; Take 1 tablet by mouth Every Night.         I have encouraged her to follow-up with her PCP regarding her right hand limited range of motion and swelling.  If she does need to have an EMG and NCV S completed I am happy to order this and have it done at [facility] and she will let us know.  For her headaches we will start amitriptyline 10 mg 1-2 pills at night for migraine prevention.  We will restart the magnesium oxide 400 mg at night which she did take during pregnancy and says it helps some.  We will have sumatriptan and for her to take as needed for headaches.  We will follow-up in 8 weeks for reevaluation of symptoms.  I've explained to her that the amitriptyline is an antidepressant and that if she has any worsening depression or anxiety or changes and mood to let me know and to stop the medication. She verbalizes understanding and agrees with plan. Reviewed medications, potential side effects and signs and symptoms to report. Discussed risk versus benefits of treatment plan with patient and/or family-including medications, labs and radiology that may be ordered. Addressed questions and concerns during visit. Patient and/or family verbalized understanding and agree with plan.  Total time of visit 15 minutes face-to-face with 10 minutes spent in discussion and counseling on plan of care.    EMR Dragon/Transcription Disclaimer:  Much of this encounter note is an electronic transcription of spoken language to printed text. Electronic transcription of spoken language may permit erroneous words or phrases to be inadvertently transcribed. Although I have reviewed the note for such errors, some may still exist in this documentation.      Consuelo Weaver, APRN  3/12/2018

## 2018-07-12 ENCOUNTER — HOSPITAL ENCOUNTER (INPATIENT)
Facility: HOSPITAL | Age: 23
LOS: 4 days | Discharge: HOME | End: 2018-07-17
Attending: EMERGENCY MEDICINE | Admitting: INTERNAL MEDICINE

## 2018-07-12 DIAGNOSIS — G61.0 GUILLAIN BARRÉ SYNDROME (HCC): Primary | ICD-10-CM

## 2018-07-12 DIAGNOSIS — Z74.09 IMPAIRED FUNCTIONAL MOBILITY, BALANCE, GAIT, AND ENDURANCE: ICD-10-CM

## 2018-07-12 DIAGNOSIS — Z74.09 IMPAIRED MOBILITY AND ADLS: ICD-10-CM

## 2018-07-12 DIAGNOSIS — Z78.9 IMPAIRED MOBILITY AND ADLS: ICD-10-CM

## 2018-07-12 PROCEDURE — 99284 EMERGENCY DEPT VISIT MOD MDM: CPT

## 2018-07-13 ENCOUNTER — APPOINTMENT (OUTPATIENT)
Dept: MRI IMAGING | Facility: HOSPITAL | Age: 23
End: 2018-07-13

## 2018-07-13 ENCOUNTER — APPOINTMENT (OUTPATIENT)
Dept: GENERAL RADIOLOGY | Facility: HOSPITAL | Age: 23
End: 2018-07-13

## 2018-07-13 PROBLEM — J45.909 ASTHMA: Status: ACTIVE | Noted: 2018-07-13

## 2018-07-13 PROBLEM — F32.A ANXIETY AND DEPRESSION: Status: ACTIVE | Noted: 2018-07-13

## 2018-07-13 PROBLEM — R29.898 LOWER EXTREMITY WEAKNESS: Status: ACTIVE | Noted: 2018-07-13

## 2018-07-13 PROBLEM — F41.9 ANXIETY AND DEPRESSION: Status: ACTIVE | Noted: 2018-07-13

## 2018-07-13 PROBLEM — G61.0 GUILLAIN BARRÉ SYNDROME (HCC): Status: ACTIVE | Noted: 2018-07-13

## 2018-07-13 LAB
ALBUMIN SERPL-MCNC: 3.78 G/DL (ref 3.2–4.8)
ALBUMIN/GLOB SERPL: 1.4 G/DL (ref 1.5–2.5)
ALP SERPL-CCNC: 86 U/L (ref 25–100)
ALT SERPL W P-5'-P-CCNC: 13 U/L (ref 7–40)
ANION GAP SERPL CALCULATED.3IONS-SCNC: 6 MMOL/L (ref 3–11)
APPEARANCE CSF: CLEAR
AST SERPL-CCNC: 13 U/L (ref 0–33)
B-HCG UR QL: NEGATIVE
BASOPHILS # BLD AUTO: 0.01 10*3/MM3 (ref 0–0.2)
BASOPHILS NFR BLD AUTO: 0.1 % (ref 0–1)
BILIRUB SERPL-MCNC: 0.6 MG/DL (ref 0.3–1.2)
BILIRUB UR QL STRIP: NEGATIVE
BUN BLD-MCNC: 8 MG/DL (ref 9–23)
BUN/CREAT SERPL: 9.8 (ref 7–25)
CALCIUM SPEC-SCNC: 8.3 MG/DL (ref 8.7–10.4)
CHLORIDE SERPL-SCNC: 108 MMOL/L (ref 99–109)
CLARITY UR: CLEAR
CO2 SERPL-SCNC: 25 MMOL/L (ref 20–31)
COLOR CSF: COLORLESS
COLOR UR: YELLOW
CREAT BLD-MCNC: 0.82 MG/DL (ref 0.6–1.3)
DEPRECATED RDW RBC AUTO: 42.2 FL (ref 37–54)
EOSINOPHIL # BLD AUTO: 0.09 10*3/MM3 (ref 0–0.3)
EOSINOPHIL NFR BLD AUTO: 1.1 % (ref 0–3)
ERYTHROCYTE [DISTWIDTH] IN BLOOD BY AUTOMATED COUNT: 12.7 % (ref 11.3–14.5)
ERYTHROCYTE [SEDIMENTATION RATE] IN BLOOD: 15 MM/HR (ref 0–20)
GFR SERPL CREATININE-BSD FRML MDRD: 87 ML/MIN/1.73
GLOBULIN UR ELPH-MCNC: 2.6 GM/DL
GLUCOSE BLD-MCNC: 108 MG/DL (ref 70–100)
GLUCOSE CSF-MCNC: 60 MG/DL (ref 40–70)
GLUCOSE UR STRIP-MCNC: NEGATIVE MG/DL
HCT VFR BLD AUTO: 40.1 % (ref 34.5–44)
HGB BLD-MCNC: 13.3 G/DL (ref 11.5–15.5)
HGB UR QL STRIP.AUTO: NEGATIVE
IMM GRANULOCYTES # BLD: 0.02 10*3/MM3 (ref 0–0.03)
IMM GRANULOCYTES NFR BLD: 0.3 % (ref 0–0.6)
INTERNAL NEGATIVE CONTROL: NEGATIVE
INTERNAL POSITIVE CONTROL: POSITIVE
KETONES UR QL STRIP: ABNORMAL
LEUKOCYTE ESTERASE UR QL STRIP.AUTO: NEGATIVE
LYMPHOCYTES # BLD AUTO: 2.73 10*3/MM3 (ref 0.6–4.8)
LYMPHOCYTES NFR BLD AUTO: 34.2 % (ref 24–44)
Lab: NORMAL
MCH RBC QN AUTO: 30.1 PG (ref 27–31)
MCHC RBC AUTO-ENTMCNC: 33.2 G/DL (ref 32–36)
MCV RBC AUTO: 90.7 FL (ref 80–99)
MONOCYTES # BLD AUTO: 0.59 10*3/MM3 (ref 0–1)
MONOCYTES NFR BLD AUTO: 7.4 % (ref 0–12)
NEUTROPHILS # BLD AUTO: 4.57 10*3/MM3 (ref 1.5–8.3)
NEUTROPHILS NFR BLD AUTO: 57.2 % (ref 41–71)
NITRITE UR QL STRIP: NEGATIVE
PH UR STRIP.AUTO: 5.5 [PH] (ref 5–8)
PLATELET # BLD AUTO: 305 10*3/MM3 (ref 150–450)
PMV BLD AUTO: 10.1 FL (ref 6–12)
POTASSIUM BLD-SCNC: 3.8 MMOL/L (ref 3.5–5.5)
PROT CSF-MCNC: 17 MG/DL (ref 15–45)
PROT SERPL-MCNC: 6.4 G/DL (ref 5.7–8.2)
PROT UR QL STRIP: NEGATIVE
RBC # BLD AUTO: 4.42 10*6/MM3 (ref 3.89–5.14)
RBC # CSF MANUAL: 1 /MM3 (ref 0–5)
SODIUM BLD-SCNC: 139 MMOL/L (ref 132–146)
SP GR UR STRIP: 1.02 (ref 1–1.03)
TUBE # CSF: 4
UROBILINOGEN UR QL STRIP: ABNORMAL
WBC # CSF MANUAL: 1.5 /MM3 (ref 0–5)
WBC NRBC COR # BLD: 7.99 10*3/MM3 (ref 3.5–10.8)

## 2018-07-13 PROCEDURE — 72148 MRI LUMBAR SPINE W/O DYE: CPT

## 2018-07-13 PROCEDURE — 81003 URINALYSIS AUTO W/O SCOPE: CPT | Performed by: EMERGENCY MEDICINE

## 2018-07-13 PROCEDURE — 0 GADOBENATE DIMEGLUMINE 529 MG/ML SOLUTION: Performed by: INTERNAL MEDICINE

## 2018-07-13 PROCEDURE — 86256 FLUORESCENT ANTIBODY TITER: CPT | Performed by: PSYCHIATRY & NEUROLOGY

## 2018-07-13 PROCEDURE — 009U3ZX DRAINAGE OF SPINAL CANAL, PERCUTANEOUS APPROACH, DIAGNOSTIC: ICD-10-PCS | Performed by: EMERGENCY MEDICINE

## 2018-07-13 PROCEDURE — 70553 MRI BRAIN STEM W/O & W/DYE: CPT

## 2018-07-13 PROCEDURE — G0378 HOSPITAL OBSERVATION PER HR: HCPCS

## 2018-07-13 PROCEDURE — 99223 1ST HOSP IP/OBS HIGH 75: CPT | Performed by: INTERNAL MEDICINE

## 2018-07-13 PROCEDURE — G8978 MOBILITY CURRENT STATUS: HCPCS

## 2018-07-13 PROCEDURE — 81025 URINE PREGNANCY TEST: CPT | Performed by: EMERGENCY MEDICINE

## 2018-07-13 PROCEDURE — A9577 INJ MULTIHANCE: HCPCS | Performed by: INTERNAL MEDICINE

## 2018-07-13 PROCEDURE — 72156 MRI NECK SPINE W/O & W/DYE: CPT

## 2018-07-13 PROCEDURE — 83520 IMMUNOASSAY QUANT NOS NONAB: CPT | Performed by: PSYCHIATRY & NEUROLOGY

## 2018-07-13 PROCEDURE — 87070 CULTURE OTHR SPECIMN AEROBIC: CPT | Performed by: EMERGENCY MEDICINE

## 2018-07-13 PROCEDURE — 84157 ASSAY OF PROTEIN OTHER: CPT | Performed by: EMERGENCY MEDICINE

## 2018-07-13 PROCEDURE — 85025 COMPLETE CBC W/AUTO DIFF WBC: CPT | Performed by: EMERGENCY MEDICINE

## 2018-07-13 PROCEDURE — 0 GADOBENATE DIMEGLUMINE 529 MG/ML SOLUTION: Performed by: EMERGENCY MEDICINE

## 2018-07-13 PROCEDURE — 84311 SPECTROPHOTOMETRY: CPT | Performed by: PSYCHIATRY & NEUROLOGY

## 2018-07-13 PROCEDURE — 97110 THERAPEUTIC EXERCISES: CPT

## 2018-07-13 PROCEDURE — 97162 PT EVAL MOD COMPLEX 30 MIN: CPT

## 2018-07-13 PROCEDURE — B01B1ZZ FLUOROSCOPY OF SPINAL CORD USING LOW OSMOLAR CONTRAST: ICD-10-PCS | Performed by: EMERGENCY MEDICINE

## 2018-07-13 PROCEDURE — 97165 OT EVAL LOW COMPLEX 30 MIN: CPT

## 2018-07-13 PROCEDURE — 77003 FLUOROGUIDE FOR SPINE INJECT: CPT

## 2018-07-13 PROCEDURE — 84165 PROTEIN E-PHORESIS SERUM: CPT | Performed by: PSYCHIATRY & NEUROLOGY

## 2018-07-13 PROCEDURE — 72157 MRI CHEST SPINE W/O & W/DYE: CPT

## 2018-07-13 PROCEDURE — 89050 BODY FLUID CELL COUNT: CPT | Performed by: EMERGENCY MEDICINE

## 2018-07-13 PROCEDURE — G8979 MOBILITY GOAL STATUS: HCPCS

## 2018-07-13 PROCEDURE — 87015 SPECIMEN INFECT AGNT CONCNTJ: CPT | Performed by: EMERGENCY MEDICINE

## 2018-07-13 PROCEDURE — 86038 ANTINUCLEAR ANTIBODIES: CPT | Performed by: PSYCHIATRY & NEUROLOGY

## 2018-07-13 PROCEDURE — 99255 IP/OBS CONSLTJ NEW/EST HI 80: CPT | Performed by: PSYCHIATRY & NEUROLOGY

## 2018-07-13 PROCEDURE — 82945 GLUCOSE OTHER FLUID: CPT | Performed by: EMERGENCY MEDICINE

## 2018-07-13 PROCEDURE — A9577 INJ MULTIHANCE: HCPCS | Performed by: EMERGENCY MEDICINE

## 2018-07-13 PROCEDURE — 80053 COMPREHEN METABOLIC PANEL: CPT | Performed by: EMERGENCY MEDICINE

## 2018-07-13 PROCEDURE — 87205 SMEAR GRAM STAIN: CPT | Performed by: EMERGENCY MEDICINE

## 2018-07-13 RX ORDER — SODIUM CHLORIDE 0.9 % (FLUSH) 0.9 %
1-10 SYRINGE (ML) INJECTION AS NEEDED
Status: DISCONTINUED | OUTPATIENT
Start: 2018-07-13 | End: 2018-07-17 | Stop reason: HOSPADM

## 2018-07-13 RX ORDER — LIDOCAINE HYDROCHLORIDE 10 MG/ML
10 INJECTION, SOLUTION INFILTRATION; PERINEURAL ONCE
Status: COMPLETED | OUTPATIENT
Start: 2018-07-13 | End: 2018-07-13

## 2018-07-13 RX ORDER — LIDOCAINE HYDROCHLORIDE 10 MG/ML
5 INJECTION, SOLUTION EPIDURAL; INFILTRATION; INTRACAUDAL; PERINEURAL ONCE
Status: DISCONTINUED | OUTPATIENT
Start: 2018-07-13 | End: 2018-07-17 | Stop reason: HOSPADM

## 2018-07-13 RX ORDER — ACETAMINOPHEN 325 MG/1
650 TABLET ORAL EVERY 4 HOURS PRN
Status: DISCONTINUED | OUTPATIENT
Start: 2018-07-13 | End: 2018-07-17 | Stop reason: HOSPADM

## 2018-07-13 RX ORDER — SODIUM CHLORIDE 9 MG/ML
75 INJECTION, SOLUTION INTRAVENOUS CONTINUOUS
Status: DISCONTINUED | OUTPATIENT
Start: 2018-07-13 | End: 2018-07-13

## 2018-07-13 RX ORDER — TRAMADOL HYDROCHLORIDE 50 MG/1
50 TABLET ORAL EVERY 6 HOURS PRN
Status: DISCONTINUED | OUTPATIENT
Start: 2018-07-13 | End: 2018-07-14

## 2018-07-13 RX ADMIN — TRAMADOL HYDROCHLORIDE 50 MG: 50 TABLET, COATED ORAL at 06:47

## 2018-07-13 RX ADMIN — GADOBENATE DIMEGLUMINE 15 ML: 529 INJECTION, SOLUTION INTRAVENOUS at 18:15

## 2018-07-13 RX ADMIN — TRAMADOL HYDROCHLORIDE 50 MG: 50 TABLET, COATED ORAL at 21:08

## 2018-07-13 RX ADMIN — GADOBENATE DIMEGLUMINE 20 ML: 529 INJECTION, SOLUTION INTRAVENOUS at 01:50

## 2018-07-13 RX ADMIN — LIDOCAINE HYDROCHLORIDE 10 ML: 10 INJECTION, SOLUTION INFILTRATION; PERINEURAL at 03:30

## 2018-07-13 NOTE — DISCHARGE PLACEMENT REQUEST
"Isidra Curry (23 y.o. Female)     Date of Birth Social Security Number Address Home Phone MRN    1995  [address]   [facility] [address] [phone] [id number]    Spiritism Marital Status          None        Admission Date Admission Type Admitting Provider Attending Provider Department, Room/Bed    18 Emergency Sonia Perez MD Opi[place], [place], MD [facility] 5B, N532/1    Discharge Date Discharge Disposition Discharge Destination                       Attending Provider:  Sonia Perez MD    Allergies:  No Known Allergies    Isolation:  None   Infection:  None   Code Status:  CPR    Ht:  168.9 cm (66.5\")   Wt:  101 kg (221 lb 9 oz)    Admission Cmt:  None   Principal Problem:  Lower extremity weakness [R29.898]                 Active Insurance as of 2018     Primary Coverage     Payor Plan Insurance Group Employer/Plan Group    [employer] [place] [employer] [place]ICA[place]      Payor Plan Address Payor Plan Phone Number Effective From Effective To    [address] [address] [phone] 6/15/2017     [facility] [id number]       Subscriber Name Subscriber Birth Date Member ID       ISIDRA CURRY 1995 [id number]                 Emergency Contacts      (Rel.) Home Phone Work Phone [place] Phone    Wilber Basilio (Spouse) [phone] -- --               History & Physical      Sonia Perez MD at 2018  4:18 AM              [facility]  HISTORY AND PHYSICAL    Patient Name: Isidra Curry  : 1995  MRN: [id number]  Primary Care Physician: Evelyne Myrick MD    Subjective   Subjective     Chief Complaint:  Extremity weakness    HPI:  Isidra Curry is a 23 y.o. female with a past medical history significant for asthma, anxiety/depression, and migraines who presents with complaints of bilateral lower extremity weakness since Monday. Patient states she awoke Monday morning with bruises on anterior thighs and " "associated bilateral LE numbness. States numbness then turned into pain and eventually paralysis. She is unable to move lower extremities and has completely lost sensation. Patient notes an incident today during which her father and  had to come help her out of the bathroom as she could no longer ambulate or get up from the commode. Prior to onset of lower extremity weakness, she notes mild viral like illness with nausea. States this lasted about 1-2 days. She was evaluated at two facilities PTA. Labs  and CT imaging unremarkable. Patient's family is frustrated as she has been told that \"this was all in head\". They are certain that this episode is not psychosomatic. Patient denies recent falls or trauma, but notes undergoing epidural 6 months ago during childbirth. No headache, visual changes, cough, congestion, fever, abdominal pain, or N/V/D. No changes in medications, recent vaccinations, or illicit substance abuse. Will admit for further evaluation and treatment.    Emergency Department Evaluation; vitals stable. Labs favorable. UA, HCG negative. MR brain and lumbar spine negative for acute process. LP findings unremarkable.    Review of Systems   Constitutional: Positive for fatigue. Negative for chills and fever.   HENT: Negative for congestion and trouble swallowing.    Eyes: Negative for photophobia and visual disturbance.   Respiratory: Negative for cough and shortness of breath.    Cardiovascular: Negative for chest pain and leg swelling.   Gastrointestinal: Negative for abdominal pain, diarrhea, nausea and vomiting.   Endocrine: Negative for cold intolerance and heat intolerance.   Genitourinary: Negative for flank pain.   Musculoskeletal: Positive for joint swelling.   Skin: Negative for pallor and rash.   Allergic/Immunologic: Negative for immunocompromised state.   Neurological: Positive for weakness and numbness. Negative for seizures, facial asymmetry and headaches.   Hematological: Negative " for adenopathy.   Psychiatric/Behavioral: Negative for agitation and confusion.          Otherwise 10-system ROS reviewed and is negative except as mentioned in the HPI.    Personal History     Past Medical History:   Diagnosis Date   • Anxiety    • Asthma    • Depression    • Migraine        Past Surgical History:   Procedure Laterality Date   • CHOLECYSTECTOMY  03/08/2016       Family History: family history includes Breast cancer in her paternal grandmother; Colon cancer in her maternal grandmother; Coronary artery disease in her father, maternal grandfather, maternal uncle, and paternal aunt; Diabetes in her father, maternal grandfather, maternal uncle, paternal aunt, and paternal grandfather; Hypertension in her father; Mental illness in her maternal uncle; Mental retardation in her paternal uncle; Migraines in her sister; Multiple sclerosis in her sister; Ovarian cancer in her maternal aunt; Seizures in her brother, father, and maternal uncle; Stroke in her maternal grandfather.     Social History:  reports that she has never smoked. She has never used smokeless tobacco. She reports that she does not drink alcohol or use drugs.  Social History     Social History Narrative   • No narrative on file       Medications:    (Not in a hospital admission)    No Known Allergies    Objective   Objective     Vital Signs:   Temp:  [98.8 °F (37.1 °C)] 98.8 °F (37.1 °C)  Heart Rate:  [104] 104  Resp:  [18] 18  BP: (125)/(80) 125/80        Physical Exam   Constitutional: No acute distress, awake, alert  Eyes: PERRLA, sclerae anicteric, no conjunctival injection  HENT: NCAT, mucous membranes moist  Neck: Supple, no thyromegaly, no lymphadenopathy, trachea midline  Respiratory: Clear to auscultation bilaterally, nonlabored respirations   Cardiovascular: RRR, no murmurs, rubs, or gallops, palpable pedal pulses bilaterally  Gastrointestinal: Positive bowel sounds, soft, nontender, nondistended  Musculoskeletal: No bilateral  ankle edema, no clubbing or cyanosis to extremities  Psychiatric: Appropriate affect, cooperative  Neurologic: Oriented x 3, lower extremities weak, without muscle tone. Secondary deficit. Pulses palpable Cranial Nerves grossly intact to confrontation, speech clear  Skin: No rashes      Results Reviewed:  I have personally reviewed current lab, radiology, and data and agree.      Results from last 7 days  Lab Units 07/13/18  0049   WBC 10*3/mm3 7.99   HEMOGLOBIN g/dL 13.3   HEMATOCRIT % 40.1   PLATELETS 10*3/mm3 305       Results from last 7 days  Lab Units 07/13/18  0049   SODIUM mmol/L 139   POTASSIUM mmol/L 3.8   CHLORIDE mmol/L 108   CO2 mmol/L 25.0   BUN mg/dL 8*   CREATININE mg/dL 0.82   GLUCOSE mg/dL 108*   CALCIUM mg/dL 8.3*   ALT (SGPT) U/L 13   AST (SGOT) U/L 13     Estimated Creatinine Clearance: 127.9 mL/min (by C-G formula based on SCr of 0.82 mg/dL).  Brief Urine Lab Results  (Last result in the past 365 days)      Color   Clarity   Blood   Leuk Est   Nitrite   Protein   CREAT   Urine HCG        07/13/18 0110               Negative         No results found for: BNP  Imaging Results (last 24 hours)     Procedure Component Value Units Date/Time    IR Lumbar Puncture Diagnosis [[id number]] Updated:  07/13/18 0332    MRI Brain With & Without Contrast [[id number]] Collected:  07/13/18 0014     Updated:  07/13/18 0326    Narrative:       EXAM:    MR Head Without And With Intravenous Contrast    CLINICAL HISTORY:    23 years old, female; Signs and symptoms; Other: HX of migraines; Patient HX:   Neuro deficits unable to feel and use bilateral lower extremities HX of   migraines 19 ml multihance administered; Additional info: Neuro deficit(s),   subacute. Neuro deficits unable to feel and use bilateral lower extremities HX   of migraines 19 ml multihance administered    TECHNIQUE:    Magnetic resonance images of the head/brain without and with intravenous   contrast in multiple planes.    CONTRAST:    19 mL of  Multihance administered intravenously.      COMPARISON:    No relevant prior studies available.    FINDINGS:    Brain parenchyma demonstrates normal signal intensity and enhancement without   an intra- or extra-axial mass or abnormality. No mass effect or midline shift.   No evidence of restricted diffusion in the supra-or infratentorial brain.      Midline brain structures including the corpus callosum, pituitary gland,   pineal region, and craniovertebral junction are unremarkable. Ventricles and   sulci are normal without evidence of hydrocephalus. Intracranial vessels   demonstrate a normal flow-void.    Impression:         Unremarkable study without a supra-, infratentorial mass or abnormality; no   evidence of hemorrhagic or ischemic infarct and no hydrocephalus.    THIS DOCUMENT HAS BEEN ELECTRONICALLY SIGNED BY MG NEAL MD    MRI Lumbar Spine Without Contrast [[id number]] Collected:  07/13/18 0002     Updated:  07/13/18 0323    Narrative:       EXAM:    MR Lumbar Spine Without Intravenous Contrast    CLINICAL HISTORY:    23 years old, female; Signs and symptoms; Other: Lbp; Patient HX: Lower back   pain unable to feel bilateral lower extremities; Additional info: Low back   pain, rapidly progressive neuro deficit. Lower back pain unable to feel   bilateral lower extremities    TECHNIQUE:    Magnetic resonance images of the lumbar spine without intravenous contrast in   multiple planes.    COMPARISON:    No relevant prior studies available.    FINDINGS:    Normal curvature of the spine, alignment of the vertebral bodies is normal.   Conus ends normally at the level of L1 with normal-appearing cauda equina   without evidence of compression.      Small S1 vertebral body hemangioma. Marrow shows normal signal intensity   without evidence of acute/subacute vertebral compression fracture or deformity.   No evidence of a pars defect or pars fracture.      From T11-T12 to L5-S1 intervertebral discs are normal, no  focal disc   herniation, no significant spinal stenosis or neural foramina narrowing.      Visualized sacrum, iliac bones and sacroiliac joints are unremarkable.   Pre-and paravertebral soft tissues are grossly normal. Visualized aorta is   unremarkable.    Impression:         Normal study, normal appearing cauda equina and conus without evidence of   compression or focal abnormality.    THIS DOCUMENT HAS BEEN ELECTRONICALLY SIGNED BY MG NEAL MD             Assessment/Plan   Assessment / Plan     Hospital Problem List     * (Principal)Lower extremity weakness    Chronic migraine without aura without status migrainosus, not intractable    Anxiety and depression    Asthma              Assessment & Plan:  1. Lower extremity weakness:  - Guillain Tichnor syndrome suggested, but patient with sensory loss ?. Psychosomatic?. Imaging, labs, LP negative.  - consult to neurology, appreciate input  - PT/OT treat and eval  - repeat labs as needed    2. Asthma:  - stable. PRN duo nebs with additional pulmonary toilet as needed      DVT prophylaxis: mechanical    CODE STATUS:  Full code, full support  Code Status and Medical Interventions:   Ordered at: 07/13/18 0418     Level Of Support Discussed With:    Patient     Code Status:    CPR     Medical Interventions (Level of Support Prior to Arrest):    Full       Admission Status:  I believe this patient meets OBSERVATION status, however if further evaluation or treatment plans warrant, status may change.  Based upon current information, I predict patient's care encounter to be less than or equal to 2 midnights.    Electronically signed by Bright Ch PA-C, 07/13/18, 4:18 AM.      Brief Attending Admission Attestation     I have seen and examined the patient, performing an independent face-to-face diagnostic evaluation with plan of care reviewed and developed with the advanced practice clinician (APC).      Brief Summary Statement/HPI:   Isidra Solano is a 23 y.o.  female , otherwise healthy or history of asthma, presents to [facility] with 4-5 days of progressively worsening lower extremity weakness to the point of paralysis, prior to this episode patient does endorse having bilateral bruising on her thighs, denies any trauma.  Patient does endorse having a viral syndrome week prior, otherwise denies any tick bites, denies any nausea or vomiting, no diarrhea.  On procedure to [facility] patient underwent MRI that was unremarkable, status post LP that was also unrevealing she has been admitted to hospitalist medicine service for continued workup.    Attending Physical Exam:  Constitutional: No acute distress, awake, alert  Eyes: PERRLA, sclerae anicteric, no conjunctival injection  HENT: NCAT, mucous membranes moist  Neck: Supple, no thyromegaly, no lymphadenopathy, trachea midline  Respiratory: Clear to auscultation bilaterally, nonlabored respirations   Cardiovascular: RRR, no murmurs, rubs, or gallops, palpable pedal pulses bilaterally  Gastrointestinal: Positive bowel sounds, soft, nontender, nondistended  Musculoskeletal: No bilateral ankle edema, no clubbing or cyanosis to extremities  Psychiatric: Appropriate affect, cooperative  Neurologic: Oriented x 3, no lower extremity weakness, 1/5, normal muscle tone, poor sensation  Skin: No rashes      Brief Assessment/Plan :  - Bilateral worsening bilateral lower extremity paralysis, etiology currently unknown, lower motor neuropathy likely? 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    See above for further detailed assessment and plan developed with APC which I have reviewed and/or edited.    Electronically signed by Sonia Perez MD, 07/13/18, 6:40 AM.             Electronically signed by Sonia Perez MD at 7/13/2018   "9:23 AM         [address] [address]  Dept. Phone:  [phone]  Dept. Fax:   Date Ordered: 2018         Patient:  Isidra Solano MRN:  [id number]   [address]   [facility] [address] :  1995  SSN:    Phone: [phone] Sex:  F     Weight: 101 kg (221 lb 9 oz)         Ht Readings from Last 1 Encounters:   18 168.9 cm (66.5\")         Standard Wheelchair              (Order ID: [id number])    Diagnosis:  Guillain Barré syndrome (CMS/HCC) (G61.0 [ICD-10-CM] 357.0 [ICD-9-CM])  Impaired mobility and ADLs (Z74.09 [ICD-10-CM] 799.89 [ICD-9-CM])   Quantity:  1     Equipment:  Standard Wheelchair  Wheelchair accessories:  Manual W/C Seat Widths 24-27 inches  The face to face evaluation was performed on: 2018  Length of Need (99 Months = Lifetime): 99 Months = Lifetime        Authorizing Provider's Phone: [phone]         Verbal Order Mode: Telephone with readback   Authorizing Provider: Sonia Perez MD  Authorizing Provider's NPI: [id number]     Order Entered By: Marilee Gottlieb 2018 10:28 AM     Electronically signed by:             "

## 2018-07-13 NOTE — PLAN OF CARE
Problem: Patient Care Overview  Goal: Plan of Care Review  Outcome: Ongoing (interventions implemented as appropriate)   07/13/18 0641   Coping/Psychosocial   Plan of Care Reviewed With patient;significant other   Patient Care Overview   Progress, Functional Goals other (see comments)   OTHER   Outcome Summary New admission for newly diagnosed guillain barre. SL in place. To have neuro consult today. Has a 6 month old baby she would like to bring to bedside. Paralysis from hips down along with no feeling.     Goal: Discharge Needs Assessment  Outcome: Ongoing (interventions implemented as appropriate)      Problem: Fall Risk (Adult)  Goal: Identify Related Risk Factors and Signs and Symptoms  Outcome: Ongoing (interventions implemented as appropriate)    Goal: Absence of Fall  Outcome: Ongoing (interventions implemented as appropriate)      Problem: Skin Injury Risk (Adult)  Goal: Identify Related Risk Factors and Signs and Symptoms  Outcome: Ongoing (interventions implemented as appropriate)    Goal: Skin Health and Integrity  Outcome: Ongoing (interventions implemented as appropriate)

## 2018-07-13 NOTE — PLAN OF CARE
Problem: Patient Care Overview  Goal: Plan of Care Review  Outcome: Ongoing (interventions implemented as appropriate)   07/13/18 0909   Coping/Psychosocial   Plan of Care Reviewed With patient   Patient Care Overview   IRF Plan of Care Review progress ongoing, continue   OTHER   Outcome Summary OT evaluation completed. Pt. demonstrating weakness LE's, with numbness and abnormal tone impacting ADL and transfer independence along with decreased ability to work, care for 6 month old or perform IADL task. Pt. will benefit from continued skilled OT intervention for education and progress with ADL and transfer adaption. Pt. may need rehab at discharge pending progress.

## 2018-07-13 NOTE — PLAN OF CARE
Problem: Patient Care Overview  Goal: Plan of Care Review  Outcome: Ongoing (interventions implemented as appropriate)   07/13/18 7445   Coping/Psychosocial   Plan of Care Reviewed With patient;spouse   OTHER   Outcome Summary PT evaluation completed. Pt demonstrates BLE weakness and tone with decreased indep re: functional mobility and evolving signs/symptoms, warranting further skilled PT services to promote PLOF. Limited today by weakness and c/o Bilat ankle pain in weightbearing but able to functionally perform STS and pivot to recliner. Recommend IP rehab placement based upon current function; TBA further pending progress.

## 2018-07-13 NOTE — H&P
"    [facility]  HISTORY AND PHYSICAL    Patient Name: Isidra Solano  : 1995  MRN: [id number]  Primary Care Physician: Evelyne Myrick MD    Subjective   Subjective     Chief Complaint:  Extremity weakness    HPI:  Isidra Solano is a 23 y.o. female with a past medical history significant for asthma, anxiety/depression, and migraines who presents with complaints of bilateral lower extremity weakness since Monday. Patient states she awoke Monday morning with bruises on anterior thighs and associated bilateral LE numbness. States numbness then turned into pain and eventually paralysis. She is unable to move lower extremities and has completely lost sensation. Patient notes an incident today during which her father and  had to come help her out of the bathroom as she could no longer ambulate or get up from the commode. Prior to onset of lower extremity weakness, she notes mild viral like illness with nausea. States this lasted about 1-2 days. She was evaluated at two facilities PTA. Labs  and CT imaging unremarkable. Patient's family is frustrated as she has been told that \"this was all in head\". They are certain that this episode is not psychosomatic. Patient denies recent falls or trauma, but notes undergoing epidural 6 months ago during childbirth. No headache, visual changes, cough, congestion, fever, abdominal pain, or N/V/D. No changes in medications, recent vaccinations, or illicit substance abuse. Will admit for further evaluation and treatment.    Emergency Department Evaluation; vitals stable. Labs favorable. UA, HCG negative. MR brain and lumbar spine negative for acute process. LP findings unremarkable.    Review of Systems   Constitutional: Positive for fatigue. Negative for chills and fever.   HENT: Negative for congestion and trouble swallowing.    Eyes: Negative for photophobia and visual disturbance.   Respiratory: Negative for cough and " shortness of breath.    Cardiovascular: Negative for chest pain and leg swelling.   Gastrointestinal: Negative for abdominal pain, diarrhea, nausea and vomiting.   Endocrine: Negative for cold intolerance and heat intolerance.   Genitourinary: Negative for flank pain.   Musculoskeletal: Positive for joint swelling.   Skin: Negative for pallor and rash.   Allergic/Immunologic: Negative for immunocompromised state.   Neurological: Positive for weakness and numbness. Negative for seizures, facial asymmetry and headaches.   Hematological: Negative for adenopathy.   Psychiatric/Behavioral: Negative for agitation and confusion.          Otherwise 10-system ROS reviewed and is negative except as mentioned in the HPI.    Personal History     Past Medical History:   Diagnosis Date   • Anxiety    • Asthma    • Depression    • Migraine        Past Surgical History:   Procedure Laterality Date   • CHOLECYSTECTOMY  03/08/2016       Family History: family history includes Breast cancer in her paternal grandmother; Colon cancer in her maternal grandmother; Coronary artery disease in her father, maternal grandfather, maternal uncle, and paternal aunt; Diabetes in her father, maternal grandfather, maternal uncle, paternal aunt, and paternal grandfather; Hypertension in her father; Mental illness in her maternal uncle; Mental retardation in her paternal uncle; Migraines in her sister; Multiple sclerosis in her sister; Ovarian cancer in her maternal aunt; Seizures in her brother, father, and maternal uncle; Stroke in her maternal grandfather.     Social History:  reports that she has never smoked. She has never used smokeless tobacco. She reports that she does not drink alcohol or use drugs.  Social History     Social History Narrative   • No narrative on file       Medications:    (Not in a hospital admission)    No Known Allergies    Objective   Objective     Vital Signs:   Temp:  [98.8 °F (37.1 °C)] 98.8 °F (37.1 °C)  Heart Rate:   [104] 104  Resp:  [18] 18  BP: (125)/(80) 125/80        Physical Exam   Constitutional: No acute distress, awake, alert  Eyes: PERRLA, sclerae anicteric, no conjunctival injection  HENT: NCAT, mucous membranes moist  Neck: Supple, no thyromegaly, no lymphadenopathy, trachea midline  Respiratory: Clear to auscultation bilaterally, nonlabored respirations   Cardiovascular: RRR, no murmurs, rubs, or gallops, palpable pedal pulses bilaterally  Gastrointestinal: Positive bowel sounds, soft, nontender, nondistended  Musculoskeletal: No bilateral ankle edema, no clubbing or cyanosis to extremities  Psychiatric: Appropriate affect, cooperative  Neurologic: Oriented x 3, lower extremities weak, without muscle tone. Secondary deficit. Pulses palpable Cranial Nerves grossly intact to confrontation, speech clear  Skin: No rashes      Results Reviewed:  I have personally reviewed current lab, radiology, and data and agree.      Results from last 7 days  Lab Units 07/13/18  0049   WBC 10*3/mm3 7.99   HEMOGLOBIN g/dL 13.3   HEMATOCRIT % 40.1   PLATELETS 10*3/mm3 305       Results from last 7 days  Lab Units 07/13/18  0049   SODIUM mmol/L 139   POTASSIUM mmol/L 3.8   CHLORIDE mmol/L 108   CO2 mmol/L 25.0   BUN mg/dL 8*   CREATININE mg/dL 0.82   GLUCOSE mg/dL 108*   CALCIUM mg/dL 8.3*   ALT (SGPT) U/L 13   AST (SGOT) U/L 13     Estimated Creatinine Clearance: 127.9 mL/min (by C-G formula based on SCr of 0.82 mg/dL).  Brief Urine Lab Results  (Last result in the past 365 days)      Color   Clarity   Blood   Leuk Est   Nitrite   Protein   CREAT   Urine HCG        07/13/18 0110               Negative         No results found for: BNP  Imaging Results (last 24 hours)     Procedure Component Value Units Date/Time    IR Lumbar Puncture Diagnosis [[id number]] Updated:  07/13/18 0332    MRI Brain With & Without Contrast [[id number]] Collected:  07/13/18 0014     Updated:  07/13/18 0326    Narrative:       EXAM:    MR Head Without And With  Intravenous Contrast    CLINICAL HISTORY:    23 years old, female; Signs and symptoms; Other: HX of migraines; Patient HX:   Neuro deficits unable to feel and use bilateral lower extremities HX of   migraines 19 ml multihance administered; Additional info: Neuro deficit(s),   subacute. Neuro deficits unable to feel and use bilateral lower extremities HX   of migraines 19 ml multihance administered    TECHNIQUE:    Magnetic resonance images of the head/brain without and with intravenous   contrast in multiple planes.    CONTRAST:    19 mL of Multihance administered intravenously.      COMPARISON:    No relevant prior studies available.    FINDINGS:    Brain parenchyma demonstrates normal signal intensity and enhancement without   an intra- or extra-axial mass or abnormality. No mass effect or midline shift.   No evidence of restricted diffusion in the supra-or infratentorial brain.      Midline brain structures including the corpus callosum, pituitary gland,   pineal region, and craniovertebral junction are unremarkable. Ventricles and   sulci are normal without evidence of hydrocephalus. Intracranial vessels   demonstrate a normal flow-void.    Impression:         Unremarkable study without a supra-, infratentorial mass or abnormality; no   evidence of hemorrhagic or ischemic infarct and no hydrocephalus.    THIS DOCUMENT HAS BEEN ELECTRONICALLY SIGNED BY MG NEAL MD    MRI Lumbar Spine Without Contrast [[id number]] Collected:  07/13/18 0002     Updated:  07/13/18 0323    Narrative:       EXAM:    MR Lumbar Spine Without Intravenous Contrast    CLINICAL HISTORY:    23 years old, female; Signs and symptoms; Other: Lbp; Patient HX: Lower back   pain unable to feel bilateral lower extremities; Additional info: Low back   pain, rapidly progressive neuro deficit. Lower back pain unable to feel   bilateral lower extremities    TECHNIQUE:    Magnetic resonance images of the lumbar spine without intravenous contrast in    multiple planes.    COMPARISON:    No relevant prior studies available.    FINDINGS:    Normal curvature of the spine, alignment of the vertebral bodies is normal.   Conus ends normally at the level of L1 with normal-appearing cauda equina   without evidence of compression.      Small S1 vertebral body hemangioma. Marrow shows normal signal intensity   without evidence of acute/subacute vertebral compression fracture or deformity.   No evidence of a pars defect or pars fracture.      From T11-T12 to L5-S1 intervertebral discs are normal, no focal disc   herniation, no significant spinal stenosis or neural foramina narrowing.      Visualized sacrum, iliac bones and sacroiliac joints are unremarkable.   Pre-and paravertebral soft tissues are grossly normal. Visualized aorta is   unremarkable.    Impression:         Normal study, normal appearing cauda equina and conus without evidence of   compression or focal abnormality.    THIS DOCUMENT HAS BEEN ELECTRONICALLY SIGNED BY MG NEAL MD             Assessment/Plan   Assessment / Plan     Hospital Problem List     * (Principal)Lower extremity weakness    Chronic migraine without aura without status migrainosus, not intractable    Anxiety and depression    Asthma              Assessment & Plan:  1. Lower extremity weakness:  - Guillain Jordan syndrome suggested, but patient with sensory loss ?. Psychosomatic?. Imaging, labs, LP negative.  - consult to neurology, appreciate input  - PT/OT treat and eval  - repeat labs as needed    2. Asthma:  - stable. PRN duo nebs with additional pulmonary toilet as needed      DVT prophylaxis: mechanical    CODE STATUS:  Full code, full support  Code Status and Medical Interventions:   Ordered at: 07/13/18 0418     Level Of Support Discussed With:    Patient     Code Status:    CPR     Medical Interventions (Level of Support Prior to Arrest):    Full       Admission Status:  I believe this patient meets OBSERVATION status, however  if further evaluation or treatment plans warrant, status may change.  Based upon current information, I predict patient's care encounter to be less than or equal to 2 midnights.    Electronically signed by Bright Ch PA-C, 07/13/18, 4:18 AM.      Brief Attending Admission Attestation     I have seen and examined the patient, performing an independent face-to-face diagnostic evaluation with plan of care reviewed and developed with the advanced practice clinician (APC).      Brief Summary Statement/HPI:   Isidra Solano is a 23 y.o. female , otherwise healthy or history of asthma, presents to [facility] with 4-5 days of progressively worsening lower extremity weakness to the point of paralysis, prior to this episode patient does endorse having bilateral bruising on her thighs, denies any trauma.  Patient does endorse having a viral syndrome week prior, otherwise denies any tick bites, denies any nausea or vomiting, no diarrhea.  On procedure to [facility] patient underwent MRI that was unremarkable, status post LP that was also unrevealing she has been admitted to hospitalist medicine service for continued workup.    Attending Physical Exam:  Constitutional: No acute distress, awake, alert  Eyes: PERRLA, sclerae anicteric, no conjunctival injection  HENT: NCAT, mucous membranes moist  Neck: Supple, no thyromegaly, no lymphadenopathy, trachea midline  Respiratory: Clear to auscultation bilaterally, nonlabored respirations   Cardiovascular: RRR, no murmurs, rubs, or gallops, palpable pedal pulses bilaterally  Gastrointestinal: Positive bowel sounds, soft, nontender, nondistended  Musculoskeletal: No bilateral ankle edema, no clubbing or cyanosis to extremities  Psychiatric: Appropriate affect, cooperative  Neurologic: Oriented x 3, no lower extremity weakness, 1/5, normal muscle tone, poor sensation  Skin: No rashes      Brief Assessment/Plan :  - Bilateral worsening bilateral lower extremity paralysis, etiology currently  unknown, lower motor neuropathy likely? 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    See above for further detailed assessment and plan developed with APC which I have reviewed and/or edited.    Electronically signed by Sonia Perez MD, 07/13/18, 6:40 AM.

## 2018-07-13 NOTE — ED PROVIDER NOTES
Subjective   History of Present Illness  This 23-year-old female presents to the emergency department with complaints of weakness in her lower extremities.  The patient's symptoms began approximately 4 days ago when she noted several bruises on her anterior upper thighs.  The patient also noted at that time that she had a numb sensation in her lower extremities.  The patient states that the numb sensation has gradually worked its way up and is now present as high as her hips.  She states that her lower extremities have been painful and she is seen further bruising.  Today the patient states that after utilizing the bathroom she actually required the assistance of her brother and her father in order to walk and keep from falling.  She states that her feet do not appear to work properly and that she feels quite weak.  The patient states that she had a bit of a gastroenteric type illness characterized by nausea and vomiting U the onset of this illness.  The patient was asked he seen at an outlying facility where laboratory evaluation apparently revealed evidence for urinary tract infection and CT scanning of the back Y's reportedly unremarkable.  The patient was subsequently discharged and presents to our facility with concerns that there is something else going on.  The patient denies trauma or injury.  She denies loss of bowel or bladder control she denies dysuria urgency or frequency she denies fever or chills.    Past medical history is significant for history of asthma    Current medications are as noted on the chart    Social history she does not drink smoke or utilize drugs  Review of Systems   Constitutional: Negative for chills and fever.   Respiratory: Negative for shortness of breath.    Cardiovascular: Negative for chest pain, palpitations and leg swelling.   Gastrointestinal: Positive for nausea and vomiting. Negative for abdominal pain and diarrhea.   Genitourinary: Negative for decreased urine volume,  dysuria, frequency and urgency.   Neurological: Positive for weakness and numbness.   All other systems reviewed and are negative.      Past Medical History:   Diagnosis Date   • Anxiety    • Asthma    • Depression    • Migraine        No Known Allergies    Past Surgical History:   Procedure Laterality Date   • CHOLECYSTECTOMY  03/08/2016       Family History   Problem Relation Age of Onset   • Coronary artery disease Father    • Hypertension Father    • Seizures Father    • Diabetes Father    • Diabetes Paternal Grandfather    • Breast cancer Paternal Grandmother    • Colon cancer Maternal Grandmother    • Ovarian cancer Maternal Aunt    • Multiple sclerosis Sister    • Migraines Sister    • Seizures Brother    • Coronary artery disease Maternal Uncle    • Seizures Maternal Uncle    • Diabetes Maternal Uncle    • Mental illness Maternal Uncle    • Coronary artery disease Paternal Aunt    • Diabetes Paternal Aunt    • Mental retardation Paternal Uncle    • Coronary artery disease Maternal Grandfather    • Stroke Maternal Grandfather    • Diabetes Maternal Grandfather        Social History     Social History   • Marital status:      Social History Main Topics   • Smoking status: Never Smoker   • Smokeless tobacco: Never Used   • Alcohol use No   • Drug use: No   • Sexual activity: Yes     Partners: Male     Other Topics Concern   • Not on file           Objective   Physical Exam   Constitutional: She is oriented to person, place, and time. She appears well-developed and well-nourished. No distress.   HENT:   Head: Normocephalic and atraumatic.   Mouth/Throat: No oropharyngeal exudate.   Eyes: Pupils are equal, round, and reactive to light. No scleral icterus.   Neck: Normal range of motion.   Cardiovascular: Normal rate, regular rhythm and normal heart sounds.    Pulmonary/Chest: Effort normal. No respiratory distress.   Abdominal: Soft. Bowel sounds are normal. She exhibits no distension. There is no  tenderness. There is no rebound and no guarding.   Musculoskeletal: She exhibits no edema.   Lymphadenopathy:     She has no cervical adenopathy.   Neurological: She is alert and oriented to person, place, and time. She displays abnormal reflex. A sensory deficit is present. No cranial nerve deficit. She exhibits abnormal muscle tone. Coordination normal.   Skin: Skin is warm and dry. No rash noted. She is not diaphoretic.   Psychiatric: She has a normal mood and affect. Her behavior is normal.   Nursing note and vitals reviewed.    On neurologic examination the patient states that she does not feel things normally in her lower extremities and that the sensation of abnormal bleeding extends up to the level of her hips.  Above this level her sensation seems to normalize.  She has depressed reflexes in the patellar and ankle jerk I am barely able to obtain any movement with this.  She is unable to press downward with her toes or pull them up word that in spite of trying quite hard she is unable to move her feet.  Her muscles are soft and lower extremity and without a significant amount of tone.  She is able to hold her legs up against gravity with her thigh muscles though only briefly with quite a bit of drift.  Toes are equivocal and not downgoing she states that though she is normally very very ticklish and she cannot really feel me performing the plantar reflex at all.  She denies saddle anesthesia at this time.  Looking at her lower legs she does have some bruising across the anterior portion of her thighs bilaterally she says she has not been falling but this does have more the appearance of bruising from actually bumping into something regardless her overall clinical presentation is very worrisome for an ascending polyneuropathy such as Guillain-Barré and she will require MRI imaging of her low back as well as a lumbar puncture baseline laboratories have been ordered and we will see if we can obtain records from  her previous hospital visit to supplement our evaluation.  Additionally her father tells me that there is a very strong family history of multiple sclerosis while this seems to be a process that is timewise as well as distribution wise somewhat unusual for him as is certainly a possibility to be considered.    Physical 1 diagnostic possibility was Guillain-Barré was felt appropriate and in comparative to perform a lumbar puncture.  I spoke with the patient and family members regarding this and they are in agreement.  Signed informed consent is obtained in order to perform this under fluoroscopic guidance.  The patient is taken to fluoroscopy suite following signed informed consent and is properly identified along with the procedure.  The patient is prepped and draped in usual aseptic fashion.  Under fluoroscopy the L4 5 space was identified and entered easily in a single pass with a 20-gauge spinal needle.  Approximately a cc of crystal clear colorless fluid is obtained and submitted for routine studies.  Patient tolerated procedure well and returned the emergency department following procedure to await results.  No immediate complications are identified.  Procedures           ED Course                  MDM      Final diagnoses:   Guillain Barré syndrome (CMS/HCC)            Juan Pablo Suarez MD  07/13/18 0748

## 2018-07-13 NOTE — THERAPY EVALUATION
Acute Care - Occupational Therapy Initial Evaluation  [facility]     Patient Name: Isidra Solano  : 1995  MRN: [id number]  Today's Date: 2018  Onset of Illness/Injury or Date of Surgery: 18  Date of Referral to OT: 18  Referring Physician: ANNA Ch    Admit Date: 2018       ICD-10-CM ICD-9-CM   1. 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 Impaired mobility and ADLs Z74.09 799.89     Patient Active Problem List   Diagnosis   • Chronic migraine without aura without status migrainosus, not intractable   • Chronic daily headache   • Pregnancy   • Postpartum care following vaginal delivery   • Anxiety and depression   • Asthma   • Lower extremity weakness   • Guillain Barré syndrome (CMS/HCC)     Past Medical History:   Diagnosis Date   • Anxiety    • Asthma    • Depression    • Migraine      Past Surgical History:   Procedure Laterality Date   • CHOLECYSTECTOMY  2016          OT ASSESSMENT FLOWSHEET (last 72 hours)      Occupational Therapy Evaluation     Row Name 18 0908                   OT Evaluation Time/Intention    Subjective Information complains of;fatigue;pain  -STANFORD        Document Type evaluation  -STANFORD        Mode of Treatment individual therapy;occupational therapy  -STANFORD        Patient Effort good  -STANFORD        Symptoms Noted During/After Treatment none  -STANFORD           General Information    Patient Profile Reviewed? yes  -STANFORD        Onset of Illness/Injury or Date of Surgery 18  -STANFORD        Referring Physician ANNA Ch  -STANFORD        Patient Observations alert;cooperative;agree to therapy  -STANFORD        Patient/Family Observations  present, but prone on couch asleep.  -STANFORD        General Observations of Patient Pt. supine in bed sleeping, but easily aroused.    -STANFORD        Prior Level of Function independent:;all household mobility;community mobility;ADL's;home management;shopping;driving;work   pt.  at [employer]  -STANFORD        Equipment Currently  Used at Home none  -STANFORD        Pertinent History of Current Functional Problem Pt. started with bruising on top of her thighs then progressed with tingling in feet and now unable to mobilize and no sensation up to top of legs.  MRI head and spine negative.  Other test pending.  -STANFORD        Existing Precautions/Restrictions fall   if tries to stand  -STANFORD        Risks Reviewed patient:;LOB;increased discomfort  -STANFORD        Benefits Reviewed patient:;improve function;increase independence;increase strength;increase balance;increase knowledge  -STANFORD           Relationship/Environment    Primary Source of Support/Comfort spouse;parent;child(lola)  -STANFORD        Lives With child(lola), dependent;spouse;parent(s)  -STANFORD           Resource/Environmental Concerns    Current Living Arrangements home/apartment/condo  -STANFORD           Home Main Entrance    Number of Stairs, Main Entrance one  -STANFORD           Cognitive Assessment/Interventions    Additional Documentation Cognitive Assessment/Intervention (Group)  -STANFORD           Cognitive Assessment/Intervention- PT/OT    Orientation Status (Cognition) oriented x 4  -STANFORD        Follows Commands (Cognition) WFL  -STANFORD        Cognitive Function (Cognitive) WFL  -STANFORD        Safety Deficit (Cognitive) mild deficit;safety precautions follow-through/compliance;insight into deficits/self awareness;awareness of need for assistance  -STANFORD        Personal Safety Interventions gait belt;nonskid shoes/slippers when out of bed;fall prevention program maintained  -STANFORD           Safety Issues, Functional Mobility    Safety Issues Affecting Function (Mobility) insight into deficits/self awareness  -STANFORD        Impairments Affecting Function (Mobility) balance;pain;muscle tone abnormal;range of motion (ROM);sensation/sensory awareness;strength  -STANFORD           Bed Mobility Assessment/Treatment    Bed Mobility Assessment/Treatment supine-sit;sit-supine;scooting/bridging;rolling left  -STANFORD        Rolling Left [place] (Bed  Mobility) minimum assist (75% patient effort);verbal cues  -STANFORD        Scooting/Bridging [place] (Bed Mobility) supervision;moderate assist (50% patient effort)  -STANFORD        Supine-Sit [place] (Bed Mobility) moderate assist (50% patient effort);verbal cues;nonverbal cues (demo/gesture)  -STANFORD        Sit-Supine [place] (Bed Mobility) verbal cues;nonverbal cues (demo/gesture);moderate assist (50% patient effort)  -STANFORD        Bed Mobility, Safety Issues decreased use of legs for bridging/pushing  -STANFORD        Assistive Device (Bed Mobility) bed rails  -STANFORD        Comment (Bed Mobility) Pt. was able to roll left with assist for LLE.  Pt. post legs to EOB able to sit trunk up and scoot to edge.  Pt. with legs lifted up was able to scoot self into and back to HOB.  -STANFORD           Functional Mobility    Functional Mobility- Ind. Level not appropriate to assess  -STANFORD           Transfer Assessment/Treatment    Comment (Transfers) NT  -STANFORD           BADL Safety/Performance    Impairments, BADL Safety/Performance strength;range of motion;balance  -STANFORD           General ROM    GENERAL ROM COMMENTS BUE AROM intact.  Pt. PROM/AAROM BLE's.  See PT not for more detail.  Pt. with abnormal tone and only with slight mvmt noted LE's    -STANFORD           General Assessment (Manual Muscle Testing)    General Manual Muscle Testing (MMT) Assessment lower extremity strength deficits identified   see PT KIT PETERS 5/5  -STANFORD           Motor Assessment/Interventions    Additional Documentation Balance (Group);Balance Interventions (Group);Therapeutic Exercise (Group);Therapeutic Exercise Interventions (Group)  -STANFORD           Therapeutic Exercise    47949 - OT Therapeutic Exercise Minutes 5  -STANFORD        80040 - OT Therapeutic Activity Minutes 5  -STANFORD           Therapeutic Exercise    Lower Extremity (Therapeutic Exercise) heel slides, bilateral  -STANFORD        Lower Extremity Range of Motion (Therapeutic Exercise) hip flexion/extension, bilateral;hip  abduction/adduction, bilateral;knee flexion/extension, bilateral;ankle dorsiflexion/plantar flexion, bilateral  -STANFORD        Exercise Type (Therapeutic Exercise) static stretching;PROM (passive range of motion)  -STANFORD        Position (Therapeutic Exercise) supine  -STANFORD        Sets/Reps (Therapeutic Exercise) 1/5-10  -STANFORD        Expected Outcome (Therapeutic Exercise) increase passive range of motion   decrease pain  -STANFORD           Balance    Balance static sitting balance;dynamic sitting balance  -STANFORD           Static Sitting Balance    Level of Centre (Unsupported Sitting, Static Balance) supervision  -STANFORD        Sitting Position (Unsupported Sitting, Static Balance) sitting on edge of bed  -STANFORD        Time Able to Maintain Position (Unsupported Sitting, Static Balance) 1 to 2 minutes  -STANFORD           Dynamic Sitting Balance    Level of Centre, Reaches Outside Midline (Sitting, Dynamic Balance) supervision   pt. tolerated mod resistance to trunk  -STANFORD           Sensory Assessment/Intervention    Sensory General Assessment light touch sensation deficits identified  -STANFORD           Light Touch Sensation Assessment    Left Upper Extremity: Light Touch Sensation Assessment intact  -STANFORD        Right Upper Extremity: Light Touch Sensation Assessment intact  -STANFORD        Left Lower Extremity: Light Touch Sensation Assessment absent sensation  -STANFORD        Right Lower Extremity: Light Touch Sensation Assessment absent sensation  -STANFORD           Positioning and Restraints    Pre-Treatment Position in bed  -STANFORD        Post Treatment Position bed  -STANFORD        In Bed sitting;call light within reach;encouraged to call for assist;with family/caregiver  -STANFORD           Pain Assessment    Additional Documentation Pain Scale: Numbers Pre/Post-Treatment (Group)  -STANFORD           Pain Scale: Numbers Pre/Post-Treatment    Pain Scale: Numbers, Pretreatment 7/10  -STANFORD        Pain Scale: Numbers, Post-Treatment 7/10  -STANFORD        Pain Location - Side  Bilateral  -STANFORD        Pain Location - Orientation lower  -STANFORD        Pain Location extremity  -STANFORD        Pain Intervention(s) Repositioned;Ambulation/increased activity   issued legl , ROM  -STANFORD           Wound 07/13/18 0638 Left upper thigh abrasion    Wound - Properties Group Date first assessed: 07/13/18  -KR Time first assessed: 0638  -KR Present On Admission : yes  -KR Side: Left  -KR Orientation: upper  -KR Location: thigh  -KR Type: abrasion  -KR       Plan of Care Review    Plan of Care Reviewed With patient   spouse sleeping  -STANFORD           Clinical Impression (OT)    Date of Referral to OT 07/13/18  -STANFORD        OT Diagnosis Impaired ADL, IADL and transfer independence due to LE weakness, numbness and abnormal tone  -STANFORD        Patient/Family Goals Statement (OT Eval) Pt. wants to find reason for deficits. Return PLOF  -STANFORD        Criteria for Skilled Therapeutic Interventions Met (OT Eval) yes;treatment indicated  -STANFORD        Rehab Potential (OT Eval) fair, will monitor progress closely   pending prognosis.  -STANFORD        Therapy Frequency (OT Eval) daily  -STANFORD        Care Plan Review (OT) evaluation/treatment results reviewed;care plan/treatment goals reviewed;risks/benefits reviewed;current/potential barriers reviewed;patient/other agree to care plan  -STANFORD        Anticipated Equipment Needs at Discharge (OT) dressing equipment;hospital bed;transfer board;tub bench;wheelchair   possible toilet AD, all pending progress  -STANFORD        Anticipated Discharge Disposition (OT) inpatient rehabilitation facility  -STANFORD           Vital Signs    Pre Systolic BP Rehab --   Nurse OK treatment, no telemetry  -STANFORD        Pre Patient Position Supine  -STANFORD        Intra Patient Position Sitting  -STANFORD        Post Patient Position Supine  -STANFORD           Planned OT Interventions    Planned Therapy Interventions (OT Eval) adaptive equipment training;activity tolerance training;BADL retraining;functional balance retraining;IADL  retraining;occupation/activity based interventions;ROM/therapeutic exercise;strengthening exercise;transfer/mobility retraining  -STANFORD           OT Goals    Bed Mobility Goal Selection (OT) bed mobility, OT goal 1  -STANFORD        Transfer Goal Selection (OT) transfer, OT goal 1  -STANFORD        Dressing Goal Selection (OT) dressing, OT goal 1  -STANFORD        Toileting Goal Selection (OT) toileting, OT goal 1  -STANFORD           Bed Mobility Goal 1 (OT)    Activity/Assistive Device (Bed Mobility Goal 1, OT) bed mobility activities, all;bed rails;leg   -STANFORD        [place] Level/Cues Needed (Bed Mobility Goal 1, OT) verbal cues required;supervision required;tactile cues required  -STANFORD        Time Frame (Bed Mobility Goal 1, OT) long term goal (LTG);10 days  -STANFORD        Progress/Outcomes (Bed Mobility Goal 1, OT) goal ongoing  -STANFORD           Transfer Goal 1 (OT)    Activity/Assistive Device (Transfer Goal 1, OT) toilet;bed-to-chair/chair-to-bed;commode, bedside with drop arms   slide board  -STANFORD        [place] Level/Cues Needed (Transfer Goal 1, OT) minimum assist (75% or more patient effort)  -STANFORD        Time Frame (Transfer Goal 1, OT) long term goal (LTG);10 days  -STANFORD        Progress/Outcome (Transfer Goal 1, OT) goal ongoing  -STANFORD           Dressing Goal 1 (OT)    Activity/Assistive Device (Dressing Goal 1, OT) lower body dressing;reacher;sock-aid;long handled shoe horn   AE prn  -STANFORD        [place]/Cues Needed (Dressing Goal 1, OT) minimum assist (75% or more patient effort);verbal cues required;tactile cues required  -STANFORD        Time Frame (Dressing Goal 1, OT) long term goal (LTG);10 days  -STANFORD        Progress/Outcome (Dressing Goal 1, OT) goal ongoing  -STANFORD           Toileting Goal 1 (OT)    Activity/Device (Toileting Goal 1, OT) toileting skills, all;commode, bedside with drop arms;commode, bedside without drop arms  -STANFORD        [place] Level/Cues Needed (Toileting Goal 1, OT) tactile cues required;verbal cues  required;minimum assist (75% or more patient effort)  -STANFORD        Time Frame (Toileting Goal 1, OT) long term goal (LTG);10 days  -STANFORD        Progress/Outcome (Toileting Goal 1, OT) goal ongoing  -STANFORD           Living Environment    Home Accessibility stairs to enter home;tub/shower is not walk in  -STANFORD          User Key  (r) = Recorded By, (t) = Taken By, (c) = Cosigned By    Initials Name Effective Dates    STANFORD Beatriz Paris OT 06/08/18 -     UBALDO Soares RN 06/16/16 -            Occupational Therapy Education     Title: PT OT SLP Therapies (Active)     Topic: Occupational Therapy (Active)     Point: ADL training (Done)     Description: Instruct learner(s) on proper safety adaptation and remediation techniques during self care or transfers.   Instruct in proper use of assistive devices.   Learning Progress Summary     Learner Status Readiness Method Response Comment Documented by    Patient Done Acceptance VETO ESPOSITO,NR role OT, reason for consult, noted deficits, leg  use, bed mobility,goal setting.  07/13/18 0954          Point: Home exercise program (Done)     Description: Instruct learner(s) on appropriate technique for monitoring, assisting and/or progressing therapeutic exercises/activities.   Learning Progress Summary     Learner Status Readiness Method Response Comment Documented by    Patient Done Acceptance VETO ESPOSITO,NR role OT, reason for consult, noted deficits, leg  use, bed mobility,goal setting.  07/13/18 0954          Point: Precautions (Done)     Description: Instruct learner(s) on prescribed precautions during self-care and functional transfers.   Learning Progress Summary     Learner Status Readiness Method Response Comment Documented by    Patient Done Acceptance VETO ESPOSITO,NR role OT, reason for consult, noted deficits, leg  use, bed mobility,goal setting.  07/13/18 0954                      User Key     Initials Effective Dates Name Provider Type Discipline     06/08/18 -   Beatriz Paris OT Occupational Therapist OT                  OT Recommendation and Plan  Outcome Summary/Treatment Plan (OT)  Anticipated Equipment Needs at Discharge (OT): dressing equipment, hospital bed, transfer board, tub bench, wheelchair (possible toilet AD, all pending progress)  Anticipated Discharge Disposition (OT): inpatient rehabilitation facility  Planned Therapy Interventions (OT Eval): adaptive equipment training, activity tolerance training, BADL retraining, functional balance retraining, IADL retraining, occupation/activity based interventions, ROM/therapeutic exercise, strengthening exercise, transfer/mobility retraining  Therapy Frequency (OT Eval): daily  Plan of Care Review  Plan of Care Reviewed With: patient  Plan of Care Reviewed With: patient          Outcome Measures     Row Name 07/13/18 0908             How much help from another is currently needed...    Putting on and taking off regular lower body clothing? 2  -STANFORD      Bathing (including washing, rinsing, and drying) 2  -STANFORD      Toileting (which includes using toilet bed pan or urinal) 2  -STANFORD      Putting on and taking off regular upper body clothing 3  -STANFORD      Taking care of personal grooming (such as brushing teeth) 4  -STANFORD      Eating meals 4  -STANFORD      Score 17  -STANFORD         Functional Assessment    Outcome Measure Options AM-PAC 6 Clicks Daily Activity (OT)  -STANFORD        User Key  (r) = Recorded By, (t) = Taken By, (c) = Cosigned By    Initials Name Provider Type    STANFORD Paris OT Occupational Therapist          Time Calculation:   OT Start Time: 0908  Therapy Suggested Charges     Code   Minutes Charges    97411 (CPT®) Hc Ot Neuromusc Re Education Ea 15 Min      98385 (CPT®) Hc Ot Ther Proc Ea 15 Min 5 1    47506 (CPT®) Hc Ot Therapeutic Act Ea 15 Min 5     06688 (CPT®) Hc Ot Manual Therapy Ea 15 Min      88739 (CPT®) Hc Ot Iontophoresis Ea 15 Min      37934 (CPT®) Hc Ot Elec Stim Ea-Per 15 Min      19953 (CPT®) Hc Ot  Ultrasound Ea 15 Min      22143 (CPT®) Hc Ot Self Care/Mgmt/Train Ea 15 Min      Total  10 1        Therapy Charges for Today     Code Description Service Date Service Provider Modifiers Qty    55822541953 HC OT THER PROC EA 15 MIN 7/13/2018 Beatriz Paris, OT GO 1    97125543371 HC OT EVAL LOW COMPLEXITY 3 7/13/2018 Beatriz Paris, OT GO 1    24143888843 HC OT THER SUPP EA 15 MIN 7/13/2018 Beatriz Paris OT GO 1               Beatriz Paris OT  7/13/2018

## 2018-07-13 NOTE — CONSULTS
Neurology    Referring Provider: Kashmir Ch PA-C    Reason for Consultation: BLE weakness      Chief complaint: BLE weakness    Subjective .     History of present illness:  Mrs. Solano is a pleasant 23 year old female with history of anxiety, depression, and migraines, who is admitted to the hospitalist service for bilateral leg weakness. She reports her symptoms began this past Monday, 7/9/2018 when she noted pain in the proximal lower extremities and bruising over the anterior thighs. She denies any trauma, falls, or physical abuse. Symptoms later progressed to include numbness of the lower extremities and progressive weakness. On the day prior to presentation she was unable to ambulate due to weakness. She presented to 2 different OSH ([place] and [facility]) who discharged her from the ED. On arrival here, she had an LP under fluoro with unremarkable CSF analysis. She reports having some intermittent nausea prior to onset of her symptoms but denies any diarrhea or URI symptoms.    Review of Systems: positive for weakness. Otherwise complete ROS was discussed with the patient and found to be negative except for that mentioned in the HPI    History  Past Medical History:   Diagnosis Date   • Anxiety    • Asthma    • Depression    • Migraine    ,   Past Surgical History:   Procedure Laterality Date   • CHOLECYSTECTOMY  03/08/2016   ,   Family History   Problem Relation Age of Onset   • Coronary artery disease Father    • Hypertension Father    • Seizures Father    • Diabetes Father    • Diabetes Paternal Grandfather    • Breast cancer Paternal Grandmother    • Colon cancer Maternal Grandmother    • Ovarian cancer Maternal Aunt    • Multiple sclerosis Sister    • Migraines Sister    • Seizures Brother    • Coronary artery disease Maternal Uncle    • Seizures Maternal Uncle    • Diabetes Maternal Uncle    • Mental illness Maternal Uncle    • Coronary artery disease Paternal Aunt    • Diabetes Paternal Aunt    •  "Mental retardation Paternal Uncle    • Coronary artery disease Maternal Grandfather    • Stroke Maternal Grandfather    • Diabetes Maternal Grandfather    ,   Social History   Substance Use Topics   • Smoking status: Never Smoker   • Smokeless tobacco: Never Used   • Alcohol use No   ,   Prescriptions Prior to Admission   Medication Sig Dispense Refill Last Dose   • albuterol (PROVENTIL) (2.5 MG/3ML) 0.083% nebulizer solution Take 2.5 mg by nebulization Every 4 (Four) Hours As Needed for Wheezing.   Taking   • norgestimate-ethinyl estradiol (TRI-PREVIFEM) 0.18/0.215/0.25 MG-35 MCG per tablet Take 1 tablet by mouth Daily.   Taking   • amitriptyline (ELAVIL) 10 MG tablet Take 1-2 tablets by mouth Every Night. 60 tablet 5    • magnesium oxide (MAGOX) 400 (241.3 Mg) MG tablet tablet Take 1 tablet by mouth Every Night. 30 each 11    • SUMAtriptan (IMITREX) 50 MG tablet Take one tablet at onset of headache. May repeat dose one time in 2 hours if headache not relieved. 9 tablet 5     and Allergies:  Patient has no known allergies.    Objective     Vital Signs   Blood pressure 113/65, pulse 88, temperature 98.3 °F (36.8 °C), temperature source Oral, resp. rate 18, height 168.9 cm (66.5\"), weight 101 kg (221 lb 9 oz), SpO2 99 %, not currently breastfeeding.    Physical Exam:      Gen: sitting in bedside chair with eyes open. In NAD. Appears stated age   Eyes: PERRL, conjuntivae/lids normal   ENT: External canals normal bilaterally. Dentition normal   Neck: Supple. No thyroid enlargement noted   Respiratory: CTA bilaterally. Respirations unlabored   CV: RRR, S1 and S2 nml. Radial pulses 2+ bilaterally.    Skin: Bruises noted over anterior thighs, no erythema seen. Normal tugor.   MSK: Normal bulk and tone. Nml ROM     Neurologic:   Mental status: Awake, alert, oriented x4. Follows commands. Speech fluent.    CN: PERRL, EOM intact, sensation intact in upper/mid/lower face bilaterally, facial movements symmetric, hearing intact " to finger rub bilaterally, palate elevates symmetrically, tongue movements and SCMs strong bilaterally    Motor: Full strength seen in bilateral upper extremities, no effort noted in either leg. Fong sign positive bilaterally.    Reflexes: 2+ and symmetric throughout in BUE and BLE. No clonus noted    Sensory: decreased LT BLE compared to BUE   Coordination: No dysmetria noted   Gait: Not tested        Results Reviewed:     Labs reviewed  MRI brain personally reviewed, no acute process noted  MRI L-spine report reviewed  EKG report reviewed                 Assessment/Plan     1.  Bilateral leg weakness = unclear etiology but differential includes myelopathic process, metabolic/autoimmune process, or possible conversion disorder. No acute intracranial process appreciated on MRI brain. No evidence of conus/cauda equina pathology on MRI L-spine. Reflexes intact, so unlikely to be Guillain-[place] syndrome. Patient complains of lack of sensation, so unlikely to be myopathy. Recommend checking MRI C/T-spines with and without contrast. Will check autoimmune panels, including TIMMY, ANCA, SPEP, and serum porphyrins. Rehab gregorio Leonard MD  07/13/18  2:46 PM

## 2018-07-13 NOTE — PROGRESS NOTES
Discharge Planning Assessment  [facility]     Patient Name: Isidra Solano  MRN: [id number]  Today's Date: 7/13/2018    Admit Date: 7/12/2018          Discharge Needs Assessment     Row Name 07/13/18 1049       Living Environment    Lives With spouse;child(lola), dependent;parent(s)    Current Living Arrangements home/apartment/condo    Primary Care Provided by spouse/significant other;parent(s)    Provides Primary Care For child(lola)    Family Caregiver if Needed spouse;parent(s)    Quality of Family Relationships supportive    Able to Return to Prior Arrangements yes       Resource/Environmental Concerns    Resource/Environmental Concerns none    Transportation Concerns car, none       Transition Planning    Patient/Family Anticipates Transition to home with family    Patient/Family Anticipated Services at Transition none       Discharge Needs Assessment    Concerns to be Addressed adjustment to diagnosis/illness    Equipment Currently Used at Home none   has a walker and cane can use    Equipment Needed After Discharge wheelchair, manual            Discharge Plan     Row Name 07/13/18 1057       Plan    Plan Home with family    Patient/Family in Agreement with Plan yes    Plan Comments SW spoke with pt at bedside. Pt had been independent in ADL's and IADL's prior to admission. Pt is newly diagnosed with Guillian Meno and reports she is unsbale ot feel her legs. SW discussed possible discharge eneds sucha s a wheelchair, possibly home health or PT/OT services. Pt reports she would like a wheelchair ordered and had no preference of a CamioCam. Pt declined home ehalth or any other services. Pt reports she has a walker and can as well she would be able to use at home if able to at some point. Pt reports she has a lot of family support, lives with her husbnad and parents and pt has two young kids. Pt reports she is able to get enough support and help at home with everything. SW ordered pt a wheelchair through  [employer]. Vesna with [employer] will bring a wheelchair to pt at bedside today.    Final Discharge Disposition Code 01 - home or self-care        Destination     No service coordination in this encounter.      Durable Medical Equipment     No service coordination in this encounter.      Dialysis/Infusion     No service coordination in this encounter.      Home Medical Care     No service coordination in this encounter.      Social Care     No service coordination in this encounter.                Demographic Summary     Row Name 07/13/18 1048       General Information    Reason for Consult discharge planning            Functional Status     Row Name 07/13/18 1048       Functional Status, IADL    Medications independent    Meal Preparation assistive equipment and person    Housekeeping assistive equipment and person    Laundry assistive equipment and person    Shopping assistive equipment and person            Psychosocial    No documentation.           Abuse/Neglect    No documentation.           Legal    No documentation.           Substance Abuse    No documentation.           Patient Forms    No documentation.         TRISTAN Johnson

## 2018-07-13 NOTE — THERAPY EVALUATION
Acute Care - Physical Therapy Initial Evaluation  [facility]     Patient Name: Isidra Solano  : 1995  MRN: [id number]  Today's Date: 2018   Onset of Illness/Injury or Date of Surgery: 18  Date of Referral to PT: 18  Referring Physician: ANNA Ch      Admit Date: 2018    Visit Dx:     ICD-10-CM ICD-9-CM   1. 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 Impaired mobility and ADLs Z74.09 799.89   3. Impaired functional mobility, balance, gait, and endurance Z74.09 V49.89     Patient Active Problem List   Diagnosis   • Chronic migraine without aura without status migrainosus, not intractable   • Chronic daily headache   • Pregnancy   • Postpartum care following vaginal delivery   • Anxiety and depression   • Asthma   • Lower extremity weakness   • Guillain Barré syndrome (CMS/HCC)     Past Medical History:   Diagnosis Date   • Anxiety    • Asthma    • Depression    • Migraine      Past Surgical History:   Procedure Laterality Date   • CHOLECYSTECTOMY  2016        PT ASSESSMENT (last 12 hours)      Physical Therapy Evaluation     Row Name 18 0950          PT Evaluation Time/Intention    Subjective Information complains of;fatigue;weakness;pain  -LS     Document Type evaluation  -LS     Mode of Treatment physical therapy  -LS     Patient Effort good  -LS     Symptoms Noted During/After Treatment increased pain  -LS     Row Name 18 0950          General Information    Patient Profile Reviewed? yes  -LS     Onset of Illness/Injury or Date of Surgery 18  -LS     Referring Physician ANNA Ch  -LS     Patient Observations alert;cooperative;agree to therapy  -LS     Patient/Family Observations  at bedside.   -LS     Prior Level of Function independent:;gait;transfer;ADL's;bathing;dressing  -LS     Equipment Currently Used at Home none  -LS     Pertinent History of Current Functional Problem To [facility] with 4-5 day hx of BLE bruising, numbness, and  progressive pain and paralysis. MRI unremarkable; LP also unrevealing at this time.   -LS     Existing Precautions/Restrictions fall  -LS     Risks Reviewed patient:;spouse/S.O.:;LOB;dizziness;increased discomfort;change in vital signs  -LS     Benefits Reviewed patient:;spouse/S.O.:;improve function;increase independence;increase strength;increase knowledge  -LS     Row Name 07/13/18 0950          Relationship/Environment    Lives With spouse;child(lola), dependent;parent(s);sibling(s)  -LS     Row Name 07/13/18 0950          Resource/Environmental Concerns    Current Living Arrangements home/apartment/condo  -     Row Name 07/13/18 0950          Home Main Entrance    Number of Stairs, Main Entrance two  -LS     Row Name 07/13/18 0950          Cognitive Assessment/Interventions    Additional Documentation Cognitive Assessment/Intervention (Group)  -     Row Name 07/13/18 0950          Cognitive Assessment/Intervention- PT/OT    Affect/Mental Status (Cognitive) WFL  -LS     Orientation Status (Cognition) oriented x 4  -LS     Follows Commands (Cognition) WFL  -LS     Safety Deficit (Cognitive) mild deficit;insight into deficits/self awareness;awareness of need for assistance;safety precautions awareness  -LS     Personal Safety Interventions gait belt;fall prevention program maintained;nonskid shoes/slippers when out of bed  -     Row Name 07/13/18 0950          Bed Mobility Assessment/Treatment    Supine-Sit Halbur (Bed Mobility) minimum assist (75% patient effort);verbal cues  -     Assistive Device (Bed Mobility) bed rails;head of bed elevated  -     Row Name 07/13/18 0950          Transfer Assessment/Treatment    Transfer Assessment/Treatment bed-chair transfer;sit-stand transfer;stand-sit transfer  -     Comment (Transfers) PT/ tech on either side of pt. Pt c/o bilat ankle pain during standing and req'd assist for advancing LEs. No noted knee buckling.   -LS     Bed-Chair Halbur  (Transfers) maximum assist (25% patient effort);2 person assist;verbal cues  -     Sit-Stand Will (Transfers) moderate assist (50% patient effort);2 person assist;verbal cues  -LS     Stand-Sit Will (Transfers) moderate assist (50% patient effort);2 person assist;verbal cues  -LS     Row Name 07/13/18 0950          Gait/Stairs Assessment/Training    Will Level (Gait) unable to assess  -LS     Row Name 07/13/18 0950          General ROM    GENERAL ROM COMMENTS PROM grossly WFL; AROM limited by weakness. Noted slight LE tone.   -LS     Row Name 07/13/18 0950          MMT (Manual Muscle Testing)    Additional Documentation General Assessment (Manual Muscle Testing) (Group)  -LS     Row Name 07/13/18 0950          General Assessment (Manual Muscle Testing)    General Manual Muscle Testing (MMT) Assessment lower extremity strength deficits identified  -LS     Row Name 07/13/18 0950          Lower Extremity (Manual Muscle Testing)    Comment, MMT: Lower Extremity 0/5 ankle/knee during supine MMT; hip flex 1/5. Functionally, pt able to perform STS mod x2 with no noted knee buckling.   -LS     Row Name 07/13/18 0950          Motor Assessment/Intervention    Additional Documentation Balance (Group);Therapeutic Exercise (Group)  -LS     Row Name 07/13/18 0950          Balance    Balance static sitting balance;static standing balance  -LS     Row Name 07/13/18 0950          Static Sitting Balance    Level of Will (Unsupported Sitting, Static Balance) supervision  -     Sitting Position (Unsupported Sitting, Static Balance) sitting on edge of bed  -LS     Row Name 07/13/18 0950          Static Standing Balance    Level of Will (Supported Standing, Static Balance) moderate assist, 50 to 74% patient effort  -LS     Row Name 07/13/18 0950          Sensory Assessment/Intervention    Sensory General Assessment light touch sensation deficits identified  -LS     Row Name 07/13/18 0940           Light Touch Sensation Assessment    Left Lower Extremity: Light Touch Sensation Assessment absent sensation  -LS     Right Lower Extremity: Light Touch Sensation Assessment absent sensation  -LS     Row Name 07/13/18 0950          Pain Scale: Numbers Pre/Post-Treatment    Pain Scale: Numbers, Pretreatment 0/10 - no pain  -LS     Pain Scale: Numbers, Post-Treatment 6/10  -LS     Pain Location - Side Bilateral  -LS     Pain Location ankle  -LS     Pain Intervention(s) Repositioned;Ambulation/increased activity  -LS     Row Name             Wound 07/13/18 0638 Left upper thigh abrasion    Wound - Properties Group Date first assessed: 07/13/18  -KR Time first assessed: 0638  -KR Present On Admission : yes  -KR Side: Left  -KR Orientation: upper  -KR Location: thigh  -KR Type: abrasion  -KR    Row Name 07/13/18 0950          Plan of Care Review    Plan of Care Reviewed With patient;spouse  -LS     Row Name 07/13/18 0950          Physical Therapy Clinical Impression    Date of Referral to PT 07/12/18  -LS     PT Diagnosis (PT Clinical Impression) impaired functional mobility, balance, gait  -LS     Patient/Family Goals Statement (PT Clinical Impression) return to PLOF; go home  -LS     Criteria for Skilled Interventions Met (PT Clinical Impression) yes;treatment indicated  -LS     Rehab Potential (PT Clinical Summary) good, to achieve stated therapy goals  -LS     Care Plan Review (PT) evaluation/treatment results reviewed  -LS     Care Plan Review, Other Participant (PT Clinical Impression) spouse  -LS     Row Name 07/13/18 0950          Vital Signs    Pre Systolic BP Rehab --   no tele present; RN gave consent for therapy on RA  -LS     Pre Patient Position Supine  -LS     Intra Patient Position Standing  -LS     Post Patient Position Sitting  -LS     Row Name 07/13/18 0913          Physical Therapy Goals    Bed Mobility Goal Selection (PT) bed mobility, PT goal 1  -LS     Transfer Goal Selection (PT) transfer, PT  goal 1  -LS     Gait Training Goal Selection (PT) gait training, PT goal 1  -LS     Row Name 07/13/18 0950          Bed Mobility Goal 1 (PT)    Activity/Assistive Device (Bed Mobility Goal 1, PT) sit to supine/supine to sit  -LS     Ness Level/Cues Needed (Bed Mobility Goal 1, PT) independent  -LS     Time Frame (Bed Mobility Goal 1, PT) 2 weeks  -LS     Progress/Outcomes (Bed Mobility Goal 1, PT) goal ongoing  -LS     Row Name 07/13/18 0950          Transfer Goal 1 (PT)    Activity/Assistive Device (Transfer Goal 1, PT) sit-to-stand/stand-to-sit;walker, rolling  -LS     Ness Level/Cues Needed (Transfer Goal 1, PT) minimum assist (75% or more patient effort)  -LS     Time Frame (Transfer Goal 1, PT) 2 weeks  -LS     Progress/Outcome (Transfer Goal 1, PT) goal ongoing  -     Row Name 07/13/18 0950          Gait Training Goal 1 (PT)    Activity/Assistive Device (Gait Training Goal 1, PT) gait (walking locomotion);walker, rolling  -LS     Ness Level (Gait Training Goal 1, PT) moderate assist (50-74% patient effort)  -LS     Distance (Gait Goal 1, PT) 10  -LS     Time Frame (Gait Training Goal 1, PT) 2 weeks  -LS     Progress/Outcome (Gait Training Goal 1, PT) goal ongoing  -     Row Name 07/13/18 0950          Positioning and Restraints    Pre-Treatment Position in bed  -LS     Post Treatment Position chair  -LS     In Chair notified nsg;reclined;call light within reach;encouraged to call for assist;exit alarm on;with family/caregiver;RUE elevated;LUE elevated;waffle cushion;on mechanical lift sling;heels elevated  -     Row Name 07/13/18 0950          Living Environment    Home Accessibility stairs to enter home;tub/shower is not walk in  -LS       User Key  (r) = Recorded By, (t) = Taken By, (c) = Cosigned By    Initials Name Provider Type    LS Zenaida Dai, PT Physical Therapist    UBALDO Soares, RN Registered Nurse          Physical Therapy Education     Title: PT OT SLP Therapies  (Active)     Topic: Physical Therapy (Active)     Point: Mobility training (Active)    Learning Progress Summary     Learner Status Readiness Method Response Comment Documented by    Patient Active Acceptance E,D NR  LS 07/13/18 1511    Significant Other Active Acceptance E,D NR  LS 07/13/18 1511          Point: Home exercise program (Active)    Learning Progress Summary     Learner Status Readiness Method Response Comment Documented by    Patient Active Acceptance E,D NR  LS 07/13/18 1511    Significant Other Active Acceptance E,D NR  LS 07/13/18 1511          Point: Body mechanics (Active)    Learning Progress Summary     Learner Status Readiness Method Response Comment Documented by    Patient Active Acceptance E,D NR  LS 07/13/18 1511    Significant Other Active Acceptance E,D NR  LS 07/13/18 1511          Point: Precautions (Active)    Learning Progress Summary     Learner Status Readiness Method Response Comment Documented by    Patient Active Acceptance E,D NR  LS 07/13/18 1511    Significant Other Active Acceptance E,D NR  LS 07/13/18 1511                      User Key     Initials Effective Dates Name Provider Type Discipline     06/19/15 -  Zenaida Dai, PT Physical Therapist PT                PT Recommendation and Plan  Anticipated Discharge Disposition (PT): inpatient rehabilitation facility  Planned Therapy Interventions (PT Eval): balance training, bed mobility training, gait training, home exercise program, strengthening, transfer training, patient/family education  Therapy Frequency (PT Clinical Impression): daily  Outcome Summary/Treatment Plan (PT)  Anticipated Discharge Disposition (PT): inpatient rehabilitation facility  Plan of Care Reviewed With: patient, spouse          Outcome Measures     Row Name 07/13/18 0950 07/13/18 0908          How much help from another person do you currently need...    Turning from your back to your side while in flat bed without using bedrails? 3  -LS  --      Moving from lying on back to sitting on the side of a flat bed without bedrails? 3  -LS  --     Moving to and from a bed to a chair (including a wheelchair)? 2  -LS  --     Standing up from a chair using your arms (e.g., wheelchair, bedside chair)? 2  -LS  --     Climbing 3-5 steps with a railing? 1  -LS  --     To walk in hospital room? 1  -LS  --     AM-PAC 6 Clicks Score 12  -LS  --        How much help from another is currently needed...    Putting on and taking off regular lower body clothing?  -- 2  -STANFORD     Bathing (including washing, rinsing, and drying)  -- 2  -STANFORD     Toileting (which includes using toilet bed pan or urinal)  -- 2  -STANFORD     Putting on and taking off regular upper body clothing  -- 3  -STANFORD     Taking care of personal grooming (such as brushing teeth)  -- 4  -STANFORD     Eating meals  -- 4  -STANFORD     Score  -- 17  -STANFORD        Functional Assessment    Outcome Measure Options AM-PAC 6 Clicks Basic Mobility (PT)  -LS AM-PAC 6 Clicks Daily Activity (OT)  -STANFORD       User Key  (r) = Recorded By, (t) = Taken By, (c) = Cosigned By    Initials Name Provider Type    STANFORD Beatriz Paris, OT Occupational Therapist    RIKI Dai, PT Physical Therapist           Time Calculation:         PT Charges     Row Name 07/13/18 0950             Time Calculation    Start Time 0950  -LS      PT Received On 07/13/18  -      PT Goal Re-Cert Due Date 07/23/18  -        User Key  (r) = Recorded By, (t) = Taken By, (c) = Cosigned By    Initials Name Provider Type     Zenaida Dai, PT Physical Therapist        Therapy Suggested Charges     Code   Minutes Charges    None           Therapy Charges for Today     Code Description Service Date Service Provider Modifiers Qty    84503975462  PT EVAL MOD COMPLEXITY 4 7/13/2018 Zenaida Dai, PT GP 1    31975594878 HC PT THER SUPP EA 15 MIN 7/13/2018 Zenaida Dai, PT GP 2    28102329047 HC PT MOBILITY CURRENT 7/13/2018 Zenaida Dai, PT GP, CL 1    [id number]  PT MOBILITY  PROJECTED 7/13/2018 Zenaida Dai, PT GP, CK 1          PT G-Codes  Outcome Measure Options: AM-PAC 6 Clicks Basic Mobility (PT)  Functional Limitation: Mobility: Walking and moving around  Mobility: Walking and Moving Around Current Status (): At least 60 percent but less than 80 percent impaired, limited or restricted  Mobility: Walking and Moving Around Goal Status (): At least 40 percent but less than 60 percent impaired, limited or restricted      Zenaida Dai, PT  7/13/2018

## 2018-07-14 PROCEDURE — 99233 SBSQ HOSP IP/OBS HIGH 50: CPT | Performed by: INTERNAL MEDICINE

## 2018-07-14 PROCEDURE — 99232 SBSQ HOSP IP/OBS MODERATE 35: CPT | Performed by: PSYCHIATRY & NEUROLOGY

## 2018-07-14 PROCEDURE — G0378 HOSPITAL OBSERVATION PER HR: HCPCS

## 2018-07-14 RX ORDER — OXYCODONE HYDROCHLORIDE AND ACETAMINOPHEN 5; 325 MG/1; MG/1
1 TABLET ORAL EVERY 4 HOURS PRN
Status: DISCONTINUED | OUTPATIENT
Start: 2018-07-14 | End: 2018-07-17 | Stop reason: HOSPADM

## 2018-07-14 RX ADMIN — TRAMADOL HYDROCHLORIDE 50 MG: 50 TABLET, COATED ORAL at 07:46

## 2018-07-14 RX ADMIN — OXYCODONE HYDROCHLORIDE AND ACETAMINOPHEN 1 TABLET: 5; 325 TABLET ORAL at 11:47

## 2018-07-14 NOTE — PLAN OF CARE
Problem: Patient Care Overview  Goal: Plan of Care Review  Outcome: Ongoing (interventions implemented as appropriate)   07/14/18 0419   Coping/Psychosocial   Plan of Care Reviewed With patient;significant other   Patient Care Overview   IRF Plan of Care Review progress ongoing, continue   Progress, Functional Goals demonstrating adequate progress   OTHER   Outcome Summary EVALUATION IS ONGOING [facility] MRI/CT. PT/OT WORKING WITH PT. HAS PIVOTED IN AND OUT OF BED TO CHAIR AND ONTO TOLIET THIS PM. PO MED FOR PAIN PRN. NO NEW ISSUES. DC PLANS PENDING OUTCOMES     Goal: Discharge Needs Assessment  Outcome: Ongoing (interventions implemented as appropriate)      Problem: Fall Risk (Adult)  Goal: Identify Related Risk Factors and Signs and Symptoms  Outcome: Ongoing (interventions implemented as appropriate)      Problem: Skin Injury Risk (Adult)  Goal: Identify Related Risk Factors and Signs and Symptoms  Outcome: Ongoing (interventions implemented as appropriate)    Goal: Skin Health and Integrity  Outcome: Ongoing (interventions implemented as appropriate)

## 2018-07-14 NOTE — PROGRESS NOTES
[facility] Medicine Services  PROGRESS NOTE    Patient Name: Isidra Solano  : 1995  MRN: [id number]    Date of Admission: 2018  Length of Stay: 1  Primary Care Physician: Evelyne Myrick MD    Subjective   Subjective     CC: bilateral LE weakness    HPI:No acute events overnight, patient continues to have LE weakness, does complain of some right ankle pain.    Review of Systems  Gen- No fevers, chills  CV- No chest pain, palpitations  Resp- No cough, dyspnea  GI- No N/V/D, abd pain  Otherwise ROS is negative except as mentioned in the HPI.    Objective   Objective     Vital Signs:   Temp:  [97.6 °F (36.4 °C)-98.8 °F (37.1 °C)] 98.1 °F (36.7 °C)  Heart Rate:  [80-96] 96  Resp:  [18-20] 18  BP: (114-120)/(69-94) 120/94      Physical Exam:  Constitutional: No acute distress, awake, alert, laying in bed  HENT: NCAT, mucous membranes moist  Respiratory: Clear to auscultation bilaterally, respiratory effort normal   Cardiovascular: RRR, no murmurs, rubs, or gallops, palpable pedal pulses bilaterally  Gastrointestinal: Positive bowel sounds, soft, nontender, nondistended  Musculoskeletal: No bilateral ankle edema  Psychiatric: Appropriate affect, cooperative  Neurologic: Oriented x 3,  Bilateral LE weakness 1/5  Skin: No rashes    Results Reviewed:  I have personally reviewed current lab, radiology, and data and agree.      Results from last 7 days  Lab Units 18  0049   WBC 10*3/mm3 7.99   HEMOGLOBIN g/dL 13.3   HEMATOCRIT % 40.1   PLATELETS 10*3/mm3 305       Results from last 7 days  Lab Units 18  0049   SODIUM mmol/L 139   POTASSIUM mmol/L 3.8   CHLORIDE mmol/L 108   CO2 mmol/L 25.0   BUN mg/dL 8*   CREATININE mg/dL 0.82   GLUCOSE mg/dL 108*   CALCIUM mg/dL 8.3*   ALT (SGPT) U/L 13   AST (SGOT) U/L 13     Estimated Creatinine Clearance: 130.1 mL/min (by C-G formula based on SCr of 0.82 mg/dL).  No results found for: BNP    Microbiology Results Abnormal      Procedure Component Value - Date/Time    Culture, CSF - Cerebrospinal Fluid, Lumbar Puncture [[id number]]  (Normal) Collected:  07/13/18 0317    Lab Status:  Preliminary result Specimen:  Cerebrospinal Fluid from Lumbar Puncture Updated:  07/14/18 0903     CSF Culture No growth     Gram Stain Result No WBCs or organisms seen          Imaging Results (last 24 hours)     Procedure Component Value Units Date/Time    MRI Thoracic Spine With & Without Contrast [[id number]] Collected:  07/13/18 2147     Updated:  07/13/18 2152    Narrative:       EXAMINATION: MRI THORACIC SPINE W WO CONTRAST-     INDICATION: BLE weakness; G61.8-Smgkbwci-Xprrz syndrome; Z74.09-Other  reduced mobility; Z74.09-Other reduced mobility      TECHNIQUE: Multiplanar MRI of the thoracic spine with and without  intravenous contrast administration     COMPARISON: NONE     FINDINGS: Sagittal datasets reveal normal alignment of the thoracic  segments without focal subluxation. Preservation of vertebral body  heights and intervertebral disc height spaces with disc well hydrated.  Facets are well aligned.     Axial dataset evaluation without paraspinal hematoma or paraspinal soft  tissue abnormality of concern. No focal fluid collection is identified  within the paraspinal region.     Axial datasets evaluation of the cord demonstrate normal signal without  cord edema or myelomalacia. No intrathecal mass occupying lesion. Noted  spinal canal stenosis or neuroforaminal stenosis identified.     Postcontrast administration sequences without abnormal enhancement  identified specifically no abnormal cord enhancement or intrathecal  enhancement of concern. No abnormal bone marrow signal or enhancement  within the osseous structures.          Impression:       No acute pathology of the thoracic spine with normal bone  marrow signal and intrinsic signal of the thoracic cord. No abnormal  enhancement intrathecal or thoracic cord enhancement. No significant  spinal  canal or neuroforaminal stenosis.         This report was finalized on 7/13/2018 9:50 PM by Dr. Eliot Esposito.       MRI Cervical Spine With & Without Contrast [[id number]] Collected:  07/13/18 2144     Updated:  07/13/18 2149    Narrative:       EXAMINATION: MRI CERVICAL SPINE W WO CONTRAST-     INDICATION: BLE weakness; G61.7-Wycpohnh-Upbxx syndrome; Z74.09-Other  reduced mobility; Z74.09-Other reduced mobility      TECHNIQUE: Multiplanar MRI of the cervical spine with and without  intravenous contrast administration     COMPARISON: NONE     FINDINGS:      Sagittal datasets reveal flattening of the normal cervical lordotic  curvature without focal subluxation. Preservation of vertebral body  heights with normal appearance of the bone marrow signal.  Cervicomedullary junction widely patent. Axial datasets evaluation for  paraspinal soft tissue findings without paraspinal soft tissue  abnormality of concern specifically no paraspinal hematoma or fluid  collection.     Axial datasets evaluation for degenerative/spondylitic changes without  significant spinal canal or neuroforaminal stenosis identified within  the cervical levels. Normal signal within the cervical cord. No syrinx  or cord edema identified.     Postcontrast administration sequences without abnormal enhancement  specifically no abnormal enhancing intrathecal mass lesion or abnormal  enhancement within the cervical cord.          Impression:       Mild flattening of the cervical lordotic curvature maps and  muscular spasm and/or patient positioning. No acute pathology of the  cervical spine otherwise identified specifically no abnormal enhancement  or intrathecal space-occupying lesion. Normal cord signal without  stenosis and spinal canal or neuroforaminal stenosis.     This report was finalized on 7/13/2018 9:47 PM by Dr. Eliot Esposito.           I have reviewed the medications.    Assessment/Plan   Assessment / Plan     Hospital Problem List     *  (Principal)Lower extremity weakness    Chronic migraine without aura without status migrainosus, not intractable    Anxiety and depression    Asthma    Guillain Barré syndrome (CMS/HCC)          Brief Hospital Course to date:  Isidra Solano is a 23 y.o. female  otherwise healthy or history of asthma, presents to [facility] with 4-5 days of progressively worsening lower extremity weakness to the point of paralysis, prior to this episode patient does endorse having bilateral bruising on her thighs, denies any trauma.  Patient does endorse having a viral syndrome week prior    Assessment & Plan:  -  Bilateral worsening bilateral lower extremity paralysis, etiology currently unknown, lower motor neuropathy likely? Guillain-Barré syndrome suspected paralysis is not ascending, neurology consulted their impression is that this is a myelopathic process, metabolic/autoimmune process vs conversion d/o GBS is unlikely, MRI C/T-spine done and are unrevealing, f/u autoimmune panels  -Bilateral spontaneous bruises, significance of this in addition to above symptoms is unknown, platelets are wnl  - Hx of asthma, not in exacerbations.    DVT Prophylaxis: mechanical    CODE STATUS:   Code Status and Medical Interventions:   Ordered at: 07/13/18 0418     Level Of Support Discussed With:    Patient     Code Status:    CPR     Medical Interventions (Level of Support Prior to Arrest):    Full     Disposition: anticipate she will need rehab    Electronically signed by Sonia Perez MD, 07/14/18, 9:14 AM.

## 2018-07-14 NOTE — PLAN OF CARE
Problem: Skin Injury Risk (Adult)  Goal: Identify Related Risk Factors and Signs and Symptoms  Outcome: Ongoing (interventions implemented as appropriate)   07/13/18 9093   Skin Injury Risk (Adult)   Related Risk Factors (Skin Injury Risk) mechanical forces;mobility impaired;moisture   No change.

## 2018-07-14 NOTE — PROGRESS NOTES
"   LOS: 1 day   Patient Care Team:  Evelyne Myrick MD as PCP - General (Family Medicine)    Chief Complaint: salvador LE weakness    Subjective     History of Present Illness    Subjective    History taken from: patient chart    Pt states she cannot move legs but will stand at bedside.  Reports numb from mid thigh down.      Objective     Vital Signs  Temp:  [97.6 °F (36.4 °C)-98.8 °F (37.1 °C)] 98.1 °F (36.7 °C)  Heart Rate:  [80-96] 96  Resp:  [18-20] 18  BP: (114-120)/(69-94) 120/94    Objective:  General Appearance:  Comfortable.    Vital signs: (most recent): Blood pressure 120/94, pulse 96, temperature 98.1 °F (36.7 °C), resp. rate 18, height 168.9 cm (66.5\"), weight 101 kg (221 lb 9 oz), SpO2 98 %, not currently breastfeeding.  Vital signs are normal.    Pupils:  Pupils are equal, round, and reactive to light.        Neurological Exam    Mental Status  The patient is and oriented to person, place, time, and situation. Her recent and remote memory are normal. Her speech is normal. Her language is fluent with no aphasia. She has normal attention span and concentration.     Cranial Nerves    CN II: The patient's visual acuity and visual fields are normal.  CN III, IV, VI: The patient's pupils are equally round and reactive to light and ocular movements are normal.  CN V: The patient has normal facial sensation  CN VII:  The patient has symmetric facial movement  CN VIII:  The patient's hearing is normal.  CN IX, X: The patient has symmetric palate movement and normal gag reflex.  CN XI: The patient's shoulder shrug strength is normal.  CN XII: The patient's tongue is midline without atrophy or fasciculations.    Motor   Her strength is 5/5 in all four extremities except as noted.  Will move legs with distraction and can stand but asked to move legs states she bean      Sensory    States no sensation from mid thigh down      Reflexes  Deep tendon reflexes are 2+ and symmetric in all four extremities with " downgoing toes bilaterally.    Gait and Coordination   She has abnormal right heel to shin and abnormal left heel to shin coordination. She has normal right finger to nose and normal left finger to nose coordination.  Can stand but will not take steps        Results Review:     I reviewed the patient's new clinical results.  I reviewed the patient's new imaging results and agree with the interpretation.  I reviewed the patient's other test results and agree with the interpretation    CSF - port 17, gluc 60, RBC 1, WBC, 1.5,   CBC, CMP - NCS    MRI Brain/C/T/L, my review of films, normal     Assessment/Plan     Principal Problem:    Lower extremity weakness  Active Problems:    Chronic migraine without aura without status migrainosus, not intractable    Anxiety and depression    Asthma    Guillain Barré syndrome (CMS/HCC)      Assessment & Plan    1.  Bilateral LE weakness - normal CSF and MRI B/C/T/L.  No evidence of CNS lesion to explain weakness.  Inconsistent exam.  Order EMG/NCS of the leg       Wilber Morales MD  07/14/18  11:20 AM

## 2018-07-15 PROCEDURE — 99232 SBSQ HOSP IP/OBS MODERATE 35: CPT | Performed by: INTERNAL MEDICINE

## 2018-07-15 PROCEDURE — G0378 HOSPITAL OBSERVATION PER HR: HCPCS

## 2018-07-15 PROCEDURE — 99231 SBSQ HOSP IP/OBS SF/LOW 25: CPT | Performed by: PSYCHIATRY & NEUROLOGY

## 2018-07-15 PROCEDURE — 97530 THERAPEUTIC ACTIVITIES: CPT

## 2018-07-15 NOTE — PLAN OF CARE
Problem: Patient Care Overview  Goal: Plan of Care Review  Outcome: Ongoing (interventions implemented as appropriate)   07/15/18 7719   Coping/Psychosocial   Plan of Care Reviewed With patient   OTHER   Outcome Summary Initiated theraband HEP. Recommend continued skilled OT services to increase independence with ADL's. Pt would benefit from inpatient rehab following acute care.   Plan of Care Review   Progress improving

## 2018-07-15 NOTE — PLAN OF CARE
Problem: Patient Care Overview  Goal: Plan of Care Review  Outcome: Ongoing (interventions implemented as appropriate)   07/14/18 0419 07/15/18 0311 07/15/18 1327   Coping/Psychosocial   Plan of Care Reviewed With --  --  patient   Patient Care Overview   IRF Plan of Care Review progress ongoing, continue --  --    Progress, Functional Goals demonstrating adequate progress --  --    OTHER   Outcome Summary --  VSS, no c/o pain, Pt pivoted to the [facility] and then to Wheelchair with assistaqnce X2 and a gait belt. watching UOP closely due to need for I&O cath on dayshift. dc plans TBD upon results. Pt slept well throughout the night.  --      Goal: Discharge Needs Assessment  Outcome: Ongoing (interventions implemented as appropriate)   07/13/18 0600 07/13/18 0623 07/13/18 1049   Discharge Needs Assessment   Readmission Within the Last 30 Days --  no previous admission in last 30 days --    Concerns to be Addressed --  --  adjustment to diagnosis/illness   Patient/Family Anticipates Transition to --  --  home with family   Patient/Family Anticipated Services at Transition --  --  none   Transportation Concerns --  --  car, none   Transportation Anticipated family or friend will provide  (pendimng outcome) --  --    Equipment Needed After Discharge --  --  wheelchair, manual   Disability   Equipment Currently Used at Home --  --  none  (has a walker and cane can use)       Problem: Fall Risk (Adult)  Intervention: Monitor/Assist with Self Care   07/15/18 1327   Activity   Activity Assistance Provided assistance, 2 people     Intervention: Reduce Risk/Promote Restraint Free Environment   07/14/18 0600 07/15/18 1327   Safety Management   Environmental Safety Modification --  assistive device/personal items within reach;clutter free environment maintained;lighting adjusted   Safety Promotion/Fall Prevention --  activity supervised;fall prevention program maintained;gait belt;nonskid shoes/slippers when out of bed;safety  round/check completed;toileting scheduled   Prevent [place] Drop/Fall   Safety/Security Measures family to remain at bedside --      Intervention: Review Medications/Identify Contributors to Fall Risk   07/15/18 07   Safety Management   Medication Review/Management medications reviewed       Goal: Absence of Fall  Outcome: Ongoing (interventions implemented as appropriate)   07/15/18 031   Fall Risk (Adult)   Absence of Fall making progress toward outcome       Problem: Skin Injury Risk (Adult)  Intervention: Prevent/Manage Excess Moisture   18 0046 18 1900 07/15/18 07   Hygiene Care   Perineal Care absorbent pad changed --  --    Bathing/Skin Care --  patient refused --    Skin Interventions   Skin Protection --  --  adhesive use limited;incontinence pads utilized     Intervention: Maintain Head of Bed Elevation Less Than 30 Degrees as Tolerated   07/15/18 132   Positioning   Head of Bed (HOB) HOB elevated     Intervention: Prevent/Minimize Shear/Friction Injuries   07/15/18 0746 07/15/18 1327   Skin Interventions   Pressure Reduction Devices pressure-redistributing mattress utilized --    Positioning   Positioning/Transfer Devices --  pillows;in use     Intervention: Prevent or Minimize Pressure   07/15/18 0746 07/15/18 132   Skin Interventions   Pressure Reduction Techniques weight shift assistance provided --    Positioning   Body Position --  supine       Goal: Identify Related Risk Factors and Signs and Symptoms  Outcome: Ongoing (interventions implemented as appropriate)   18 0623   Skin Injury Risk (Adult)   Related Risk Factors (Skin Injury Risk) mechanical forces;mobility impaired;moisture     Goal: Skin Health and Integrity  Outcome: Ongoing (interventions implemented as appropriate)   07/15/18 031   Skin Injury Risk (Adult)   Skin Health and Integrity making progress toward outcome       Problem: Patient Care Overview  Goal: Plan of Care Review  Outcome: Ongoing (interventions  implemented as appropriate)   07/15/18 1327   Coping/Psychosocial   Plan of Care Reviewed With patient     Goal: Discharge Needs Assessment  Outcome: Ongoing (interventions implemented as appropriate)   07/13/18 0600 07/13/18 0623 07/13/18 1049   Discharge Needs Assessment   Readmission Within the Last 30 Days --  no previous admission in last 30 days --    Concerns to be Addressed --  --  adjustment to diagnosis/illness   Patient/Family Anticipates Transition to --  --  home with family   Patient/Family Anticipated Services at Transition --  --  none   Transportation Concerns --  --  car, none   Transportation Anticipated family or friend will provide  (pendimng outcome) --  --    Equipment Needed After Discharge --  --  wheelchair, manual   Disability   Equipment Currently Used at Home --  --  none  (has a walker and cane can use)     Goal: Interprofessional Rounds/Family Conf  Outcome: Ongoing (interventions implemented as appropriate)   07/15/18 1415   Interdisciplinary Rounds/Family Conf   Participants nursing

## 2018-07-15 NOTE — PROGRESS NOTES
[facility] Medicine Services  PROGRESS NOTE    Patient Name: Isidra Solano  : 1995  MRN: [id number]    Date of Admission: 2018  Length of Stay: 1  Primary Care Physician: Evelyne Myrick MD    Subjective   Subjective     CC:LE weakness    HPI: No acute events overnight, patient states that she is having some sensation on her thighs, though still weak.    Review of Systems  Gen- No fevers, chills  CV- No chest pain, palpitations  Resp- No cough, dyspnea  GI- No N/V/D, abd pain    Otherwise ROS is negative except as mentioned in the HPI.    Objective   Objective     Vital Signs:   Temp:  [98 °F (36.7 °C)-98.5 °F (36.9 °C)] 98.5 °F (36.9 °C)  Heart Rate:  [72-83] 83  Resp:  [16-20] 18  BP: (107-116)/(58-69) 107/58        Physical Exam:  Constitutional: No acute distress, awake, alert  HENT: NCAT, mucous membranes moist  Respiratory: Clear to auscultation bilaterally, respiratory effort normal   Cardiovascular: RRR, no murmurs, rubs, or gallops, palpable pedal pulses bilaterally  Gastrointestinal: Positive bowel sounds, soft, nontender, nondistended  Musculoskeletal: No bilateral ankle edema  Psychiatric: Appropriate affect, cooperative  Neurologic: Oriented x 3, bilateral LE weakness    Results Reviewed:  I have personally reviewed current lab, radiology, and data and agree.      Results from last 7 days  Lab Units 18  0049   WBC 10*3/mm3 7.99   HEMOGLOBIN g/dL 13.3   HEMATOCRIT % 40.1   PLATELETS 10*3/mm3 305       Results from last 7 days  Lab Units 18  0049   SODIUM mmol/L 139   POTASSIUM mmol/L 3.8   CHLORIDE mmol/L 108   CO2 mmol/L 25.0   BUN mg/dL 8*   CREATININE mg/dL 0.82   GLUCOSE mg/dL 108*   CALCIUM mg/dL 8.3*   ALT (SGPT) U/L 13   AST (SGOT) U/L 13     Estimated Creatinine Clearance: 130.1 mL/min (by C-G formula based on SCr of 0.82 mg/dL).  No results found for: BNP    Microbiology Results Abnormal     Procedure Component Value - Date/Time     Culture, CSF - Cerebrospinal Fluid, Lumbar Puncture [[id number]]  (Normal) Collected:  07/13/18 0316    Lab Status:  Preliminary result Specimen:  Cerebrospinal Fluid from Lumbar Puncture Updated:  07/15/18 3974     CSF Culture No growth at 2 days     Gram Stain Result No WBCs or organisms seen        I have reviewed the medications.    Assessment/Plan   Assessment / Plan     Hospital Problem List     * (Principal)Lower extremity weakness    Chronic migraine without aura without status migrainosus, not intractable    Anxiety and depression    Asthma    Guillain Barré syndrome (CMS/HCC)         Brief Hospital Course to date:  Isidra Solano is a 23 y.o. female  otherwise healthy or history of asthma, presents to [facility] with 4-5 days of progressively worsening lower extremity weakness to the point of paralysis, prior to this episode patient does endorse having bilateral bruising on her thighs, denies any trauma.  Patient does endorse having a viral syndrome week prior     Assessment & Plan:  - Bilateral worsening lower extremity paralysis, etiology currently unknown, lower motor neuropathy likely? Guillain-Barré syndrome suspected but unlikely, neurology consulted their impression is that this is a myelopathic process, metabolic/autoimmune process vs conversion d/o. However thus far MRI L/C/T-spine are unrevealing, csf is normal, f/u autoimmune panels, EMG/NCS ordered.  - Bilateral spontaneous bruises, significance of this in addition to above symptoms is unknown, platelets are wnl  - Hx of asthma, not in exacerbations.  - PT/OT.     DVT Prophylaxis: mechanical     CODE STATUS:   Code Status and Medical Interventions:   Ordered at: 07/13/18 0418     Level Of Support Discussed With:    Patient     Code Status:    CPR     Medical Interventions (Level of Support Prior to Arrest):    Full     Disposition: TBD, anticipate she will need rehab.    Electronically signed by Sonia Perez MD, 07/15/18, 9:30 AM.

## 2018-07-15 NOTE — PROGRESS NOTES
"   LOS: 1 day   Patient Care Team:  Evelyne Myrick MD as PCP - General (Family Medicine)    Chief Complaint: bilateral lower extremity weakness       History of Present Illness    Subjective    Pt reports sensation returned above knees but still cannot feel below knees.    History taken from: patient chart family    Objective     Vital Signs  Temp:  [98 °F (36.7 °C)-98.5 °F (36.9 °C)] 98.5 °F (36.9 °C)  Heart Rate:  [72-83] 83  Resp:  [16-20] 18  BP: (107-116)/(58-69) 107/58    Objective:  General Appearance:  Comfortable.    Vital signs: (most recent): Blood pressure 107/58, pulse 83, temperature 98.5 °F (36.9 °C), temperature source Oral, resp. rate 18, height 168.9 cm (66.5\"), weight 101 kg (221 lb 9 oz), SpO2 98 %, not currently breastfeeding.  Vital signs are normal.    Pupils:  Pupils are equal, round, and reactive to light.          Neurological Exam    Mental Status  The patient is alert and oriented to person, place, time, and situation. Her recent and remote memory are normal.     Cranial Nerves    CN II: The patient's visual acuity and visual fields are normal.  CN III, IV, VI: The patient's pupils are equally round and reactive to light and ocular movements are normal.  CN V: The patient has normal facial sensation  CN VII:  The patient has symmetric facial movement  CN VIII:  The patient's hearing is normal.  CN IX, X: The patient has symmetric palate movement and normal gag reflex.  CN XI: The patient's shoulder shrug strength is normal.  CN XII: The patient's tongue is midline without atrophy or fasciculations.    Motor                                                Right                   Left  Hip flexion                                2                         2  Hip extension                           2                         2  Hip abduction                           2                         2  Hip adduction                           2                         2  Knee flexion                  "            2                         2  Knee extension                         2                         2  Ankle inversion                         2                         2  Ankle eversion                          2                         2  Plantarflexion                            2                         2  Dorsiflexion                               2                         2  Toe flexion                                2                         2  Toe extension                           2                         2      Sensory   She has a dermatomal sensory deficit.  Decreased PP and LT from knees down      Reflexes  Deep tendon reflexes are 2+ and symmetric in all four extremities with downgoing toes bilaterally.    Gait and Coordination   She has abnormal right heel to shin and abnormal left heel to shin coordination. She has normal right finger to nose and normal left finger to nose coordination.      Results Review:     I reviewed the patient's new clinical results.       Assessment/Plan     Principal Problem:    Lower extremity weakness  Active Problems:    Chronic migraine without aura without status migrainosus, not intractable    Anxiety and depression    Asthma    Guillain Barré syndrome (CMS/HCC)      Assessment & Plan    1.  Bilateral lower extremity weakness - EMG/NCS ordered for am.  Autoimmune labs pending.     Wilber Morales MD  07/15/18  11:44 AM

## 2018-07-15 NOTE — THERAPY TREATMENT NOTE
Acute Care - Occupational Therapy Treatment Note  [facility]     Patient Name: Isidra Solano  : 1995  MRN: [id number]  Today's Date: 7/15/2018  Onset of Illness/Injury or Date of Surgery: 18  Date of Referral to OT: 18  Referring Physician: ANNA Ch    Admit Date: 2018       ICD-10-CM ICD-9-CM   1. 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 Impaired mobility and ADLs Z74.09 799.89   3. Impaired functional mobility, balance, gait, and endurance Z74.09 V49.89     Patient Active Problem List   Diagnosis   • Chronic migraine without aura without status migrainosus, not intractable   • Chronic daily headache   • Pregnancy   • Postpartum care following vaginal delivery   • Anxiety and depression   • Asthma   • Lower extremity weakness   • Guillain Barré syndrome (CMS/HCC)     Past Medical History:   Diagnosis Date   • Anxiety    • Asthma    • Depression    • Migraine      Past Surgical History:   Procedure Laterality Date   • CHOLECYSTECTOMY  2016       Therapy Treatment          Rehabilitation Treatment Summary     Row Name 07/15/18 1516             Treatment Time/Intention    Discipline occupational therapist  -JR      Document Type therapy note (daily note)  -JR      Subjective Information no complaints  -JR      Mode of Treatment occupational therapy  -JR      Care Plan Review risks/benefits reviewed;patient/other agree to care plan  -JR      Patient Effort good  -JR      Existing Precautions/Restrictions fall  -JR      Recorded by [JR] Abigail Godoy, OT 07/15/18 1539      Row Name 07/15/18 1516             Cognitive Assessment/Intervention- PT/OT    Affect/Mental Status (Cognitive) WNL  -JR      Orientation Status (Cognition) oriented x 3  -JR      Follows Commands (Cognition) WNL  -JR      Recorded by [JR] Abigail Godoy, OT 07/15/18 1539      Row Name 07/15/18 1516             Bed Mobility Assessment/Treatment    Comment (Bed Mobility) Defer-up in w/c  -JR       Recorded by [JR] Abigail Godoy, OT 07/15/18 1539      Row Name 07/15/18 1516             Motor Skills Assessment/Interventions    Additional Documentation Therapeutic Exercise (Group);Therapeutic Exercise Interventions (Group)  -JR      Recorded by [JR] Abigail Godoy, OT 07/15/18 1539      Row Name 07/15/18 1516             Therapeutic Exercise    14758 - OT Therapeutic Activity Minutes 11  -JR      Recorded by [JR] Abigail Godoy, OT 07/15/18 1539      Row Name 07/15/18 1516             Therapeutic Exercise    Upper Extremity Range of Motion (Therapeutic Exercise) shoulder flexion/extension, bilateral;shoulder horizontal abduction/adduction, bilateral;elbow flexion/extension, bilateral;shoulder internal/external rotation, bilateral  -JR      Weight/Resistance (Therapeutic Exercise) yellow   therabansd  -JR      Exercise Type (Therapeutic Exercise) resistive exercises  -JR      Position (Therapeutic Exercise) seated  -JR      Sets/Reps (Therapeutic Exercise) 10 reps, except 5 reps horizontal ab/adduction  -JR      Recorded by [JR] Abigail Godoy, OT 07/15/18 1539      Row Name 07/15/18 1516             Positioning and Restraints    Pre-Treatment Position sitting in chair/recliner  -JR      Post Treatment Position wheelchair  -JR      In Wheelchair notified nsg;sitting;call light within reach;encouraged to call for assist;on mechanical lift sling  -JR      Recorded by [JR] Abigail Godoy, OT 07/15/18 1539      Row Name 07/15/18 1516             Pain Scale: Numbers Pre/Post-Treatment    Pain Scale: Numbers, Pretreatment 0/10 - no pain  -JR      Pain Scale: Numbers, Post-Treatment 0/10 - no pain  -JR      Recorded by [JR] Abigail Godoy, OT 07/15/18 1539      Row Name                Wound 07/13/18 0638 Left upper thigh abrasion    Wound - Properties Group Date first assessed: 07/13/18 [KR] Time first assessed: 0638 [KR] Present On Admission : yes [KR] Side: Left [KR] Orientation: upper [KR] Location:  thigh [KR] Type: abrasion [KR] Recorded by:  [KR] Joanne Soares RN 07/13/18 0638    Row Name 07/15/18 1516             Plan of Care Review    Plan of Care Reviewed With patient  -JR      Recorded by [JR] Abigail Godoy, OT 07/15/18 1539      Row Name 07/15/18 1516             Outcome Summary/Treatment Plan (OT)    Daily Summary of Progress (OT) progress toward functional goals is good  -JR      Barriers to Overall Progress (OT) medically complex  -JR      Plan for Continued Treatment (OT) Continue OT per POC  -JR      Anticipated Discharge Disposition (OT) inpatient rehabilitation facility  -JR      Recorded by [JR] Abigail Godoy, OT 07/15/18 1539        User Key  (r) = Recorded By, (t) = Taken By, (c) = Cosigned By    Initials Name Effective Dates Discipline    JR Abigail Godoy, OT 06/22/15 -  OT    KR Joanne Soares RN 06/16/16 -  Nurse        Wound 07/13/18 0638 Left upper thigh abrasion (Active)   Dressing Appearance dry;open to air 7/15/2018  3:21 PM   Periwound intact 7/15/2018  3:21 PM   Dressing Care, Wound open to air 7/14/2018  8:00 PM         Occupational Therapy Education     Title: PT OT SLP Therapies (Active)     Topic: Occupational Therapy (Active)     Point: ADL training (Done)     Description: Instruct learner(s) on proper safety adaptation and remediation techniques during self care or transfers.   Instruct in proper use of assistive devices.   Learning Progress Summary     Learner Status Readiness Method Response Comment Documented by    Patient Done Acceptance E,VETO CARR,NR role OT, reason for consult, noted deficits, leg  use, bed mobility,goal setting. STANFORD 07/13/18 0954          Point: Home exercise program (Active)     Description: Instruct learner(s) on appropriate technique for monitoring, assisting and/or progressing therapeutic exercises/activities.   Learning Progress Summary     Learner Status Readiness Method Response Comment Documented by    Patient Active Acceptance E NR  Educated pt regarding theraband HEP  07/15/18 1539     Done Acceptance VETO ESPOSITO,NR role OT, reason for consult, noted deficits, leg  use, bed mobility,goal setting.  07/13/18 0954          Point: Precautions (Done)     Description: Instruct learner(s) on prescribed precautions during self-care and functional transfers.   Learning Progress Summary     Learner Status Readiness Method Response Comment Documented by    Patient Done Acceptance E,VETO CARR,NR role OT, reason for consult, noted deficits, leg  use, bed mobility,goal setting.  07/13/18 0954                      User Key     Initials Effective Dates Name Provider Type Discipline     06/08/18 -  Beatriz Paris, OT Occupational Therapist OT     06/22/15 -  Abigail Godoy, OT Occupational Therapist OT                OT Recommendation and Plan  Outcome Summary/Treatment Plan (OT)  Daily Summary of Progress (OT): progress toward functional goals is good  Barriers to Overall Progress (OT): medically complex  Plan for Continued Treatment (OT): Continue OT per POC  Anticipated Discharge Disposition (OT): inpatient rehabilitation facility  Daily Summary of Progress (OT): progress toward functional goals is good  Plan of Care Review  Plan of Care Reviewed With: patient  Plan of Care Reviewed With: patient  Outcome Summary: Initiated theraband HEP. Recommend continued skilled OT services to increase independence with ADL's. Pt would benefit from inpatient rehab following acute care.        Outcome Measures     Row Name 07/15/18 1516 07/13/18 0950 07/13/18 0908       How much help from another person do you currently need...    Turning from your back to your side while in flat bed without using bedrails?  -- 3  -LS  --    Moving from lying on back to sitting on the side of a flat bed without bedrails?  -- 3  -LS  --    Moving to and from a bed to a chair (including a wheelchair)?  -- 2  -LS  --    Standing up from a chair using your arms (e.g.,  wheelchair, bedside chair)?  -- 2  -LS  --    Climbing 3-5 steps with a railing?  -- 1  -LS  --    To walk in hospital room?  -- 1  -LS  --    AM-PAC 6 Clicks Score  -- 12  -LS  --       How much help from another is currently needed...    Putting on and taking off regular lower body clothing? 2  -JR  -- 2  -STANFORD    Bathing (including washing, rinsing, and drying) 2  -JR  -- 2  -STANFORD    Toileting (which includes using toilet bed pan or urinal) 2  -JR  -- 2  -STANFORD    Putting on and taking off regular upper body clothing 3  -JR  -- 3  -STANFORD    Taking care of personal grooming (such as brushing teeth) 4  -JR  -- 4  -STANFORD    Eating meals 4  -JR  -- 4  -STANFORD    Score 17  -JR  -- 17  -STANFORD       Functional Assessment    Outcome Measure Options AM-PAC 6 Clicks Daily Activity (OT)  -JR AM-PAC 6 Clicks Basic Mobility (PT)  -LS AM-PAC 6 Clicks Daily Activity (OT)  -STANFORD      User Key  (r) = Recorded By, (t) = Taken By, (c) = Cosigned By    Initials Name Provider Type    STANFORD Paris, OT Occupational Therapist    JR Abigail Godoy, OT Occupational Therapist    RIKI Dai, PT Physical Therapist           Time Calculation:         Time Calculation- OT     Row Name 07/15/18 1516             Time Calculation- OT    OT Start Time 1516  -      Total Timed Code Minutes- OT 11 minute(s)  -      OT Received On 07/15/18  -         Timed Charges    92938 - OT Therapeutic Activity Minutes 11  -JR        User Key  (r) = Recorded By, (t) = Taken By, (c) = Cosigned By    Initials Name Provider Type    JR Abigail Godoy, OT Occupational Therapist           Therapy Suggested Charges     Code   Minutes Charges    88601 (CPT®) Hc Ot Neuromusc Re Education Ea 15 Min      77066 (CPT®) Hc Ot Ther Proc Ea 15 Min      25021 (CPT®) Hc Ot Therapeutic Act Ea 15 Min 11 1    40102 (CPT®) Hc Ot Manual Therapy Ea 15 Min      24822 (CPT®) Hc Ot Iontophoresis Ea 15 Min      52998 (CPT®) Hc Ot Elec Stim Ea-Per 15 Min      41717 (CPT®) Hc Ot Ultrasound Ea  15 Min      94632 (CPT®) Hc Ot Self Care/Mgmt/Train Ea 15 Min      Total  11 1        Therapy Charges for Today     Code Description Service Date Service Provider Modifiers Qty    36646559576 HC OT THERAPEUTIC ACT EA 15 MIN 7/15/2018 Abigail Godoy, OT GO 1               Abigail Godoy, OT  7/15/2018

## 2018-07-15 NOTE — PLAN OF CARE
Problem: Patient Care Overview  Goal: Plan of Care Review  Outcome: Ongoing (interventions implemented as appropriate)   07/14/18 0419 07/14/18 2000 07/15/18 0311   Coping/Psychosocial   Plan of Care Reviewed With --  patient --    Patient Care Overview   IRF Plan of Care Review progress ongoing, continue --  --    Progress, Functional Goals demonstrating adequate progress --  --    OTHER   Outcome Summary --  --  VSS, no c/o pain, Pt pivoted to the BSC and then to Wheelchair with assistaqnce X2 and a gait belt. watching UOP closely due to need for I&O cath on dayshift. dc plans TBD upon results. Pt slept well throughout the night.      Goal: Individualization and Mutuality  Outcome: Ongoing (interventions implemented as appropriate)    Goal: Discharge Needs Assessment  Outcome: Ongoing (interventions implemented as appropriate)      Problem: Fall Risk (Adult)  Goal: Identify Related Risk Factors and Signs and Symptoms  Outcome: Ongoing (interventions implemented as appropriate)    Goal: Absence of Fall  Outcome: Ongoing (interventions implemented as appropriate)      Problem: Skin Injury Risk (Adult)  Goal: Identify Related Risk Factors and Signs and Symptoms  Outcome: Ongoing (interventions implemented as appropriate)    Goal: Skin Health and Integrity  Outcome: Ongoing (interventions implemented as appropriate)

## 2018-07-16 ENCOUNTER — APPOINTMENT (OUTPATIENT)
Dept: NEUROLOGY | Facility: HOSPITAL | Age: 23
End: 2018-07-16
Attending: PSYCHIATRY & NEUROLOGY

## 2018-07-16 LAB
ALBUMIN SERPL-MCNC: 3.3 G/DL (ref 2.9–4.4)
ALBUMIN/GLOB SERPL: 1.1 {RATIO} (ref 0.7–1.7)
ALPHA1 GLOB FLD ELPH-MCNC: 0.3 G/DL (ref 0–0.4)
ALPHA2 GLOB SERPL ELPH-MCNC: 0.8 G/DL (ref 0.4–1)
B-GLOBULIN SERPL ELPH-MCNC: 1 G/DL (ref 0.7–1.3)
GAMMA GLOB SERPL ELPH-MCNC: 1 G/DL (ref 0.4–1.8)
GLOBULIN SER CALC-MCNC: 3.1 G/DL (ref 2.2–3.9)
Lab: NORMAL
M-SPIKE: NORMAL G/DL
PROT PATTERN SERPL ELPH-IMP: NORMAL
PROT SERPL-MCNC: 6.4 G/DL (ref 6–8.5)

## 2018-07-16 PROCEDURE — 99233 SBSQ HOSP IP/OBS HIGH 50: CPT | Performed by: INTERNAL MEDICINE

## 2018-07-16 PROCEDURE — 97110 THERAPEUTIC EXERCISES: CPT

## 2018-07-16 PROCEDURE — 99231 SBSQ HOSP IP/OBS SF/LOW 25: CPT | Performed by: PSYCHIATRY & NEUROLOGY

## 2018-07-16 PROCEDURE — 97530 THERAPEUTIC ACTIVITIES: CPT

## 2018-07-16 PROCEDURE — 95911 NRV CNDJ TEST 9-10 STUDIES: CPT

## 2018-07-16 PROCEDURE — 95886 MUSC TEST DONE W/N TEST COMP: CPT

## 2018-07-16 PROCEDURE — 95910 NRV CNDJ TEST 7-8 STUDIES: CPT

## 2018-07-16 RX ORDER — ONDANSETRON 2 MG/ML
4 INJECTION INTRAMUSCULAR; INTRAVENOUS EVERY 6 HOURS PRN
Status: DISCONTINUED | OUTPATIENT
Start: 2018-07-16 | End: 2018-07-17 | Stop reason: HOSPADM

## 2018-07-16 NOTE — PLAN OF CARE
Problem: Patient Care Overview  Goal: Plan of Care Review  Outcome: Ongoing (interventions implemented as appropriate)   07/16/18 1815   Coping/Psychosocial   Plan of Care Reviewed With patient;family   Patient Care Overview   IRF Plan of Care Review progress ongoing, continue   Progress, Functional Goals demonstrating adequate progress   OTHER   Outcome Summary VSS. Denies pain. Tolerating diet without complaints of nausea. Tolerated increase in mobility today but continues to have significant BLE ROM deficits.       Problem: Fall Risk (Adult)  Goal: Absence of Fall  Outcome: Ongoing (interventions implemented as appropriate)   07/16/18 1815   Fall Risk (Adult)   Absence of Fall making progress toward outcome       Problem: Skin Injury Risk (Adult)  Goal: Skin Health and Integrity  Outcome: Ongoing (interventions implemented as appropriate)   07/16/18 1815   Skin Injury Risk (Adult)   Skin Health and Integrity making progress toward outcome

## 2018-07-16 NOTE — THERAPY TREATMENT NOTE
Acute Care - Occupational Therapy Treatment Note  [facility]     Patient Name: Isidra Solano  : 1995  MRN: [id number]  Today's Date: 2018  Onset of Illness/Injury or Date of Surgery: 18  Date of Referral to OT: 18  Referring Physician: ANNA Ch    Admit Date: 2018       ICD-10-CM ICD-9-CM   1. 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 Impaired mobility and ADLs Z74.09 799.89   3. Impaired functional mobility, balance, gait, and endurance Z74.09 V49.89     Patient Active Problem List   Diagnosis   • Chronic migraine without aura without status migrainosus, not intractable   • Chronic daily headache   • Pregnancy   • Postpartum care following vaginal delivery   • Anxiety and depression   • Asthma   • Lower extremity weakness   • Guillain Barré syndrome (CMS/HCC)     Past Medical History:   Diagnosis Date   • Anxiety    • Asthma    • Depression    • Migraine      Past Surgical History:   Procedure Laterality Date   • CHOLECYSTECTOMY  2016       Therapy Treatment          Rehabilitation Treatment Summary     Row Name 18 1450 18 1126          Treatment Time/Intention    Discipline occupational therapist  -CL physical therapist  -LS,CM,LS2     Document Type therapy note (daily note)  -CL therapy note (daily note)  -LS,CM,LS2     Subjective Information no complaints  -CL no complaints  -LS,CM,LS2     Mode of Treatment  -- physical therapy  -LS,CM,LS2     Patient/Family Observations  -- family present during PT session   -LS,CM,LS2     Care Plan Review  -- evaluation/treatment results reviewed;care plan/treatment goals reviewed;patient/other agree to care plan;risks/benefits reviewed  -LS,CM,LS2     Care Plan Review, Other Participant(s)  -- family  -LS,CM,LS2     Patient Effort good  -CL good  -LS,CM,LS2     Existing Precautions/Restrictions fall  -CL fall  -LS,CM,LS2     Recorded by [CL] Carol Ladd OT 18 4875 [LS,CM,LS2] Zenaida Dai, PT (r)  Leni Aragon, PT Student (t) Zenaida Dai, PT (c) 07/16/18 1511     Row Name 07/16/18 1450 07/16/18 1126          Vital Signs    Pre Systolic BP Rehab --   No tele Rn cleared for tx.   -CL --   No value (consent given from [facility])  -LS,CM,LS2     O2 Delivery Pre Treatment  -- room air  -LS,CM,LS2     O2 Delivery Intra Treatment  -- room air  -LS,CM,LS2     O2 Delivery Post Treatment  -- room air  -LS,CM,LS2     Pre Patient Position  -- Supine  -LS,CM,LS2     Intra Patient Position  -- Standing  -LS,CM,LS2     Post Patient Position  -- Sitting  -LS,CM,LS2     Recorded by [CL] Carol Ladd OT 07/16/18 1519 [LS,CM,LS2] Zenaida Dai, PT (r) Leni Aragon, PT Student (t) Zenaida Dai, PT (c) 07/16/18 1511     Row Name 07/16/18 1126             Cognitive Assessment/Intervention    Additional Documentation Cognitive Assessment/Intervention (Group)  -LS,CM,LS2      Recorded by [LS,CM,LS2] Zenaida Dia, PT (r) Leni Aragon, PT Student (t) Zenaida Dai, PT (c) 07/16/18 1511      Row Name 07/16/18 1450 07/16/18 1126          Cognitive Assessment/Intervention- PT/OT    Affect/Mental Status (Cognitive) WFL  -CL WNL  -LS,CM,LS2     Orientation Status (Cognition) oriented x 4  -CL oriented x 4  -LS,CM,LS2     Follows Commands (Cognition) WFL  -CL WFL  -LS,CM,LS2     Cognitive Function (Cognitive) safety deficit  -CL safety deficit  -LS,CM,LS2     Safety Deficit (Cognitive) mild deficit;awareness of need for assistance;safety precautions awareness  -CL mild deficit;safety precautions awareness  -LS,CM,LS2     Personal Safety Interventions fall prevention program maintained;gait belt;nonskid shoes/slippers when out of bed  -CL fall prevention program maintained;gait belt;nonskid shoes/slippers when out of bed  -LS,CM,LS2     Recorded by [CL] Carol Ladd OT 07/16/18 1519 [LS,CM,LS2] Zenaida Dai, PT (r) Leni Aragon, KAREN Student (t) Zenaida Dai, PT (c) 07/16/18 1511     Row Name 07/16/18 1450 07/16/18 1126        "   Bed Mobility Assessment/Treatment    Bed Mobility Assessment/Treatment  -- supine-sit  -LS,CM,LS2     Supine-Sit [place] (Bed Mobility)  -- minimum assist (75% patient effort);verbal cues;1 person assist  -LS,CM,LS2     Bed Mobility, Safety Issues  -- decreased use of legs for bridging/pushing  -LS,CM,LS2     Assistive Device (Bed Mobility)  -- bed rails;head of bed elevated  -LS,CM,LS2     Comment (Bed Mobility) [facility].   -CL --   min A for BLE's   -LS,CM,LS2     Recorded by [CL] Carol Ladd, OT 07/16/18 1519 [LS,CM,LS2] Zenaida Dai, PT (r) Leni Aragon, PT Student (t) Zenaida Dai, PT (c) 07/16/18 1511     Row Name 07/16/18 1450             Functional Mobility    Functional Mobility- Ind. Level supervision required  -CL      Functional Mobility- Device other (see comments)   w/c  -CL      Functional Mobility-Distance (Feet) --   in hallway  -CL      Functional Mobility- Comment Pt demo self-propeling w/c w/  no assist.   -CL      Recorded by [CL] Carol Ladd, VIRI 07/16/18 1519      Row Name 07/16/18 1450 07/16/18 1126          Transfer Assessment/Treatment    Transfer Assessment/Treatment sit-stand transfer;stand-sit transfer;wheelchair transfer  -CL sit-stand transfer;stand-sit transfer;bed-chair transfer  -LS,CM,LS2     Comment (Transfers) Pt stood from recliner and performed SPT to the w/c w/ VCs for HP/sequencing.   -CL performed x5 STS going half way through ROM using FWW and min A; performed x7 STS through full ROM, min A --> VC's and tactile cues for upright posture. Bed ---> chair transfer req'ing mod A x2 for wt shif tand moving BLE\"s   -LS,CM,LS2     Recorded by [CL] Carol Ladd, OT 07/16/18 1519 [LS,CM,LS2] Zenaida Dai, PT (r) Leni Aragon, PT Student (t) Zenaida Dai, PT (c) 07/16/18 1511     Row Name 07/16/18 1126             Bed-Chair Transfer    Bed-Chair [place] (Transfers) moderate assist (50% patient effort);2 person assist;verbal cues  -LS,CM,LS2      Assistive Device " (Bed-Chair Transfers) walker, front-wheeled  -LS,CM,LS2      Recorded by [LS,CM,LS2] Zenaida Dai, PT (r) Leni Aragon, PT Student (t) Zenaida Dai, PT (c) 07/16/18 1511      Row Name 07/16/18 1450 07/16/18 1126          Sit-Stand Transfer    Sit-Stand [place] (Transfers) minimum assist (75% patient effort);verbal cues  -CL minimum assist (75% patient effort);1 person assist;verbal cues  -LS,CM,LS2     Assistive Device (Sit-Stand Transfers)  -- walker, front-wheeled  -LS,CM,LS2     Recorded by [CL] Carol Ladd OT 07/16/18 1519 [LS,CM,LS2] Zenaida Dai, PT (r) Leni Aragon, PT Student (t) Zenaida Dai, PT (c) 07/16/18 1511     Row Name 07/16/18 1450 07/16/18 1126          Stand-Sit Transfer    Stand-Sit [place] (Transfers) minimum assist (75% patient effort);verbal cues  -CL minimum assist (75% patient effort);1 person assist;verbal cues  -LS,CM,LS2     Assistive Device (Stand-Sit Transfers)  -- walker, front-wheeled  -LS,CM,LS2     Recorded by [CL] Carol Ladd, OT 07/16/18 1519 [LS,CM,LS2] Zenaida Dai, PT (r) Leni Aragon, PT Student (t) Zenaida Dai, PT (c) 07/16/18 1511     Row Name 07/16/18 1450             Wheelchair Transfer    Type (Wheelchair Transfer) stand pivot/stand step  -CL      [place] Level (Wheelchair Transfer) minimum assist (75% patient effort);verbal cues  -CL      Recorded by [CL] Carol Ladd OT 07/16/18 1519      Row Name 07/16/18 1126             Gait/Stairs Assessment/Training    [place] Level (Gait) unable to assess  -LS,CM,LS2      Comment (Gait/Stairs) will assess at appropriate time   -LS,CM,LS2      Recorded by [LS,CM,LS2] Zenaida Dai, PT (r) Leni Aragon, PT Student (t) Zenaida Dai, PT (c) 07/16/18 1511      Row Name 07/16/18 1450             ADL Assessment/Intervention    BADL Assessment/Intervention --   Pt declined further activity to spend time w/ daughter  -CL      Recorded by [CL] Carol Ladd, OT 07/16/18 1519      Row Name 07/16/18  1126             Motor Skills Assessment/Interventions    Additional Documentation Balance (Group);Therapeutic Exercise (Group)  -LS,CM,LS2      Recorded by [LS,CM,LS2] Zenaida Dai, PT (r) Leni Aragon, PT Student (t) Zenaida Dai, PT (c) 07/16/18 1511      Row Name 07/16/18 1450 07/16/18 1126          Therapeutic Exercise    Therapeutic Exercise  -- supine, lower extremities  -LS,CM,LS2     Additional Documentation  -- Therapeutic Exercise (Row)  -LS,CM,LS2     02501 - PT Therapeutic Exercise Minutes  -- 5  -LS,CM,LS2     61173 - PT Therapeutic Activity Minutes  -- 18  -LS,CM,LS2     97586 - OT Therapeutic Activity Minutes 10  -CL  --     Recorded by [CL] Carol Ladd OT 07/16/18 1519 [LS,CM,LS2] Zenaida Dai, PT (r) Leni Aragon, PT Student (t) Zenaida Dai, PT (c) 07/16/18 1511     Row Name 07/16/18 1126             Lower Extremity Supine Therapeutic Exercise    Performed, Supine Lower Extremity (Therapeutic Exercise) ankle pumps;SLR (straight leg raise);heel slides  -LS,CM,LS2      Exercise Type, Supine Lower Extremity (Therapeutic Exercise) PROM (passive range of motion)  -LS,CM,LS2      Sets/Reps Detail, Supine Lower Extremity (Therapeutic Exercise) 1/10 BLE's   -LS,CM,LS2      Recorded by [LS,CM,LS2] Zenaida Dai, PT (r) Leni Aragon, PT Student (t) Zenaida Dai, PT (c) 07/16/18 1511      Row Name 07/16/18 1126             Balance    Balance static standing balance;static sitting balance  -LS,CM,LS2      Recorded by [LS,CM,LS2] Zenaida Dai, PT (r) Leni Aragon, PT Student (t) Zenaida Dai, PT (c) 07/16/18 1511      Row Name 07/16/18 1450 07/16/18 1126          Static Sitting Balance    Level of [place] (Unsupported Sitting, Static Balance) supervision  -CL supervision  -LS,CM,LS2     Sitting Position (Unsupported Sitting, Static Balance) sitting in chair  -CL sitting on edge of bed  -LS,CM,LS2     Recorded by [CL] Carol Ladd, OT 07/16/18 1519 [LS,CM,LS2] Zenaida Dai, PT (r)  Leni Aragon, PT Student (t) Zenaida Dai, PT (c) 07/16/18 1511     Row Name 07/16/18 1450 07/16/18 1126          Static Standing Balance    Level of [place] (Supported Standing, Static Balance) contact guard assist  -CL contact guard assist  -LS,CM,LS2     Assistive Device Utilized (Supported Standing, Static Balance)  -- rolling walker  -LS,CM,LS2     Recorded by [CL] Carol Ladd OT 07/16/18 1519 [LS,CM,LS2] Zenaida Dai, PT (r) Leni Aragon, PT Student (t) Zenaida Dai, PT (c) 07/16/18 1511     Row Name 07/16/18 1450 07/16/18 1126          Positioning and Restraints    Pre-Treatment Position sitting in chair/recliner  -CL in bed  -LS,CM,LS2     Post Treatment Position wheelchair  -CL chair  -LS,CM,LS2     In Chair  -- reclined;exit alarm on;with family/caregiver;call light within reach;waffle cushion;legs elevated  -LS,CM,LS2     In Wheelchair notified nsg;reclined;call light within reach;encouraged to call for assist;with family/caregiver;on mechanical lift sling;legs elevated   RN cleared for pt to self-propel in hallway w/ family  -CL  --     Recorded by [CL] Carol Ladd, OT 07/16/18 1519 [LS,CM,LS2] Zenaida Dai, PT (r) Leni Aragon, PT Student (t) Zenaida Dai, PT (c) 07/16/18 1511     Row Name 07/16/18 1126             Pain Assessment    Additional Documentation Pain Scale: Numbers Pre/Post-Treatment (Group)  -LS,CM,LS2      Recorded by [LS,CM,LS2] Zenaida Dai, PT (r) Leni Aragon, PT Student (t) Zenaida Dai, PT (c) 07/16/18 1511      Row Name 07/16/18 1450 07/16/18 1126          Pain Scale: Numbers Pre/Post-Treatment    Pain Scale: Numbers, Pretreatment 0/10 - no pain  -CL 0/10 - no pain  -LS,CM,LS2     Pain Scale: Numbers, Post-Treatment 0/10 - no pain  -CL 0/10 - no pain  -LS,CM,LS2     Recorded by [CL] Carol Ladd, OT 07/16/18 1519 [LS,CM,LS2] Zenaida Dai, PT (r) Leni Aragon, PT Student (t) Zenaida Dai, PT (c) 07/16/18 1511     Row Name                Wound  07/13/18 0638 Left upper thigh abrasion    Wound - Properties Group Date first assessed: 07/13/18 [KR] Time first assessed: 0638 [KR] Present On Admission : yes [KR] Side: Left [KR] Orientation: upper [KR] Location: thigh [KR] Type: abrasion [KR] Recorded by:  [KR] Joanne Soares RN 07/13/18 0638    Row Name 07/16/18 1126             Coping    Observed Emotional State accepting;calm;cooperative;quiet  -LS,CM,LS2      Verbalized Emotional State acceptance  -LS,CM,LS2      Recorded by [LS,CM,LS2] Zenaida Dai, PT (r) Leni Aragon, PT Student (t) Zenaida Dai, PT (c) 07/16/18 1511      Row Name 07/16/18 1126             Plan of Care Review    Plan of Care Reviewed With patient  -LS,CM,LS2      Recorded by [LS,CM,LS2] Zenaida Dai, PT (r) Leni Aragon, PT Student (t) Zenaida Dai, PT (c) 07/16/18 1511      Row Name 07/16/18 1126             Outcome Summary/Treatment Plan (PT)    Daily Summary of Progress (PT) progress toward functional goals is good  -LS,CM,LS2      Recorded by [LS,CM,LS2] Zenaida Dai, PT (r) Leni Aragon, PT Student (t) Zenaida Dai, PT (c) 07/16/18 1511        User Key  (r) = Recorded By, (t) = Taken By, (c) = Cosigned By    Initials Name Effective Dates Discipline    LS Zenaida Dai, PT 06/19/15 -  PT    UBALDO Soares RN 06/16/16 -  Nurse    CRYSTAL Ladd, OT 04/03/18 -  OT    SYL Aragon, PT Student 06/07/18 -  PT        Wound 07/13/18 0638 Left upper thigh abrasion (Active)   Dressing Appearance dry;open to air 7/16/2018  8:00 AM   Periwound intact 7/16/2018  8:00 AM         Occupational Therapy Education     Title: PT OT SLP Therapies (Active)     Topic: Occupational Therapy (Active)     Point: ADL training (Done)     Description: Instruct learner(s) on proper safety adaptation and remediation techniques during self care or transfers.   Instruct in proper use of assistive devices.   Learning Progress Summary     Learner Status Readiness Method Response Comment  Documented by    Patient Done Acceptance VETO ESPOSITO,NR role OT, reason for consult, noted deficits, leg  use, bed mobility,goal setting.  07/13/18 0954          Point: Home exercise program (Active)     Description: Instruct learner(s) on appropriate technique for monitoring, assisting and/or progressing therapeutic exercises/activities.   Learning Progress Summary     Learner Status Readiness Method Response Comment Documented by    Patient Active Acceptance E NR Educated pt regarding theraband HEP  07/15/18 5229     Done Acceptance VETO ESPOSITO,NR role OT, reason for consult, noted deficits, leg  use, bed mobility,goal setting.  07/13/18 0954          Point: Precautions (Done)     Description: Instruct learner(s) on prescribed precautions during self-care and functional transfers.   Learning Progress Summary     Learner Status Readiness Method Response Comment Documented by    Patient Done Acceptance VETO ESPOSITO,DAIJA role OT, reason for consult, noted deficits, leg  use, bed mobility,goal setting.  07/13/18 0954                      User Key     Initials Effective Dates Name Provider Type Discipline     06/08/18 -  Beatriz Paris, OT Occupational Therapist OT     06/22/15 -  Abigail Godoy, OT Occupational Therapist OT                OT Recommendation and Plan     Plan of Care Review  Plan of Care Reviewed With: patient  Plan of Care Reviewed With: patient        Outcome Measures     Row Name 07/16/18 1450 07/16/18 1126 07/15/18 1516       How much help from another person do you currently need...    Turning from your back to your side while in flat bed without using bedrails?  -- 3  -LS (r) CM (t) LS (c)  --    Moving from lying on back to sitting on the side of a flat bed without bedrails?  -- 3  -LS (r) CM (t) LS (c)  --    Moving to and from a bed to a chair (including a wheelchair)?  -- 2  -LS (r) CM (t) LS (c)  --    Standing up from a chair using your arms (e.g., wheelchair, bedside chair)?   -- 3  -LS (r) CM (t) LS (c)  --    Climbing 3-5 steps with a railing?  -- 1  -LS (r) CM (t) LS (c)  --    To walk in hospital room?  -- 1  -LS (r) CM (t) LS (c)  --    AM-PAC 6 Clicks Score  -- 13  -LS (r) CM (t)  --       How much help from another is currently needed...    Putting on and taking off regular lower body clothing? 2  -CL  -- 2  -JR    Bathing (including washing, rinsing, and drying) 2  -CL  -- 2  -JR    Toileting (which includes using toilet bed pan or urinal) 2  -CL  -- 2  -JR    Putting on and taking off regular upper body clothing 3  -CL  -- 3  -JR    Taking care of personal grooming (such as brushing teeth) 4  -CL  -- 4  -JR    Eating meals 4  -CL  -- 4  -JR    Score 17  -CL  -- 17  -JR       Functional Assessment    Outcome Measure Options AM-PAC 6 Clicks Daily Activity (OT)  -CL AM-PAC 6 Clicks Basic Mobility (PT)  -LS (r) CM (t) LS (c) AM-PAC 6 Clicks Daily Activity (OT)  -JR      User Key  (r) = Recorded By, (t) = Taken By, (c) = Cosigned By    Initials Name Provider Type    JR Abigail Godoy, OT Occupational Therapist    LS Zenaida Dai, PT Physical Therapist    CL Carol Ladd, OT Occupational Therapist    CM Leni Aragon, PT Student PT Student           Time Calculation:         Time Calculation- OT     Row Name 07/16/18 1520 07/16/18 1450          Time Calculation- OT    OT Start Time 1450  -CL  --     OT Received On 07/16/18  -CL  --     OT Goal Re-Cert Due Date 07/23/18  -CL  --        Timed Charges    97764 - OT Therapeutic Activity Minutes  -- 10  -CL       User Key  (r) = Recorded By, (t) = Taken By, (c) = Cosigned By    Initials Name Provider Type    CRYSTAL Ladd, OT Occupational Therapist           Therapy Suggested Charges     Code   Minutes Charges    75544 (CPT®) Hc Ot Neuromusc Re Education Ea 15 Min      87153 (CPT®) Hc Ot Ther Proc Ea 15 Min      56246 (CPT®) Hc Ot Therapeutic Act Ea 15 Min 10 1    26933 (CPT®) Hc Ot Manual Therapy Ea 15 Min      15515 (CPT®) Hc Ot  Iontophoresis Ea 15 Min      61108 (CPT®) Hc Ot Elec Stim Ea-Per 15 Min      87900 (CPT®) Hc Ot Ultrasound Ea 15 Min      77595 (CPT®) Hc Ot Self Care/Mgmt/Train Ea 15 Min      Total  10 1        Therapy Charges for Today     Code Description Service Date Service Provider Modifiers Qty    [id number] HC OT THERAPEUTIC ACT EA 15 MIN 7/16/2018 Carol Ladd OT GO 1               Carol Ladd OT  7/16/2018

## 2018-07-16 NOTE — PLAN OF CARE
Problem: Patient Care Overview  Goal: Plan of Care Review  Outcome: Ongoing (interventions implemented as appropriate)   07/16/18 1519   Coping/Psychosocial   Plan of Care Reviewed With patient   Patient Care Overview   IRF Plan of Care Review progress ongoing, continue   Progress, Functional Goals demonstrating adequate progress   OTHER   Outcome Summary Pt demo improved independence by performing STS and SPT w/ Min Ax1. Pt demo self-propelling w/c w/ Supervision. Recommend cont skilled IPOT POC.

## 2018-07-16 NOTE — PROGRESS NOTES
Continued Stay Note  [facility]     Patient Name: Isidra Solano  MRN: [id number]  Today's Date: 7/16/2018    Admit Date: 7/12/2018          Discharge Plan     Row Name 07/16/18 6040       Plan    Plan Rehab    Patient/Family in Agreement with Plan yes    Plan Comments Spoke with pt at bedside about rehab. Pt reports she is agreeable and would like to go to a place close to home and would like a referral to [facility]. SW faxed referral to [facility].              Discharge Codes    No documentation.           TRISTAN Johnson

## 2018-07-16 NOTE — PLAN OF CARE
Problem: Patient Care Overview  Goal: Plan of Care Review  Outcome: Ongoing (interventions implemented as appropriate)   07/16/18 1126   Coping/Psychosocial   Plan of Care Reviewed With patient   OTHER   Outcome Summary Pt progressed in independence when performing STS transfers and bed ---> chair transfer. Pt req'd mod A for wt shift and moving BLE's. Pt limited from fatigue and decreased propriception and active movement in BLEs. Pt would benefit from cont'd skilled PT services.    Plan of Care Review   Progress improving

## 2018-07-16 NOTE — THERAPY TREATMENT NOTE
Acute Care - Physical Therapy Treatment Note  [facility]     Patient Name: Isidra Solano  : 1995  MRN: [id number]  Today's Date: 2018  Onset of Illness/Injury or Date of Surgery: 18  Date of Referral to PT: 18  Referring Physician: ANNA Ch    Admit Date: 2018    Visit Dx:    ICD-10-CM ICD-9-CM   1. 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 Impaired mobility and ADLs Z74.09 799.89   3. Impaired functional mobility, balance, gait, and endurance Z74.09 V49.89     Patient Active Problem List   Diagnosis   • Chronic migraine without aura without status migrainosus, not intractable   • Chronic daily headache   • Pregnancy   • Postpartum care following vaginal delivery   • Anxiety and depression   • Asthma   • Lower extremity weakness   • Guillain Barré syndrome (CMS/HCC)       Therapy Treatment          Rehabilitation Treatment Summary     Row Name 18 1126             Treatment Time/Intention    Discipline (P)  physical therapist  -CM      Document Type (P)  therapy note (daily note)  -CM      Subjective Information (P)  no complaints  -CM      Mode of Treatment (P)  physical therapy  -CM      Patient/Family Observations (P)  family present during PT session   -CM      Care Plan Review (P)  evaluation/treatment results reviewed;care plan/treatment goals reviewed;patient/other agree to care plan;risks/benefits reviewed  -CM      Care Plan Review, Other Participant(s) (P)  family  -CM      Patient Effort (P)  good  -CM      Existing Precautions/Restrictions (P)  fall  -CM      Recorded by [CM] Leni Aragon, PT Student 18 1216      Row Name 18 1126             Vital Signs    Pre Systolic BP Rehab (P)  --   No value (consent given from nsg)  -CM      O2 Delivery Pre Treatment (P)  room air  -CM      O2 Delivery Intra Treatment (P)  room air  -CM      O2 Delivery Post Treatment (P)  room air  -CM      Pre Patient Position (P)  Supine  -CM      Intra Patient  "Position (P)  Standing  -CM      Post Patient Position (P)  Sitting  -CM      Recorded by [CM] Leni Aragon, PT Student 07/16/18 1216      Row Name 07/16/18 1126             Cognitive Assessment/Intervention    Additional Documentation (P)  Cognitive Assessment/Intervention (Group)  -CM      Recorded by [CM] Leni Aragon, PT Student 07/16/18 1216      Row Name 07/16/18 1126             Cognitive Assessment/Intervention- PT/OT    Affect/Mental Status (Cognitive) (P)  WNL  -CM      Orientation Status (Cognition) (P)  oriented x 4  -CM      Follows Commands (Cognition) (P)  WFL  -CM      Cognitive Function (Cognitive) (P)  safety deficit  -CM      Safety Deficit (Cognitive) (P)  mild deficit;safety precautions awareness  -CM      Personal Safety Interventions (P)  fall prevention program maintained;gait belt;nonskid shoes/slippers when out of bed  -CM      Recorded by [CM] Leni Aragon, PT Student 07/16/18 1218      Row Name 07/16/18 1126             Bed Mobility Assessment/Treatment    Bed Mobility Assessment/Treatment (P)  supine-sit  -CM      Supine-Sit [place] (Bed Mobility) (P)  minimum assist (75% patient effort);verbal cues;1 person assist  -CM      Bed Mobility, Safety Issues (P)  decreased use of legs for bridging/pushing  -CM      Assistive Device (Bed Mobility) (P)  bed rails;head of bed elevated  -CM      Comment (Bed Mobility) (P)  --   min A for BLE's   -CM      Recorded by [CM] Leni Aragon, PT Student 07/16/18 1218      Row Name 07/16/18 1126             Transfer Assessment/Treatment    Transfer Assessment/Treatment (P)  sit-stand transfer;stand-sit transfer;bed-chair transfer  -CM      Comment (Transfers) (P)  performed x5 STS going half way through ROM using FWW and min A; performed x7 STS through full ROM, min A --> VC's and tactile cues for upright posture. Bed ---> chair transfer req'ing mod A x2 for wt shif tand moving BLE\"s   -CM2      Recorded by [CM] Leni Aragon, PT " Student 07/16/18 1218  [CM2] Leni Aragon, PT Student 07/16/18 1223      Row Name 07/16/18 1126             Bed-Chair Transfer    Bed-Chair [place] (Transfers) (P)  moderate assist (50% patient effort);2 person assist;verbal cues  -CM      Assistive Device (Bed-Chair Transfers) (P)  walker, front-wheeled  -CM      Recorded by [CM] Leni Aragon, PT Student 07/16/18 1219      Row Name 07/16/18 1126             Sit-Stand Transfer    Sit-Stand [place] (Transfers) (P)  minimum assist (75% patient effort);1 person assist;verbal cues  -CM      Assistive Device (Sit-Stand Transfers) (P)  walker, front-wheeled  -CM      Recorded by [CM] Leni Aragon, PT Student 07/16/18 1219      Row Name 07/16/18 1126             Stand-Sit Transfer    Stand-Sit [place] (Transfers) (P)  minimum assist (75% patient effort);1 person assist;verbal cues  -CM      Assistive Device (Stand-Sit Transfers) (P)  walker, front-wheeled  -CM2      Recorded by [CM] Leni Aragon, PT Student 07/16/18 1219  [CM2] Leni Aragon, PT Student 07/16/18 1220      Row Name 07/16/18 1126             Gait/Stairs Assessment/Training    [place] Level (Gait) (P)  unable to assess  -CM      Comment (Gait/Stairs) (P)  will assess at appropriate time   -CM      Recorded by [CM] Leni Aragon, PT Student 07/16/18 1219      Row Name 07/16/18 1126             Motor Skills Assessment/Interventions    Additional Documentation (P)  Balance (Group);Therapeutic Exercise (Group)  -CM      Recorded by [CM] Leni Aragon, PT Student 07/16/18 1219      Row Name 07/16/18 1126             Therapeutic Exercise    Therapeutic Exercise (P)  supine, lower extremities  -CM      Additional Documentation (P)  Therapeutic Exercise (Row)  -CM      12123 - PT Therapeutic Exercise Minutes (P)  5  -CM      14872 - PT Therapeutic Activity Minutes (P)  18  -CM      Recorded by [CM] Leni Aragon, PT Student 07/16/18 1219      Row Name 07/16/18 1126              Lower Extremity Supine Therapeutic Exercise    Performed, Supine Lower Extremity (Therapeutic Exercise) (P)  ankle pumps;SLR (straight leg raise);heel slides  -CM      Exercise Type, Supine Lower Extremity (Therapeutic Exercise) (P)  PROM (passive range of motion)  -CM      Sets/Reps Detail, Supine Lower Extremity (Therapeutic Exercise) (P)  1/10 BLE's   -CM      Recorded by [CM] Leni Aragon, PT Student 07/16/18 1220      Row Name 07/16/18 1126             Balance    Balance (P)  static standing balance;static sitting balance  -CM      Recorded by [CM] Leni Aragon, PT Student 07/16/18 1220      Row Name 07/16/18 1126             Static Sitting Balance    Level of [place] (Unsupported Sitting, Static Balance) (P)  supervision  -CM      Sitting Position (Unsupported Sitting, Static Balance) (P)  sitting on edge of bed  -CM      Recorded by [CM] Leni Aragon, PT Student 07/16/18 1223      Row Name 07/16/18 1126             Static Standing Balance    Level of [place] (Supported Standing, Static Balance) (P)  contact guard assist  -CM      Assistive Device Utilized (Supported Standing, Static Balance) (P)  rolling walker  -CM      Recorded by [CM] Leni Aragon, PT Student 07/16/18 1223      Row Name 07/16/18 1126             Positioning and Restraints    Pre-Treatment Position (P)  in bed  -CM      Post Treatment Position (P)  chair  -CM      In Chair (P)  reclined;exit alarm on;with family/caregiver;call light within reach;waffle cushion;legs elevated  -CM      Recorded by [CM] Leni Aragon, PT Student 07/16/18 1223      Row Name 07/16/18 1126             Pain Assessment    Additional Documentation (P)  Pain Scale: Numbers Pre/Post-Treatment (Group)  -CM      Recorded by [CM] Leni Aragon, PT Student 07/16/18 1223      Row Name 07/16/18 1126             Pain Scale: Numbers Pre/Post-Treatment    Pain Scale: Numbers, Pretreatment (P)  0/10 - no pain  -CM      Pain Scale:  Numbers, Post-Treatment (P)  0/10 - no pain  -CM      Recorded by [CM] Leni Aragon, PT Student 07/16/18 1224      Row Name                Wound 07/13/18 0638 Left upper thigh abrasion    Wound - Properties Group Date first assessed: 07/13/18 [KR] Time first assessed: 0638 [KR] Present On Admission : yes [KR] Side: Left [KR] Orientation: upper [KR] Location: thigh [KR] Type: abrasion [KR] Recorded by:  [KR] Joanne Soares RN 07/13/18 0638    Row Name 07/16/18 1126             Coping    Observed Emotional State (P)  accepting;calm;cooperative;quiet  -CM      Verbalized Emotional State (P)  acceptance  -CM      Recorded by [CM] Leni Aragon, PT Student 07/16/18 1224      Row Name 07/16/18 1126             Plan of Care Review    Plan of Care Reviewed With (P)  patient  -CM      Recorded by [CM] Leni Aragon PT Student 07/16/18 1224      Row Name 07/16/18 1126             Outcome Summary/Treatment Plan (PT)    Daily Summary of Progress (PT) (P)  progress toward functional goals is good  -CM      Recorded by [CM] Leni Aragon, PT Student 07/16/18 1224        User Key  (r) = Recorded By, (t) = Taken By, (c) = Cosigned By    Initials Name Effective Dates Discipline    KR Joanne Soares RN 06/16/16 -  Nurse    CM Leni Aragon, PT Student 06/07/18 -  PT          Wound 07/13/18 0638 Left upper thigh abrasion (Active)   Dressing Appearance dry;open to air 7/16/2018  8:00 AM   Periwound intact 7/16/2018  8:00 AM             Physical Therapy Education     Title: PT OT SLP Therapies (Active)     Topic: Physical Therapy (Active)     Point: Mobility training (Active)    Learning Progress Summary     Learner Status Readiness Method Response Comment Documented by    Patient Active Acceptance E NR  CM 07/16/18 1126     Active Acceptance E,D NR  LS 07/13/18 1511    Significant Other Active Acceptance E,D NR  LS 07/13/18 1511          Point: Home exercise program (Active)    Learning Progress Summary     Learner  "Status Readiness Method Response Comment Documented by    Patient Active Acceptance E NR  CM 07/16/18 1126     Active Acceptance E,D NR  LS 07/13/18 1511    Significant Other Active Acceptance E,D NR  LS 07/13/18 1511          Point: Body mechanics (Active)    Learning Progress Summary     Learner Status Readiness Method Response Comment Documented by    Patient Active Acceptance E NR  CM 07/16/18 1126     Active Acceptance E,D NR  LS 07/13/18 1511    Significant Other Active Acceptance E,D NR  LS 07/13/18 1511          Point: Precautions (Active)    Learning Progress Summary     Learner Status Readiness Method Response Comment Documented by    Patient Active Acceptance E NR  CM 07/16/18 1126     Active Acceptance E,D NR  LS 07/13/18 1511    Significant Other Active Acceptance E,D NR  LS 07/13/18 1511                      User Key     Initials Effective Dates Name Provider Type Discipline     06/19/15 -  Zenaida Dai, PT Physical Therapist PT     06/07/18 -  Leni Aragon, PT Student PT Student PT                    PT Recommendation and Plan     Outcome Summary/Treatment Plan (PT)  Daily Summary of Progress (PT): (P) progress toward functional goals is good  Plan of Care Reviewed With: (P) patient  Progress: (P) improving  Outcome Summary: (P) Pt progressed in independence when performing STS transfers and bed ---> chair transfer. Pt req'd mod A  for wt shift and  moving BLE's. Pt limited from fatigue and decreased propriception and active movement in BLE\"s. Pt would benefit from cont'd skilled PT services.           Outcome Measures     Row Name 07/16/18 1126 07/15/18 1516          How much help from another person do you currently need...    Turning from your back to your side while in flat bed without using bedrails? (P)  3  -CM  --     Moving from lying on back to sitting on the side of a flat bed without bedrails? (P)  3  -CM  --     Moving to and from a bed to a chair (including a wheelchair)? (P)  2  " -CM  --     Standing up from a chair using your arms (e.g., wheelchair, bedside chair)? (P)  3  -CM  --     Climbing 3-5 steps with a railing? (P)  1  -CM  --     To walk in hospital room? (P)  1  -CM  --     AM-PAC 6 Clicks Score (P)  13  -CM  --        How much help from another is currently needed...    Putting on and taking off regular lower body clothing?  -- 2  -JR     Bathing (including washing, rinsing, and drying)  -- 2  -JR     Toileting (which includes using toilet bed pan or urinal)  -- 2  -JR     Putting on and taking off regular upper body clothing  -- 3  -JR     Taking care of personal grooming (such as brushing teeth)  -- 4  -JR     Eating meals  -- 4  -JR     Score  -- 17  -JR        Functional Assessment    Outcome Measure Options (P)  AM-PAC 6 Clicks Basic Mobility (PT)  -CM AM-PAC 6 Clicks Daily Activity (OT)  -JR       User Key  (r) = Recorded By, (t) = Taken By, (c) = Cosigned By    Initials Name Provider Type    JR Abigail Godoy, OT Occupational Therapist    CM Leni Aragon, PT Student PT Student           Time Calculation:         PT Charges     Row Name 07/16/18 1126             Time Calculation    Start Time (P)  1126  -CM      PT Received On (P)  07/16/18  -CM      PT Goal Re-Cert Due Date (P)  07/23/18  -CM         Time Calculation- PT    Total Timed Code Minutes- PT (P)  23 minute(s)  -CM         Timed Charges    81676 - PT Therapeutic Exercise Minutes (P)  5  -CM      62545 - PT Therapeutic Activity Minutes (P)  18  -CM        User Key  (r) = Recorded By, (t) = Taken By, (c) = Cosigned By    Initials Name Provider Type    SYL Aragon, PT Student PT Student        Therapy Suggested Charges     Code   Minutes Charges    08606 (CPT®) Hc Pt Neuromusc Re Education Ea 15 Min      30798 (CPT®) Hc Pt Ther Proc Ea 15 Min 5 1    10450 (CPT®) Hc Gait Training Ea 15 Min      20910 (CPT®) Hc Pt Therapeutic Act Ea 15 Min 18 1    59239 (CPT®) Hc Pt Manual Therapy Ea 15 Min      55865  (CPT®) Hc Pt Iontophoresis Ea 15 Min      48697 (CPT®) Hc Pt Elec Stim Ea-Per 15 Min      05010 (CPT®) Hc Pt Ultrasound Ea 15 Min      81672 (CPT®) Hc Pt Self Care/Mgmt/Train Ea 15 Min      Total  23 2        Therapy Charges for Today     Code Description Service Date Service Provider Modifiers Qty    16423599252 HC PT THER PROC EA 15 MIN 7/16/2018 Leni Aragon, PT Student GP 1    11390594520 HC PT THERAPEUTIC ACT EA 15 MIN 7/16/2018 Leni Aragon, PT Student GP 1          PT G-Codes  Outcome Measure Options: (P) AM-PAC 6 Clicks Basic Mobility (PT)  Functional Limitation: Mobility: Walking and moving around  Mobility: Walking and Moving Around Current Status (): At least 60 percent but less than 80 percent impaired, limited or restricted  Mobility: Walking and Moving Around Goal Status (): At least 40 percent but less than 60 percent impaired, limited or restricted    Leni Aragon, PT Student  7/16/2018

## 2018-07-16 NOTE — PAYOR COMM NOTE
"Isidra Curry (23 y.o. Female)     Date of Birth Social Security Number Address Home Phone MRN    1995  [address]   [facility] [id number] [phone] [id number]    Church Marital Status          None        Admission Date Admission Type Admitting Provider Attending Provider Department, Room/Bed    18 Emergency Sonia Perez MD Opii, [place], MD [facility] 5B, N532/1    Discharge Date Discharge Disposition Discharge Destination                       Attending Provider:  Sonia Perez MD    Allergies:  No Known Allergies    Isolation:  None   Infection:  None   Code Status:  CPR    Ht:  168.9 cm (66.5\")   Wt:  101 kg (221 lb 9 oz)    Admission Cmt:  None   Principal Problem:  Lower extremity weakness [R29.898]                 Active Insurance as of 2018     Primary Coverage     Payor Plan Insurance Group Employer/Plan Group    [employer] [place] [employer] [place]      Payor Plan Address Payor Plan Phone Number Effective From Effective To    [address] [address] [phone] 6/15/2017     [facility] [id number]       Subscriber Name Subscriber Birth Date Member ID       ISIDRA CURRY 1995 [id number]                 Emergency Contacts      (Rel.) Home Phone Work Phone Mobile Phone    Wilber Basilio (Spouse) [phone] -- --               History & Physical      Sonia Perez MD at 2018  4:18 AM              [facility] Medicine Services  HISTORY AND PHYSICAL    Patient Name: Isidra Curry  : 1995  MRN: [id number]  Primary Care Physician: Evelyne Myrick MD    Subjective   Subjective     Chief Complaint:  Extremity weakness    HPI:  Isidra Curry is a 23 y.o. female with a past medical history significant for asthma, anxiety/depression, and migraines who presents with complaints of bilateral lower extremity weakness since Monday. Patient states she awoke Monday morning with bruises on anterior thighs and " "associated bilateral LE numbness. States numbness then turned into pain and eventually paralysis. She is unable to move lower extremities and has completely lost sensation. Patient notes an incident today during which her father and  had to come help her out of the bathroom as she could no longer ambulate or get up from the commode. Prior to onset of lower extremity weakness, she notes mild viral like illness with nausea. States this lasted about 1-2 days. She was evaluated at two facilities PTA. Labs  and CT imaging unremarkable. Patient's family is frustrated as she has been told that \"this was all in head\". They are certain that this episode is not psychosomatic. Patient denies recent falls or trauma, but notes undergoing epidural 6 months ago during childbirth. No headache, visual changes, cough, congestion, fever, abdominal pain, or N/V/D. No changes in medications, recent vaccinations, or illicit substance abuse. Will admit for further evaluation and treatment.    Emergency Department Evaluation; vitals stable. Labs favorable. UA, HCG negative. MR brain and lumbar spine negative for acute process. LP findings unremarkable.    Review of Systems   Constitutional: Positive for fatigue. Negative for chills and fever.   HENT: Negative for congestion and trouble swallowing.    Eyes: Negative for photophobia and visual disturbance.   Respiratory: Negative for cough and shortness of breath.    Cardiovascular: Negative for chest pain and leg swelling.   Gastrointestinal: Negative for abdominal pain, diarrhea, nausea and vomiting.   Endocrine: Negative for cold intolerance and heat intolerance.   Genitourinary: Negative for flank pain.   Musculoskeletal: Positive for joint swelling.   Skin: Negative for pallor and rash.   Allergic/Immunologic: Negative for immunocompromised state.   Neurological: Positive for weakness and numbness. Negative for seizures, facial asymmetry and headaches.   Hematological: Negative " for adenopathy.   Psychiatric/Behavioral: Negative for agitation and confusion.          Otherwise 10-system ROS reviewed and is negative except as mentioned in the HPI.    Personal History     Past Medical History:   Diagnosis Date   • Anxiety    • Asthma    • Depression    • Migraine        Past Surgical History:   Procedure Laterality Date   • CHOLECYSTECTOMY  03/08/2016       Family History: family history includes Breast cancer in her paternal grandmother; Colon cancer in her maternal grandmother; Coronary artery disease in her father, maternal grandfather, maternal uncle, and paternal aunt; Diabetes in her father, maternal grandfather, maternal uncle, paternal aunt, and paternal grandfather; Hypertension in her father; Mental illness in her maternal uncle; Mental retardation in her paternal uncle; Migraines in her sister; Multiple sclerosis in her sister; Ovarian cancer in her maternal aunt; Seizures in her brother, father, and maternal uncle; Stroke in her maternal grandfather.     Social History:  reports that she has never smoked. She has never used smokeless tobacco. She reports that she does not drink alcohol or use drugs.  Social History     Social History Narrative   • No narrative on file       Medications:    (Not in a hospital admission)    No Known Allergies    Objective   Objective     Vital Signs:   Temp:  [98.8 °F (37.1 °C)] 98.8 °F (37.1 °C)  Heart Rate:  [104] 104  Resp:  [18] 18  BP: (125)/(80) 125/80        Physical Exam   Constitutional: No acute distress, awake, alert  Eyes: PERRLA, sclerae anicteric, no conjunctival injection  HENT: NCAT, mucous membranes moist  Neck: Supple, no thyromegaly, no lymphadenopathy, trachea midline  Respiratory: Clear to auscultation bilaterally, nonlabored respirations   Cardiovascular: RRR, no murmurs, rubs, or gallops, palpable pedal pulses bilaterally  Gastrointestinal: Positive bowel sounds, soft, nontender, nondistended  Musculoskeletal: No bilateral  ankle edema, no clubbing or cyanosis to extremities  Psychiatric: Appropriate affect, cooperative  Neurologic: Oriented x 3, lower extremities weak, without muscle tone. Secondary deficit. Pulses palpable Cranial Nerves grossly intact to confrontation, speech clear  Skin: No rashes      Results Reviewed:  I have personally reviewed current lab, radiology, and data and agree.      Results from last 7 days  Lab Units 07/13/18  0049   WBC 10*3/mm3 7.99   HEMOGLOBIN g/dL 13.3   HEMATOCRIT % 40.1   PLATELETS 10*3/mm3 305       Results from last 7 days  Lab Units 07/13/18  0049   SODIUM mmol/L 139   POTASSIUM mmol/L 3.8   CHLORIDE mmol/L 108   CO2 mmol/L 25.0   BUN mg/dL 8*   CREATININE mg/dL 0.82   GLUCOSE mg/dL 108*   CALCIUM mg/dL 8.3*   ALT (SGPT) U/L 13   AST (SGOT) U/L 13     Estimated Creatinine Clearance: 127.9 mL/min (by C-G formula based on SCr of 0.82 mg/dL).  Brief Urine Lab Results  (Last result in the past 365 days)      Color   Clarity   Blood   Leuk Est   Nitrite   Protein   CREAT   Urine HCG        07/13/18 0110               Negative         No results found for: BNP  Imaging Results (last 24 hours)     Procedure Component Value Units Date/Time    IR Lumbar Puncture Diagnosis [[id number]] Updated:  07/13/18 0332    MRI Brain With & Without Contrast [[id number]] Collected:  07/13/18 0014     Updated:  07/13/18 0326    Narrative:       EXAM:    MR Head Without And With Intravenous Contrast    CLINICAL HISTORY:    23 years old, female; Signs and symptoms; Other: HX of migraines; Patient HX:   Neuro deficits unable to feel and use bilateral lower extremities HX of   migraines 19 ml multihance administered; Additional info: Neuro deficit(s),   subacute. Neuro deficits unable to feel and use bilateral lower extremities HX   of migraines 19 ml multihance administered    TECHNIQUE:    Magnetic resonance images of the head/brain without and with intravenous   contrast in multiple planes.    CONTRAST:    19 mL of  Multihance administered intravenously.      COMPARISON:    No relevant prior studies available.    FINDINGS:    Brain parenchyma demonstrates normal signal intensity and enhancement without   an intra- or extra-axial mass or abnormality. No mass effect or midline shift.   No evidence of restricted diffusion in the supra-or infratentorial brain.      Midline brain structures including the corpus callosum, pituitary gland,   pineal region, and craniovertebral junction are unremarkable. Ventricles and   sulci are normal without evidence of hydrocephalus. Intracranial vessels   demonstrate a normal flow-void.    Impression:         Unremarkable study without a supra-, infratentorial mass or abnormality; no   evidence of hemorrhagic or ischemic infarct and no hydrocephalus.    THIS DOCUMENT HAS BEEN ELECTRONICALLY SIGNED BY MG NEAL MD    MRI Lumbar Spine Without Contrast [[id number]] Collected:  07/13/18 0002     Updated:  07/13/18 0323    Narrative:       EXAM:    MR Lumbar Spine Without Intravenous Contrast    CLINICAL HISTORY:    23 years old, female; Signs and symptoms; Other: Lbp; Patient HX: Lower back   pain unable to feel bilateral lower extremities; Additional info: Low back   pain, rapidly progressive neuro deficit. Lower back pain unable to feel   bilateral lower extremities    TECHNIQUE:    Magnetic resonance images of the lumbar spine without intravenous contrast in   multiple planes.    COMPARISON:    No relevant prior studies available.    FINDINGS:    Normal curvature of the spine, alignment of the vertebral bodies is normal.   Conus ends normally at the level of L1 with normal-appearing cauda equina   without evidence of compression.      Small S1 vertebral body hemangioma. Marrow shows normal signal intensity   without evidence of acute/subacute vertebral compression fracture or deformity.   No evidence of a pars defect or pars fracture.      From T11-T12 to L5-S1 intervertebral discs are normal, no  focal disc   herniation, no significant spinal stenosis or neural foramina narrowing.      Visualized sacrum, iliac bones and sacroiliac joints are unremarkable.   Pre-and paravertebral soft tissues are grossly normal. Visualized aorta is   unremarkable.    Impression:         Normal study, normal appearing cauda equina and conus without evidence of   compression or focal abnormality.    THIS DOCUMENT HAS BEEN ELECTRONICALLY SIGNED BY MG NEAL MD             Assessment/Plan   Assessment / Plan     Hospital Problem List     * (Principal)Lower extremity weakness    Chronic migraine without aura without status migrainosus, not intractable    Anxiety and depression    Asthma              Assessment & Plan:  1. Lower extremity weakness:  - Guillain [place] syndrome suggested, but patient with sensory loss ?. Psychosomatic?. Imaging, labs, LP negative.  - consult to neurology, appreciate input  - PT/OT treat and eval  - repeat labs as needed    2. Asthma:  - stable. PRN duo nebs with additional pulmonary toilet as needed      DVT prophylaxis: mechanical    CODE STATUS:  Full code, full support  Code Status and Medical Interventions:   Ordered at: 07/13/18 0418     Level Of Support Discussed With:    Patient     Code Status:    CPR     Medical Interventions (Level of Support Prior to Arrest):    Full       Admission Status:  I believe this patient meets OBSERVATION status, however if further evaluation or treatment plans warrant, status may change.  Based upon current information, I predict patient's care encounter to be less than or equal to 2 midnights.    Electronically signed by Bright hC PA-C, 07/13/18, 4:18 AM.      Brief Attending Admission Attestation     I have seen and examined the patient, performing an independent face-to-face diagnostic evaluation with plan of care reviewed and developed with the advanced practice clinician (APC).      Brief Summary Statement/HPI:   Isidra Solano is a 23 y.o.  female , otherwise healthy or history of asthma, presents to [facility] with 4-5 days of progressively worsening lower extremity weakness to the point of paralysis, prior to this episode patient does endorse having bilateral bruising on her thighs, denies any trauma.  Patient does endorse having a viral syndrome week prior, otherwise denies any tick bites, denies any nausea or vomiting, no diarrhea.  On procedure to [facility] patient underwent MRI that was unremarkable, status post LP that was also unrevealing she has been admitted to hospitalist medicine service for continued workup.    Attending Physical Exam:  Constitutional: No acute distress, awake, alert  Eyes: PERRLA, sclerae anicteric, no conjunctival injection  HENT: NCAT, mucous membranes moist  Neck: Supple, no thyromegaly, no lymphadenopathy, trachea midline  Respiratory: Clear to auscultation bilaterally, nonlabored respirations   Cardiovascular: RRR, no murmurs, rubs, or gallops, palpable pedal pulses bilaterally  Gastrointestinal: Positive bowel sounds, soft, nontender, nondistended  Musculoskeletal: No bilateral ankle edema, no clubbing or cyanosis to extremities  Psychiatric: Appropriate affect, cooperative  Neurologic: Oriented x 3, no lower extremity weakness, 1/5, normal muscle tone, poor sensation  Skin: No rashes      Brief Assessment/Plan :  - Bilateral worsening bilateral lower extremity paralysis, etiology currently unknown, lower motor neuropathy likely? 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    See above for further detailed assessment and plan developed with APC which I have reviewed and/or edited.    Electronically signed by Sonia Perez MD, 07/13/18, 6:40 AM.             Electronically signed by Sonia Perez MD at 7/13/2018   9:23 AM       Hospital Medications (all)       Dose Frequency Start End    acetaminophen (TYLENOL) tablet 650 mg 650 mg Every 4 Hours PRN 7/13/2018     Sig - Route: Take 2 tablets by mouth Every 4 (Four) Hours As Needed for Mild Pain . - Oral    gadobenate dimeglumine (MULTIHANCE) injection 15 mL 15 mL Once in Imaging 7/13/2018 7/13/2018    Sig - Route: Infuse 15 mL into a venous catheter Once. - Intravenous    gadobenate dimeglumine (MULTIHANCE) injection 20 mL 20 mL Once in Imaging 7/13/2018 7/13/2018    Sig - Route: Infuse 20 mL into a venous catheter Once. - Intravenous    lidocaine (XYLOCAINE) 1 % injection 10 mL 10 mL Once 7/13/2018 7/13/2018    Sig - Route: Inject 10 mL under the skin 1 (One) Time. - Subcutaneous    Cosign for Ordering: Accepted by Juan Pablo Suarez MD on 7/13/2018  7:37 AM    lidocaine PF 1% (XYLOCAINE) injection 5 mL 5 mL Once 7/13/2018     Sig - Route: Inject 5 mL as directed 1 (One) Time. - Injection    Cosign for Ordering: Accepted by Juan Pablo Suarez MD on 7/13/2018  7:37 AM    ondansetron (ZOFRAN) injection 4 mg 4 mg Every 6 Hours PRN 7/16/2018     Sig - Route: Infuse 2 mL into a venous catheter Every 6 (Six) Hours As Needed for Nausea or Vomiting. - Intravenous    oxyCODONE-acetaminophen (PERCOCET) 5-325 MG per tablet 1 tablet 1 tablet Every 4 Hours PRN 7/14/2018 7/24/2018    Sig - Route: Take 1 tablet by mouth Every 4 (Four) Hours As Needed for Moderate Pain . - Oral    sodium chloride 0.9 % flush 1-10 mL 1-10 mL As Needed 7/13/2018     Sig - Route: Infuse 1-10 mL into a venous catheter As Needed for Line Care. - Intravenous    sodium chloride 0.9 % infusion (Discontinued) 75 mL/hr Continuous 7/13/2018 7/13/2018    Sig - Route: Infuse 75 mL/hr into a venous catheter Continuous. - Intravenous    traMADol (ULTRAM) tablet 50 mg (Discontinued) 50 mg Every 6 Hours PRN 7/13/2018 7/14/2018    Sig - Route: Take 1 tablet by mouth Every 6 (Six) Hours As Needed for Moderate Pain . - Oral           Lab Results (last 72 hours)     Procedure Component Value Units Date/Time    Culture, CSF - Cerebrospinal Fluid, Lumbar Puncture [[id number]]  (Normal) Collected:  07/13/18 0317    Specimen:  Cerebrospinal Fluid from Lumbar Puncture Updated:  07/15/18 0754     CSF Culture No growth at 2 days     Gram Stain Result No WBCs or organisms seen    Nuclear Antigen Antibody, IFA [[id number]] Collected:  07/13/18 1501    Specimen:  Blood Updated:  07/13/18 1517    ANCA Panel [[id number]] Collected:  07/13/18 1501    Specimen:  Blood Updated:  07/13/18 1517    Porphyrin, Total [[id number]] Collected:  07/13/18 1501    Specimen:  Blood Updated:  07/13/18 1517    Protein Elec + Interp, Serum [[id number]] Collected:  07/13/18 1501    Specimen:  Blood Updated:  07/13/18 1517          Imaging Results (last 72 hours)     Procedure Component Value Units Date/Time    MRI Thoracic Spine With & Without Contrast [[id number]] Collected:  07/13/18 2147     Updated:  07/13/18 2152    Narrative:       EXAMINATION: MRI THORACIC SPINE W WO CONTRAST-     INDICATION: BLE weakness; G61.0-Kxlfxoko-Vxfiq syndrome; Z74.09-Other  reduced mobility; Z74.09-Other reduced mobility      TECHNIQUE: Multiplanar MRI of the thoracic spine with and without  intravenous contrast administration     COMPARISON: NONE     FINDINGS: Sagittal datasets reveal normal alignment of the thoracic  segments without focal subluxation. Preservation of vertebral body  heights and intervertebral disc height spaces with disc well hydrated.  Facets are well aligned.     Axial dataset evaluation without paraspinal hematoma or paraspinal soft  tissue abnormality of concern. No focal fluid collection is identified  within the paraspinal region.     Axial datasets evaluation of the cord demonstrate normal signal without  cord edema or myelomalacia. No intrathecal mass occupying lesion. Noted  spinal canal stenosis or neuroforaminal stenosis identified.     Postcontrast  administration sequences without abnormal enhancement  identified specifically no abnormal cord enhancement or intrathecal  enhancement of concern. No abnormal bone marrow signal or enhancement  within the osseous structures.          Impression:       No acute pathology of the thoracic spine with normal bone  marrow signal and intrinsic signal of the thoracic cord. No abnormal  enhancement intrathecal or thoracic cord enhancement. No significant  spinal canal or neuroforaminal stenosis.         This report was finalized on 7/13/2018 9:50 PM by Dr. Eliot Esposito.       MRI Cervical Spine With & Without Contrast [[id number]] Collected:  07/13/18 2144     Updated:  07/13/18 2149    Narrative:       EXAMINATION: MRI CERVICAL SPINE W WO CONTRAST-     INDICATION: BLE weakness; G61.0-Joqagvhv-Zdaoj syndrome; Z74.09-Other  reduced mobility; Z74.09-Other reduced mobility      TECHNIQUE: Multiplanar MRI of the cervical spine with and without  intravenous contrast administration     COMPARISON: NONE     FINDINGS:      Sagittal datasets reveal flattening of the normal cervical lordotic  curvature without focal subluxation. Preservation of vertebral body  heights with normal appearance of the bone marrow signal.  Cervicomedullary junction widely patent. Axial datasets evaluation for  paraspinal soft tissue findings without paraspinal soft tissue  abnormality of concern specifically no paraspinal hematoma or fluid  collection.     Axial datasets evaluation for degenerative/spondylitic changes without  significant spinal canal or neuroforaminal stenosis identified within  the cervical levels. Normal signal within the cervical cord. No syrinx  or cord edema identified.     Postcontrast administration sequences without abnormal enhancement  specifically no abnormal enhancing intrathecal mass lesion or abnormal  enhancement within the cervical cord.          Impression:       Mild flattening of the cervical lordotic curvature maps  and  muscular spasm and/or patient positioning. No acute pathology of the  cervical spine otherwise identified specifically no abnormal enhancement  or intrathecal space-occupying lesion. Normal cord signal without  stenosis and spinal canal or neuroforaminal stenosis.     This report was finalized on 2018 9:47 PM by Dr. Eliot Esposito.                Physician Progress Notes (last 72 hours) (Notes from 2018 10:00 AM through 2018 10:00 AM)      Sonia Perez MD at 2018  7:19 AM              [facility] Medicine Services  PROGRESS NOTE    Patient Name: Isidra Solano  : 1995  MRN: [id number]    Date of Admission: 2018  Length of Stay: 1  Primary Care Physician: Evelyne Myrick MD    Subjective   Subjective     CC:BLE weakness    HPI:Patient states that she had some nausea last night, no vomiting, patient continues to have BLE weakness    Review of Systems  Gen- No fevers, chills  CV- No chest pain, palpitations  Resp- No cough, dyspnea  GI- No N/V/D, abd pain    Otherwise ROS is negative except as mentioned in the HPI.    Objective   Objective     Vital Signs:   Temp:  [97.7 °F (36.5 °C)-98.7 °F (37.1 °C)] 97.7 °F (36.5 °C)  Heart Rate:  [75-87] 75  Resp:  [16-18] 16  BP: (107-119)/(58-71) 115/66      Physical Exam:  Constitutional: No acute distress, awake, alert  HENT: NCAT, mucous membranes moist  Respiratory: Clear to auscultation bilaterally, respiratory effort normal   Cardiovascular: RRR, no murmurs, rubs, or gallops, palpable pedal pulses bilaterally  Gastrointestinal: Positive bowel sounds, soft, nontender, nondistended  Musculoskeletal: No bilateral ankle edema  Psychiatric: Appropriate affect, cooperative  Neurologic: Oriented x 3, ble weakness  Skin: No rashes    Results Reviewed:  I have personally reviewed current lab, radiology, and data and agree.      Results from last 7 days  Lab Units 18  0049   WBC 10*3/mm3 7.99   HEMOGLOBIN g/dL  13.3   HEMATOCRIT % 40.1   PLATELETS 10*3/mm3 305       Results from last 7 days  Lab Units 07/13/18  0049   SODIUM mmol/L 139   POTASSIUM mmol/L 3.8   CHLORIDE mmol/L 108   CO2 mmol/L 25.0   BUN mg/dL 8*   CREATININE mg/dL 0.82   GLUCOSE mg/dL 108*   CALCIUM mg/dL 8.3*   ALT (SGPT) U/L 13   AST (SGOT) U/L 13     Estimated Creatinine Clearance: 130.1 mL/min (by C-G formula based on SCr of 0.82 mg/dL).  No results found for: BNP    Microbiology Results Abnormal     Procedure Component Value - Date/Time    Culture, CSF - Cerebrospinal Fluid, Lumbar Puncture [[id number]]  (Normal) Collected:  07/13/18 0317    Lab Status:  Preliminary result Specimen:  Cerebrospinal Fluid from Lumbar Puncture Updated:  07/15/18 0754     CSF Culture No growth at 2 days     Gram Stain Result No WBCs or organisms seen        I have reviewed the medications.    Assessment/Plan   Assessment / Plan     Hospital Problem List     * (Principal)Lower extremity weakness    Chronic migraine without aura without status migrainosus, not intractable    Anxiety and depression    Asthma    Guillain Barré syndrome (CMS/HCC)          Brief Hospital Course to date:  Isidra Solano is a 23 y.o. female  otherwise healthy or history of asthma, presents to [facility] with 4-5 days of progressively worsening lower extremity weakness to the point of paralysis, prior to this episode patient does endorse having bilateral bruising on her thighs, denies any trauma.  Patient does endorse having a viral syndrome week prior     Assessment & Plan:  - Bilateral worsening lower extremity paralysis, etiology currently unknown, lower motor neuropathy likely? Guillain-Barré syndrome suspected but unlikely, neurology consulted their impression is that this is a myelopathic process, metabolic/autoimmune process vs conversion d/o. However thus far MRI L/C/T-spine are unrevealing, csf is normal, f/u autoimmune panels, EMG/NCS planned for today.  - Bilateral spontaneous bruises,  "significance of this in addition to above symptoms is unknown, platelets are wnl  - Hx of asthma, not in exacerbations.  - PT/OT.     DVT Prophylaxis: mechanical    CODE STATUS:   Code Status and Medical Interventions:   Ordered at: 07/13/18 0418     Level Of Support Discussed With:    Patient     Code Status:    CPR     Medical Interventions (Level of Support Prior to Arrest):    Full       Disposition: TBD, anticipate she will need rehab,CM following    Electronically signed by Sonia Perez MD, 07/16/18, 7:22 AM.      Electronically signed by Sonia Perez MD at 7/16/2018  7:22 AM     Wilber Morales MD at 7/15/2018 11:44 AM           LOS: 1 day   Patient Care Team:  Evelyne Myrick MD as PCP - General (Family Medicine)    Chief Complaint: bilateral lower extremity weakness       History of Present Illness    Subjective    Pt reports sensation returned above knees but still cannot feel below knees.    History taken from: patient chart family    Objective     Vital Signs  Temp:  [98 °F (36.7 °C)-98.5 °F (36.9 °C)] 98.5 °F (36.9 °C)  Heart Rate:  [72-83] 83  Resp:  [16-20] 18  BP: (107-116)/(58-69) 107/58    Objective:  General Appearance:  Comfortable.    Vital signs: (most recent): Blood pressure 107/58, pulse 83, temperature 98.5 °F (36.9 °C), temperature source Oral, resp. rate 18, height 168.9 cm (66.5\"), weight 101 kg (221 lb 9 oz), SpO2 98 %, not currently breastfeeding.  Vital signs are normal.    Pupils:  Pupils are equal, round, and reactive to light.          Neurological Exam    Mental Status  The patient is alert and oriented to person, place, time, and situation. Her recent and remote memory are normal.     Cranial Nerves    CN II: The patient's visual acuity and visual fields are normal.  CN III, IV, VI: The patient's pupils are equally round and reactive to light and ocular movements are normal.  CN V: The patient has normal facial sensation  CN VII:  The patient has symmetric facial " movement  CN VIII:  The patient's hearing is normal.  CN IX, X: The patient has symmetric palate movement and normal gag reflex.  CN XI: The patient's shoulder shrug strength is normal.  CN XII: The patient's tongue is midline without atrophy or fasciculations.    Motor                                                Right                   Left  Hip flexion                                2                         2  Hip extension                           2                         2  Hip abduction                           2                         2  Hip adduction                           2                         2  Knee flexion                             2                         2  Knee extension                         2                         2  Ankle inversion                         2                         2  Ankle eversion                          2                         2  Plantarflexion                            2                         2  Dorsiflexion                               2                         2  Toe flexion                                2                         2  Toe extension                           2                         2      Sensory   She has a dermatomal sensory deficit.  Decreased PP and LT from knees down      Reflexes  Deep tendon reflexes are 2+ and symmetric in all four extremities with downgoing toes bilaterally.    Gait and Coordination   She has abnormal right heel to shin and abnormal left heel to shin coordination. She has normal right finger to nose and normal left finger to nose coordination.      Results Review:     I reviewed the patient's new clinical results.       Assessment/Plan     Principal Problem:    Lower extremity weakness  Active Problems:    Chronic migraine without aura without status migrainosus, not intractable    Anxiety and depression    Asthma    Guillain Barré syndrome (CMS/HCC)      Assessment & Plan    1.  Bilateral lower extremity  weakness - EMG/NCS ordered for am.  Autoimmune labs pending.     Wilber Morales MD  07/15/18  11:44 AM       Electronically signed by Wilber Morales MD at 7/15/2018 12:36 PM     Sonia Perez MD at 7/15/2018  9:30 AM              [facility] Medicine Services  PROGRESS NOTE    Patient Name: Isidra Solano  : 1995  MRN: [id number]    Date of Admission: 2018  Length of Stay: 1  Primary Care Physician: Evelyne Myrick MD    Subjective   Subjective     CC:LE weakness    HPI: No acute events overnight, patient states that she is having some sensation on her thighs, though still weak.    Review of Systems  Gen- No fevers, chills  CV- No chest pain, palpitations  Resp- No cough, dyspnea  GI- No N/V/D, abd pain    Otherwise ROS is negative except as mentioned in the HPI.    Objective   Objective     Vital Signs:   Temp:  [98 °F (36.7 °C)-98.5 °F (36.9 °C)] 98.5 °F (36.9 °C)  Heart Rate:  [72-83] 83  Resp:  [16-20] 18  BP: (107-116)/(58-69) 107/58        Physical Exam:  Constitutional: No acute distress, awake, alert  HENT: NCAT, mucous membranes moist  Respiratory: Clear to auscultation bilaterally, respiratory effort normal   Cardiovascular: RRR, no murmurs, rubs, or gallops, palpable pedal pulses bilaterally  Gastrointestinal: Positive bowel sounds, soft, nontender, nondistended  Musculoskeletal: No bilateral ankle edema  Psychiatric: Appropriate affect, cooperative  Neurologic: Oriented x 3, bilateral LE weakness    Results Reviewed:  I have personally reviewed current lab, radiology, and data and agree.      Results from last 7 days  Lab Units 18  0049   WBC 10*3/mm3 7.99   HEMOGLOBIN g/dL 13.3   HEMATOCRIT % 40.1   PLATELETS 10*3/mm3 305       Results from last 7 days  Lab Units 18  0049   SODIUM mmol/L 139   POTASSIUM mmol/L 3.8   CHLORIDE mmol/L 108   CO2 mmol/L 25.0   BUN mg/dL 8*   CREATININE mg/dL 0.82   GLUCOSE mg/dL 108*   CALCIUM mg/dL 8.3*   ALT (SGPT)  U/L 13   AST (SGOT) U/L 13     Estimated Creatinine Clearance: 130.1 mL/min (by C-G formula based on SCr of 0.82 mg/dL).  No results found for: BNP    Microbiology Results Abnormal     Procedure Component Value - Date/Time    Culture, CSF - Cerebrospinal Fluid, Lumbar Puncture [[id number]]  (Normal) Collected:  07/13/18 8450    Lab Status:  Preliminary result Specimen:  Cerebrospinal Fluid from Lumbar Puncture Updated:  07/15/18 1554     CSF Culture No growth at 2 days     Gram Stain Result No WBCs or organisms seen        I have reviewed the medications.    Assessment/Plan   Assessment / Plan     Hospital Problem List     * (Principal)Lower extremity weakness    Chronic migraine without aura without status migrainosus, not intractable    Anxiety and depression    Asthma    Guillain Barré syndrome (CMS/HCC)         Brief Hospital Course to date:  Isidra Solano is a 23 y.o. female  otherwise healthy or history of asthma, presents to [facility] with 4-5 days of progressively worsening lower extremity weakness to the point of paralysis, prior to this episode patient does endorse having bilateral bruising on her thighs, denies any trauma.  Patient does endorse having a viral syndrome week prior     Assessment & Plan:  - Bilateral worsening lower extremity paralysis, etiology currently unknown, lower motor neuropathy likely? Guillain-Barré syndrome suspected but unlikely, neurology consulted their impression is that this is a myelopathic process, metabolic/autoimmune process vs conversion d/o. However thus far MRI L/C/T-spine are unrevealing, csf is normal, f/u autoimmune panels, EMG/NCS ordered.  - Bilateral spontaneous bruises, significance of this in addition to above symptoms is unknown, platelets are wnl  - Hx of asthma, not in exacerbations.  - PT/OT.     DVT Prophylaxis: mechanical     CODE STATUS:   Code Status and Medical Interventions:   Ordered at: 07/13/18 1382     Level Of Support Discussed With:    Patient      "Code Status:    CPR     Medical Interventions (Level of Support Prior to Arrest):    Full     Disposition: TBD, anticipate she will need rehab.    Electronically signed by Sonia Perez MD, 07/15/18, 9:30 AM.      Electronically signed by Sonia Perez MD at 7/15/2018  9:31 AM     Wilber Morales MD at 7/14/2018 11:20 AM           LOS: 1 day   Patient Care Team:  Evleyne Myrick MD as PCP - General (Family Medicine)    Chief Complaint: salvador LE weakness    Subjective     History of Present Illness    Subjective    History taken from: patient chart    Pt states she cannot move legs but will stand at bedside.  Reports numb from mid thigh down.      Objective     Vital Signs  Temp:  [97.6 °F (36.4 °C)-98.8 °F (37.1 °C)] 98.1 °F (36.7 °C)  Heart Rate:  [80-96] 96  Resp:  [18-20] 18  BP: (114-120)/(69-94) 120/94    Objective:  General Appearance:  Comfortable.    Vital signs: (most recent): Blood pressure 120/94, pulse 96, temperature 98.1 °F (36.7 °C), resp. rate 18, height 168.9 cm (66.5\"), weight 101 kg (221 lb 9 oz), SpO2 98 %, not currently breastfeeding.  Vital signs are normal.    Pupils:  Pupils are equal, round, and reactive to light.        Neurological Exam    Mental Status  The patient is and oriented to person, place, time, and situation. Her recent and remote memory are normal. Her speech is normal. Her language is fluent with no aphasia. She has normal attention span and concentration.     Cranial Nerves    CN II: The patient's visual acuity and visual fields are normal.  CN III, IV, VI: The patient's pupils are equally round and reactive to light and ocular movements are normal.  CN V: The patient has normal facial sensation  CN VII:  The patient has symmetric facial movement  CN VIII:  The patient's hearing is normal.  CN IX, X: The patient has symmetric palate movement and normal gag reflex.  CN XI: The patient's shoulder shrug strength is normal.  CN XII: The patient's tongue is midline without " atrophy or fasciculations.    Motor   Her strength is 5/5 in all four extremities except as noted.  Will move legs with distraction and can stand but asked to move legs states she bean      Sensory    States no sensation from mid thigh down      Reflexes  Deep tendon reflexes are 2+ and symmetric in all four extremities with downgoing toes bilaterally.    Gait and Coordination   She has abnormal right heel to shin and abnormal left heel to shin coordination. She has normal right finger to nose and normal left finger to nose coordination.  Can stand but will not take steps        Results Review:     I reviewed the patient's new clinical results.  I reviewed the patient's new imaging results and agree with the interpretation.  I reviewed the patient's other test results and agree with the interpretation    CSF - port 17, gluc 60, RBC 1, WBC, 1.5,   CBC, CMP - NCS    MRI Brain/C/T/L, my review of films, normal     Assessment/Plan     Principal Problem:    Lower extremity weakness  Active Problems:    Chronic migraine without aura without status migrainosus, not intractable    Anxiety and depression    Asthma    Guillain Barré syndrome (CMS/HCC)      Assessment & Plan    1.  Bilateral LE weakness - normal CSF and MRI B/C/T/L.  No evidence of CNS lesion to explain weakness.  Inconsistent exam.  Order EMG/NCS of the leg       Wilber Morales MD  18  11:20 AM       Electronically signed by Wilber Morales MD at 2018 11:57 AM     Sonia Perez MD at 2018  9:02 AM              [facility] Medicine Services  PROGRESS NOTE    Patient Name: Isidra Solano  : 1995  MRN: [id number]    Date of Admission: 2018  Length of Stay: 1  Primary Care Physician: Evelyne Myrick MD    Subjective   Subjective     CC: bilateral LE weakness    HPI:No acute events overnight, patient continues to have LE weakness, does complain of some right ankle pain.    Review of Systems  Gen- No  fevers, chills  CV- No chest pain, palpitations  Resp- No cough, dyspnea  GI- No N/V/D, abd pain  Otherwise ROS is negative except as mentioned in the HPI.    Objective   Objective     Vital Signs:   Temp:  [97.6 °F (36.4 °C)-98.8 °F (37.1 °C)] 98.1 °F (36.7 °C)  Heart Rate:  [80-96] 96  Resp:  [18-20] 18  BP: (114-120)/(69-94) 120/94      Physical Exam:  Constitutional: No acute distress, awake, alert, laying in bed  HENT: NCAT, mucous membranes moist  Respiratory: Clear to auscultation bilaterally, respiratory effort normal   Cardiovascular: RRR, no murmurs, rubs, or gallops, palpable pedal pulses bilaterally  Gastrointestinal: Positive bowel sounds, soft, nontender, nondistended  Musculoskeletal: No bilateral ankle edema  Psychiatric: Appropriate affect, cooperative  Neurologic: Oriented x 3,  Bilateral LE weakness 1/5  Skin: No rashes    Results Reviewed:  I have personally reviewed current lab, radiology, and data and agree.      Results from last 7 days  Lab Units 07/13/18  0049   WBC 10*3/mm3 7.99   HEMOGLOBIN g/dL 13.3   HEMATOCRIT % 40.1   PLATELETS 10*3/mm3 305       Results from last 7 days  Lab Units 07/13/18  0049   SODIUM mmol/L 139   POTASSIUM mmol/L 3.8   CHLORIDE mmol/L 108   CO2 mmol/L 25.0   BUN mg/dL 8*   CREATININE mg/dL 0.82   GLUCOSE mg/dL 108*   CALCIUM mg/dL 8.3*   ALT (SGPT) U/L 13   AST (SGOT) U/L 13     Estimated Creatinine Clearance: 130.1 mL/min (by C-G formula based on SCr of 0.82 mg/dL).  No results found for: BNP    Microbiology Results Abnormal     Procedure Component Value - Date/Time    Culture, CSF - Cerebrospinal Fluid, Lumbar Puncture [[id number]]  (Normal) Collected:  07/13/18 0317    Lab Status:  Preliminary result Specimen:  Cerebrospinal Fluid from Lumbar Puncture Updated:  07/14/18 0903     CSF Culture No growth     Gram Stain Result No WBCs or organisms seen          Imaging Results (last 24 hours)     Procedure Component Value Units Date/Time    MRI Thoracic Spine With &  Without Contrast [[id number]] Collected:  07/13/18 2147     Updated:  07/13/18 2152    Narrative:       EXAMINATION: MRI THORACIC SPINE W WO CONTRAST-     INDICATION: BLE weakness; G61.7-Unoiisvr-Gywjd syndrome; Z74.09-Other  reduced mobility; Z74.09-Other reduced mobility      TECHNIQUE: Multiplanar MRI of the thoracic spine with and without  intravenous contrast administration     COMPARISON: NONE     FINDINGS: Sagittal datasets reveal normal alignment of the thoracic  segments without focal subluxation. Preservation of vertebral body  heights and intervertebral disc height spaces with disc well hydrated.  Facets are well aligned.     Axial dataset evaluation without paraspinal hematoma or paraspinal soft  tissue abnormality of concern. No focal fluid collection is identified  within the paraspinal region.     Axial datasets evaluation of the cord demonstrate normal signal without  cord edema or myelomalacia. No intrathecal mass occupying lesion. Noted  spinal canal stenosis or neuroforaminal stenosis identified.     Postcontrast administration sequences without abnormal enhancement  identified specifically no abnormal cord enhancement or intrathecal  enhancement of concern. No abnormal bone marrow signal or enhancement  within the osseous structures.          Impression:       No acute pathology of the thoracic spine with normal bone  marrow signal and intrinsic signal of the thoracic cord. No abnormal  enhancement intrathecal or thoracic cord enhancement. No significant  spinal canal or neuroforaminal stenosis.         This report was finalized on 7/13/2018 9:50 PM by Dr. Eliot Esposito.       MRI Cervical Spine With & Without Contrast [[id number]] Collected:  07/13/18 2144     Updated:  07/13/18 2149    Narrative:       EXAMINATION: MRI CERVICAL SPINE W WO CONTRAST-     INDICATION: BLE weakness; G61.6-Rylrbzzi-Dinds syndrome; Z74.09-Other  reduced mobility; Z74.09-Other reduced mobility      TECHNIQUE: Multiplanar  MRI of the cervical spine with and without  intravenous contrast administration     COMPARISON: NONE     FINDINGS:      Sagittal datasets reveal flattening of the normal cervical lordotic  curvature without focal subluxation. Preservation of vertebral body  heights with normal appearance of the bone marrow signal.  Cervicomedullary junction widely patent. Axial datasets evaluation for  paraspinal soft tissue findings without paraspinal soft tissue  abnormality of concern specifically no paraspinal hematoma or fluid  collection.     Axial datasets evaluation for degenerative/spondylitic changes without  significant spinal canal or neuroforaminal stenosis identified within  the cervical levels. Normal signal within the cervical cord. No syrinx  or cord edema identified.     Postcontrast administration sequences without abnormal enhancement  specifically no abnormal enhancing intrathecal mass lesion or abnormal  enhancement within the cervical cord.          Impression:       Mild flattening of the cervical lordotic curvature maps and  muscular spasm and/or patient positioning. No acute pathology of the  cervical spine otherwise identified specifically no abnormal enhancement  or intrathecal space-occupying lesion. Normal cord signal without  stenosis and spinal canal or neuroforaminal stenosis.     This report was finalized on 7/13/2018 9:47 PM by Dr. Eliot Esposito.           I have reviewed the medications.    Assessment/Plan   Assessment / Plan     Hospital Problem List     * (Principal)Lower extremity weakness    Chronic migraine without aura without status migrainosus, not intractable    Anxiety and depression    Asthma    Guillain Barré syndrome (CMS/HCC)          Brief Hospital Course to date:  Isidra Solano is a 23 y.o. female  otherwise healthy or history of asthma, presents to [facility] with 4-5 days of progressively worsening lower extremity weakness to the point of paralysis, prior to this episode patient  does endorse having bilateral bruising on her thighs, denies any trauma.  Patient does endorse having a viral syndrome week prior    Assessment & Plan:  -  Bilateral worsening bilateral lower extremity paralysis, etiology currently unknown, lower motor neuropathy likely? Guillain-Barré syndrome suspected paralysis is not ascending, neurology consulted their impression is that this is a myelopathic process, metabolic/autoimmune process vs conversion d/o GBS is unlikely, MRI C/T-spine done and are unrevealing, f/u autoimmune panels  -Bilateral spontaneous bruises, significance of this in addition to above symptoms is unknown, platelets are wnl  - Hx of asthma, not in exacerbations.    DVT Prophylaxis: mechanical    CODE STATUS:   Code Status and Medical Interventions:   Ordered at: 07/13/18 0418     Level Of Support Discussed With:    Patient     Code Status:    CPR     Medical Interventions (Level of Support Prior to Arrest):    Full     Disposition: anticipate she will need rehab    Electronically signed by Sonia Perez MD, 07/14/18, 9:14 AM.      Electronically signed by Sonia Perez MD at 7/14/2018  9:14 AM          Consult Notes (last 72 hours) (Notes from 7/13/2018 10:00 AM through 7/16/2018 10:00 AM)      Slime Leonard MD at 7/13/2018  2:46 PM      Consult Orders:    1. Inpatient Neurology Consult [[id number]] ordered by Bright Ch PA-C at 07/13/18 0418                Neurology    Referring Provider: Kashmir Ch PA-C    Reason for Consultation: BLE weakness      Chief complaint: BLE weakness    Subjective .     History of present illness:  Mrs. Solano is a pleasant 23 year old female with history of anxiety, depression, and migraines, who is admitted to the hospitalist service for bilateral leg weakness. She reports her symptoms began this past Monday, 7/9/2018 when she noted pain in the proximal lower extremities and bruising over the anterior thighs. She denies any trauma, falls, or physical  abuse. Symptoms later progressed to include numbness of the lower extremities and progressive weakness. On the day prior to presentation she was unable to ambulate due to weakness. She presented to 2 different [facility] ([facility]) who discharged her from the ED. On arrival here, she had an LP under fluoro with unremarkable CSF analysis. She reports having some intermittent nausea prior to onset of her symptoms but denies any diarrhea or URI symptoms.    Review of Systems: positive for weakness. Otherwise complete ROS was discussed with the patient and found to be negative except for that mentioned in the HPI    History  Past Medical History:   Diagnosis Date   • Anxiety    • Asthma    • Depression    • Migraine    ,   Past Surgical History:   Procedure Laterality Date   • CHOLECYSTECTOMY  03/08/2016   ,   Family History   Problem Relation Age of Onset   • Coronary artery disease Father    • Hypertension Father    • Seizures Father    • Diabetes Father    • Diabetes Paternal Grandfather    • Breast cancer Paternal Grandmother    • Colon cancer Maternal Grandmother    • Ovarian cancer Maternal Aunt    • Multiple sclerosis Sister    • Migraines Sister    • Seizures Brother    • Coronary artery disease Maternal Uncle    • Seizures Maternal Uncle    • Diabetes Maternal Uncle    • Mental illness Maternal Uncle    • Coronary artery disease Paternal Aunt    • Diabetes Paternal Aunt    • Mental retardation Paternal Uncle    • Coronary artery disease Maternal Grandfather    • Stroke Maternal Grandfather    • Diabetes Maternal Grandfather    ,   Social History   Substance Use Topics   • Smoking status: Never Smoker   • Smokeless tobacco: Never Used   • Alcohol use No   ,   Prescriptions Prior to Admission   Medication Sig Dispense Refill Last Dose   • albuterol (PROVENTIL) (2.5 MG/3ML) 0.083% nebulizer solution Take 2.5 mg by nebulization Every 4 (Four) Hours As Needed for Wheezing.   Taking   • norgestimate-ethinyl  "estradiol (TRI-PREVIFEM) 0.18/0.215/0.25 MG-35 MCG per tablet Take 1 tablet by mouth Daily.   Taking   • amitriptyline (ELAVIL) 10 MG tablet Take 1-2 tablets by mouth Every Night. 60 tablet 5    • magnesium oxide (MAGOX) 400 (241.3 Mg) MG tablet tablet Take 1 tablet by mouth Every Night. 30 each 11    • SUMAtriptan (IMITREX) 50 MG tablet Take one tablet at onset of headache. May repeat dose one time in 2 hours if headache not relieved. 9 tablet 5     and Allergies:  Patient has no known allergies.    Objective     Vital Signs   Blood pressure 113/65, pulse 88, temperature 98.3 °F (36.8 °C), temperature source Oral, resp. rate 18, height 168.9 cm (66.5\"), weight 101 kg (221 lb 9 oz), SpO2 99 %, not currently breastfeeding.    Physical Exam:      Gen: sitting in bedside chair with eyes open. In NAD. Appears stated age   Eyes: PERRL, conjuntivae/lids normal   ENT: External canals normal bilaterally. Dentition normal   Neck: Supple. No thyroid enlargement noted   Respiratory: CTA bilaterally. Respirations unlabored   CV: RRR, S1 and S2 nml. Radial pulses 2+ bilaterally.    Skin: Bruises noted over anterior thighs, no erythema seen. Normal tugor.   MSK: Normal bulk and tone. Nml ROM     Neurologic:   Mental status: Awake, alert, oriented x4. Follows commands. Speech fluent.    CN: PERRL, EOM intact, sensation intact in upper/mid/lower face bilaterally, facial movements symmetric, hearing intact to finger rub bilaterally, palate elevates symmetrically, tongue movements and SCMs strong bilaterally    Motor: Full strength seen in bilateral upper extremities, no effort noted in either leg. Fong sign positive bilaterally.    Reflexes: 2+ and symmetric throughout in BUE and BLE. No clonus noted    Sensory: decreased LT BLE compared to BUE   Coordination: No dysmetria noted   Gait: Not tested        Results Reviewed:     Labs reviewed  MRI brain personally reviewed, no acute process noted  MRI L-spine report reviewed  EKG " report reviewed                 Assessment/Plan     1.  Bilateral leg weakness = unclear etiology but differential includes myelopathic process, metabolic/autoimmune process, or possible conversion disorder. No acute intracranial process appreciated on MRI brain. No evidence of conus/cauda equina pathology on MRI L-spine. Reflexes intact, so unlikely to be Guillain-[place] syndrome. Patient complains of lack of sensation, so unlikely to be myopathy. Recommend checking MRI C/T-spines with and without contrast. Will check autoimmune panels, including TIMMY, ANCA, SPEP, and serum porphyrins. Rehab eval         Slime Leonard MD  07/13/18  2:46 PM              Electronically signed by Slime Leonard MD at 7/14/2018  9:34 PM

## 2018-07-16 NOTE — DISCHARGE PLACEMENT REQUEST
"Isidra Curry (23 y.o. Female)      Marly Romo [phone]    Date of Birth Social Security Number Address Home Phone MRN    1995  [address]   [facility] [address] [phone] [id number]    Rastafarian Marital Status          None        Admission Date Admission Type Admitting Provider Attending Provider Department, Room/Bed    18 Emergency Sonia Perez MD Opii, [place], MD [facility] 5B, N532/1    Discharge Date Discharge Disposition Discharge Destination                       Attending Provider:  Sonia Perez MD    Allergies:  No Known Allergies    Isolation:  None   Infection:  None   Code Status:  CPR    Ht:  168.9 cm (66.5\")   Wt:  101 kg (221 lb 9 oz)    Admission Cmt:  None   Principal Problem:  Lower extremity weakness [R29.898]                 Active Insurance as of 2018     Primary Coverage     Payor Plan Insurance Group Employer/Plan Group    [employer] [place] [employer] [place]ICA[place]      Payor Plan Address Payor Plan Phone Number Effective From Effective To    [address] [address] [phone] 6/15/2017     [facility] [id number]       Subscriber Name Subscriber Birth Date Member ID       ISIDRA CURRY 1995 [id number]                 Emergency Contacts      (Rel.) Home Phone Work Phone Mobile Phone    Wilber Basilio (Spouse) [phone] -- --               History & Physical      Sonia Perez MD at 2018  4:18 AM              [facility] Medicine Services  HISTORY AND PHYSICAL    Patient Name: Isidra Curry  : 1995  MRN: [id number]  Primary Care Physician: Evelyne Myrick MD    Subjective   Subjective     Chief Complaint:  Extremity weakness    HPI:  Isidra Curry is a 23 y.o. female with a past medical history significant for asthma, anxiety/depression, and migraines who presents with complaints of bilateral lower extremity weakness since Monday. Patient states she awoke Monday morning with " "bruises on anterior thighs and associated bilateral LE numbness. States numbness then turned into pain and eventually paralysis. She is unable to move lower extremities and has completely lost sensation. Patient notes an incident today during which her father and  had to come help her out of the bathroom as she could no longer ambulate or get up from the commode. Prior to onset of lower extremity weakness, she notes mild viral like illness with nausea. States this lasted about 1-2 days. She was evaluated at two facilities PTA. Labs  and CT imaging unremarkable. Patient's family is frustrated as she has been told that \"this was all in head\". They are certain that this episode is not psychosomatic. Patient denies recent falls or trauma, but notes undergoing epidural 6 months ago during childbirth. No headache, visual changes, cough, congestion, fever, abdominal pain, or N/V/D. No changes in medications, recent vaccinations, or illicit substance abuse. Will admit for further evaluation and treatment.    Emergency Department Evaluation; vitals stable. Labs favorable. UA, HCG negative. MR brain and lumbar spine negative for acute process. LP findings unremarkable.    Review of Systems   Constitutional: Positive for fatigue. Negative for chills and fever.   HENT: Negative for congestion and trouble swallowing.    Eyes: Negative for photophobia and visual disturbance.   Respiratory: Negative for cough and shortness of breath.    Cardiovascular: Negative for chest pain and leg swelling.   Gastrointestinal: Negative for abdominal pain, diarrhea, nausea and vomiting.   Endocrine: Negative for cold intolerance and heat intolerance.   Genitourinary: Negative for flank pain.   Musculoskeletal: Positive for joint swelling.   Skin: Negative for pallor and rash.   Allergic/Immunologic: Negative for immunocompromised state.   Neurological: Positive for weakness and numbness. Negative for seizures, facial asymmetry and " headaches.   Hematological: Negative for adenopathy.   Psychiatric/Behavioral: Negative for agitation and confusion.          Otherwise 10-system ROS reviewed and is negative except as mentioned in the HPI.    Personal History     Past Medical History:   Diagnosis Date   • Anxiety    • Asthma    • Depression    • Migraine        Past Surgical History:   Procedure Laterality Date   • CHOLECYSTECTOMY  03/08/2016       Family History: family history includes Breast cancer in her paternal grandmother; Colon cancer in her maternal grandmother; Coronary artery disease in her father, maternal grandfather, maternal uncle, and paternal aunt; Diabetes in her father, maternal grandfather, maternal uncle, paternal aunt, and paternal grandfather; Hypertension in her father; Mental illness in her maternal uncle; Mental retardation in her paternal uncle; Migraines in her sister; Multiple sclerosis in her sister; Ovarian cancer in her maternal aunt; Seizures in her brother, father, and maternal uncle; Stroke in her maternal grandfather.     Social History:  reports that she has never smoked. She has never used smokeless tobacco. She reports that she does not drink alcohol or use drugs.  Social History     Social History Narrative   • No narrative on file       Medications:    (Not in a hospital admission)    No Known Allergies    Objective   Objective     Vital Signs:   Temp:  [98.8 °F (37.1 °C)] 98.8 °F (37.1 °C)  Heart Rate:  [104] 104  Resp:  [18] 18  BP: (125)/(80) 125/80        Physical Exam   Constitutional: No acute distress, awake, alert  Eyes: PERRLA, sclerae anicteric, no conjunctival injection  HENT: NCAT, mucous membranes moist  Neck: Supple, no thyromegaly, no lymphadenopathy, trachea midline  Respiratory: Clear to auscultation bilaterally, nonlabored respirations   Cardiovascular: RRR, no murmurs, rubs, or gallops, palpable pedal pulses bilaterally  Gastrointestinal: Positive bowel sounds, soft, nontender,  nondistended  Musculoskeletal: No bilateral ankle edema, no clubbing or cyanosis to extremities  Psychiatric: Appropriate affect, cooperative  Neurologic: Oriented x 3, lower extremities weak, without muscle tone. Secondary deficit. Pulses palpable Cranial Nerves grossly intact to confrontation, speech clear  Skin: No rashes      Results Reviewed:  I have personally reviewed current lab, radiology, and data and agree.      Results from last 7 days  Lab Units 07/13/18  0049   WBC 10*3/mm3 7.99   HEMOGLOBIN g/dL 13.3   HEMATOCRIT % 40.1   PLATELETS 10*3/mm3 305       Results from last 7 days  Lab Units 07/13/18  0049   SODIUM mmol/L 139   POTASSIUM mmol/L 3.8   CHLORIDE mmol/L 108   CO2 mmol/L 25.0   BUN mg/dL 8*   CREATININE mg/dL 0.82   GLUCOSE mg/dL 108*   CALCIUM mg/dL 8.3*   ALT (SGPT) U/L 13   AST (SGOT) U/L 13     Estimated Creatinine Clearance: 127.9 mL/min (by C-G formula based on SCr of 0.82 mg/dL).  Brief Urine Lab Results  (Last result in the past 365 days)      Color   Clarity   Blood   Leuk Est   Nitrite   Protein   CREAT   Urine HCG        07/13/18 0110               Negative         No results found for: BNP  Imaging Results (last 24 hours)     Procedure Component Value Units Date/Time    IR Lumbar Puncture Diagnosis [[id number]] Updated:  07/13/18 0332    MRI Brain With & Without Contrast [[id number]] Collected:  07/13/18 0014     Updated:  07/13/18 0326    Narrative:       EXAM:    MR Head Without And With Intravenous Contrast    CLINICAL HISTORY:    23 years old, female; Signs and symptoms; Other: HX of migraines; Patient HX:   Neuro deficits unable to feel and use bilateral lower extremities HX of   migraines 19 ml multihance administered; Additional info: Neuro deficit(s),   subacute. Neuro deficits unable to feel and use bilateral lower extremities HX   of migraines 19 ml multihance administered    TECHNIQUE:    Magnetic resonance images of the head/brain without and with intravenous   contrast in  multiple planes.    CONTRAST:    19 mL of Multihance administered intravenously.      COMPARISON:    No relevant prior studies available.    FINDINGS:    Brain parenchyma demonstrates normal signal intensity and enhancement without   an intra- or extra-axial mass or abnormality. No mass effect or midline shift.   No evidence of restricted diffusion in the supra-or infratentorial brain.      Midline brain structures including the corpus callosum, pituitary gland,   pineal region, and craniovertebral junction are unremarkable. Ventricles and   sulci are normal without evidence of hydrocephalus. Intracranial vessels   demonstrate a normal flow-void.    Impression:         Unremarkable study without a supra-, infratentorial mass or abnormality; no   evidence of hemorrhagic or ischemic infarct and no hydrocephalus.    THIS DOCUMENT HAS BEEN ELECTRONICALLY SIGNED BY MG NEAL MD    MRI Lumbar Spine Without Contrast [[id number]] Collected:  07/13/18 0002     Updated:  07/13/18 0323    Narrative:       EXAM:    MR Lumbar Spine Without Intravenous Contrast    CLINICAL HISTORY:    23 years old, female; Signs and symptoms; Other: Lbp; Patient HX: Lower back   pain unable to feel bilateral lower extremities; Additional info: Low back   pain, rapidly progressive neuro deficit. Lower back pain unable to feel   bilateral lower extremities    TECHNIQUE:    Magnetic resonance images of the lumbar spine without intravenous contrast in   multiple planes.    COMPARISON:    No relevant prior studies available.    FINDINGS:    Normal curvature of the spine, alignment of the vertebral bodies is normal.   Conus ends normally at the level of L1 with normal-appearing cauda equina   without evidence of compression.      Small S1 vertebral body hemangioma. Marrow shows normal signal intensity   without evidence of acute/subacute vertebral compression fracture or deformity.   No evidence of a pars defect or pars fracture.      From T11-T12 to  L5-S1 intervertebral discs are normal, no focal disc   herniation, no significant spinal stenosis or neural foramina narrowing.      Visualized sacrum, iliac bones and sacroiliac joints are unremarkable.   Pre-and paravertebral soft tissues are grossly normal. Visualized aorta is   unremarkable.    Impression:         Normal study, normal appearing cauda equina and conus without evidence of   compression or focal abnormality.    THIS DOCUMENT HAS BEEN ELECTRONICALLY SIGNED BY MG NEAL MD             Assessment/Plan   Assessment / Plan     Hospital Problem List     * (Principal)Lower extremity weakness    Chronic migraine without aura without status migrainosus, not intractable    Anxiety and depression    Asthma              Assessment & Plan:  1. Lower extremity weakness:  - Guillain Deltona syndrome suggested, but patient with sensory loss ?. Psychosomatic?. Imaging, labs, LP negative.  - consult to neurology, appreciate input  - PT/OT treat and eval  - repeat labs as needed    2. Asthma:  - stable. PRN duo nebs with additional pulmonary toilet as needed      DVT prophylaxis: mechanical    CODE STATUS:  Full code, full support  Code Status and Medical Interventions:   Ordered at: 07/13/18 0418     Level Of Support Discussed With:    Patient     Code Status:    CPR     Medical Interventions (Level of Support Prior to Arrest):    Full       Admission Status:  I believe this patient meets OBSERVATION status, however if further evaluation or treatment plans warrant, status may change.  Based upon current information, I predict patient's care encounter to be less than or equal to 2 midnights.    Electronically signed by Bright Ch PA-C, 07/13/18, 4:18 AM.      Brief Attending Admission Attestation     I have seen and examined the patient, performing an independent face-to-face diagnostic evaluation with plan of care reviewed and developed with the advanced practice clinician (APC).      Brief Summary  Statement/HPI:   Isidra Solano is a 23 y.o. female , otherwise healthy or history of asthma, presents to [facility] with 4-5 days of progressively worsening lower extremity weakness to the point of paralysis, prior to this episode patient does endorse having bilateral bruising on her thighs, denies any trauma.  Patient does endorse having a viral syndrome week prior, otherwise denies any tick bites, denies any nausea or vomiting, no diarrhea.  On procedure to [facility] patient underwent MRI that was unremarkable, status post LP that was also unrevealing she has been admitted to hospitalist medicine service for continued workup.    Attending Physical Exam:  Constitutional: No acute distress, awake, alert  Eyes: PERRLA, sclerae anicteric, no conjunctival injection  HENT: NCAT, mucous membranes moist  Neck: Supple, no thyromegaly, no lymphadenopathy, trachea midline  Respiratory: Clear to auscultation bilaterally, nonlabored respirations   Cardiovascular: RRR, no murmurs, rubs, or gallops, palpable pedal pulses bilaterally  Gastrointestinal: Positive bowel sounds, soft, nontender, nondistended  Musculoskeletal: No bilateral ankle edema, no clubbing or cyanosis to extremities  Psychiatric: Appropriate affect, cooperative  Neurologic: Oriented x 3, no lower extremity weakness, 1/5, normal muscle tone, poor sensation  Skin: No rashes      Brief Assessment/Plan :  - Bilateral worsening bilateral lower extremity paralysis, etiology currently unknown, lower motor neuropathy likely? 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    See above for further detailed assessment and plan developed with APC which I have reviewed and/or edited.    Electronically signed by Sonia Perez MD, 07/13/18, 6:40 AM.              Electronically signed by Sonia Perez MD at 7/13/2018  9:23 AM       Prior to Admission Medications     Prescriptions Last Dose Informant Patient Reported? Taking?    albuterol (PROVENTIL) (2.5 MG/3ML) 0.083% nebulizer solution  Self Yes Yes    Take 2.5 mg by nebulization Every 4 (Four) Hours As Needed for Wheezing.    norgestimate-ethinyl estradiol (TRI-PREVIFEM) 0.18/0.215/0.25 MG-35 MCG per tablet   Yes Yes    Take 1 tablet by mouth Daily.    amitriptyline (ELAVIL) 10 MG tablet   No No    Take 1-2 tablets by mouth Every Night.    magnesium oxide (MAGOX) 400 (241.3 Mg) MG tablet tablet   No No    Take 1 tablet by mouth Every Night.    SUMAtriptan (IMITREX) 50 MG tablet   No No    Take one tablet at onset of headache. May repeat dose one time in 2 hours if headache not relieved.             Physician Progress Notes (last 24 hours) (Notes from 7/15/2018  1:08 PM through 7/16/2018  1:08 PM)      Slime Leonard MD at 7/16/2018 12:25 PM          Daily Progress Note  Neurology     LOS: 1 day     Subjective     Chief Complaint: BLE weakness    Interval History:  Patient still complains of paraparesis, no acute events overnight    ROS: negative for fever    Objective     Vital signs in last 24 hours:  Temp:  [97.4 °F (36.3 °C)-98.7 °F (37.1 °C)] 97.4 °F (36.3 °C)  Heart Rate:  [71-87] 71  Resp:  [16-18] 16  BP: (111-119)/(63-71) 119/63      Physical Exam:   General: Sitting in bedside chair with eyes open. In NAD.     Respiratory: Respirations unlabored   CV: RRR       Neurologic Exam:   Mental status: Awake, alert, Follows commands. Speech fluent   CN: II-XII intact                       Results from last 7 days  Lab Units 07/13/18  0049   WBC 10*3/mm3 7.99   HEMOGLOBIN g/dL 13.3   HEMATOCRIT % 40.1   PLATELETS 10*3/mm3 305           Results Review:  Labs reviewed  EMG/NCV report reviewed  MRI C/T-spines reports reviewed      Assessment/Plan     Principal Problem:    Lower extremity weakness  Active  Problems:    Chronic migraine without aura without status migrainosus, not intractable    Anxiety and depression    Asthma    Guillain Barré syndrome (CMS/HCC)        1.  Bilateral leg weakness = unclear etiology but suspicious for conversion disorder vs less likely autoimmune process. No evidence of clear underlying neurological process. Her MRI brain and MRI C/T/L-spines were unremarkable. No evidence of acute infarct or other acute intracranial or spinal pathology to explain her symptoms. EMG/NCV report discussed mild peroneal neuropathy on left, but this would not explain her weakness. Unlikely GBS due to preservation of reflexes. TIMMY, ANCA, SPEP, and serum porphyrins pending. Consider inpatient rehab referral    No further input. Will follow as needed      Slime Leonard MD  18  12:25 PM            Electronically signed by Slime Leonard MD at 2018 12:31 PM     Sonia Perez MD at 2018  7:19 AM              [facility] Medicine Services  PROGRESS NOTE    Patient Name: Isidra Solano  : 1995  MRN: [id number]    Date of Admission: 2018  Length of Stay: 1  Primary Care Physician: Evelyne Myrick MD    Subjective   Subjective     CC:BLE weakness    HPI:Patient states that she had some nausea last night, no vomiting, patient continues to have BLE weakness    Review of Systems  Gen- No fevers, chills  CV- No chest pain, palpitations  Resp- No cough, dyspnea  GI- No N/V/D, abd pain    Otherwise ROS is negative except as mentioned in the HPI.    Objective   Objective     Vital Signs:   Temp:  [97.7 °F (36.5 °C)-98.7 °F (37.1 °C)] 97.7 °F (36.5 °C)  Heart Rate:  [75-87] 75  Resp:  [16-18] 16  BP: (107-119)/(58-71) 115/66      Physical Exam:  Constitutional: No acute distress, awake, alert  HENT: NCAT, mucous membranes moist  Respiratory: Clear to auscultation bilaterally, respiratory effort normal   Cardiovascular: RRR, no murmurs, rubs, or gallops,  palpable pedal pulses bilaterally  Gastrointestinal: Positive bowel sounds, soft, nontender, nondistended  Musculoskeletal: No bilateral ankle edema  Psychiatric: Appropriate affect, cooperative  Neurologic: Oriented x 3, ble weakness  Skin: No rashes    Results Reviewed:  I have personally reviewed current lab, radiology, and data and agree.      Results from last 7 days  Lab Units 07/13/18  0049   WBC 10*3/mm3 7.99   HEMOGLOBIN g/dL 13.3   HEMATOCRIT % 40.1   PLATELETS 10*3/mm3 305       Results from last 7 days  Lab Units 07/13/18  0049   SODIUM mmol/L 139   POTASSIUM mmol/L 3.8   CHLORIDE mmol/L 108   CO2 mmol/L 25.0   BUN mg/dL 8*   CREATININE mg/dL 0.82   GLUCOSE mg/dL 108*   CALCIUM mg/dL 8.3*   ALT (SGPT) U/L 13   AST (SGOT) U/L 13     Estimated Creatinine Clearance: 130.1 mL/min (by C-G formula based on SCr of 0.82 mg/dL).  No results found for: BNP    Microbiology Results Abnormal     Procedure Component Value - Date/Time    Culture, CSF - Cerebrospinal Fluid, Lumbar Puncture [[id number]]  (Normal) Collected:  07/13/18 0317    Lab Status:  Preliminary result Specimen:  Cerebrospinal Fluid from Lumbar Puncture Updated:  07/15/18 0754     CSF Culture No growth at 2 days     Gram Stain Result No WBCs or organisms seen        I have reviewed the medications.    Assessment/Plan   Assessment / Plan     Hospital Problem List     * (Principal)Lower extremity weakness    Chronic migraine without aura without status migrainosus, not intractable    Anxiety and depression    Asthma    Guillain Barré syndrome (CMS/HCC)          Brief Hospital Course to date:  Isidra Solano is a 23 y.o. female  otherwise healthy or history of asthma, presents to [facility] with 4-5 days of progressively worsening lower extremity weakness to the point of paralysis, prior to this episode patient does endorse having bilateral bruising on her thighs, denies any trauma.  Patient does endorse having a viral syndrome week prior     Assessment &  Plan:  - Bilateral worsening lower extremity paralysis, etiology currently unknown, lower motor neuropathy likely? Guillain-Barré syndrome suspected but unlikely, neurology consulted their impression is that this is a myelopathic process, metabolic/autoimmune process vs conversion d/o. However thus far MRI L/C/T-spine are unrevealing, csf is normal, f/u autoimmune panels, EMG/NCS planned for today.  - Bilateral spontaneous bruises, significance of this in addition to above symptoms is unknown, platelets are wnl  - Hx of asthma, not in exacerbations.  - PT/OT.     DVT Prophylaxis: mechanical    CODE STATUS:   Code Status and Medical Interventions:   Ordered at: 18 0418     Level Of Support Discussed With:    Patient     Code Status:    CPR     Medical Interventions (Level of Support Prior to Arrest):    Full       Disposition: TBD, anticipate she will need rehab,CM following    Electronically signed by Sonia Perez MD, 18, 7:22 AM.      Electronically signed by Sonia Perez MD at 2018  7:22 AM          Physical Therapy Notes (most recent note)      Zenaida Dai, PT at 2018  3:20 PM  Version 2 of 2         Acute Care - Physical Therapy Initial Evaluation  [facility]     Patient Name: Isidra Solano  : 1995  MRN: [id number]  Today's Date: 2018   Onset of Illness/Injury or Date of Surgery: 18  Date of Referral to PT: 18  Referring Physician: ANNA Ch      Admit Date: 2018    Visit Dx:     ICD-10-CM ICD-9-CM   1. 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 Impaired mobility and ADLs Z74.09 799.89   3. Impaired functional mobility, balance, gait, and endurance Z74.09 V49.89     Patient Active Problem List   Diagnosis   • Chronic migraine without aura without status migrainosus, not intractable   • Chronic daily headache   • Pregnancy   • Postpartum care following vaginal delivery   • Anxiety and depression   • Asthma   • Lower extremity weakness   •  Guillain Barré syndrome (CMS/HCC)     Past Medical History:   Diagnosis Date   • Anxiety    • Asthma    • Depression    • Migraine      Past Surgical History:   Procedure Laterality Date   • CHOLECYSTECTOMY  03/08/2016        PT ASSESSMENT (last 12 hours)      Physical Therapy Evaluation     Row Name 07/13/18 0950          PT Evaluation Time/Intention    Subjective Information complains of;fatigue;weakness;pain  -LS     Document Type evaluation  -LS     Mode of Treatment physical therapy  -LS     Patient Effort good  -LS     Symptoms Noted During/After Treatment increased pain  -LS     Row Name 07/13/18 0950          General Information    Patient Profile Reviewed? yes  -LS     Onset of Illness/Injury or Date of Surgery 07/12/18  -LS     Referring Physician ANNA Ch  -LS     Patient Observations alert;cooperative;agree to therapy  -LS     Patient/Family Observations  at bedside.   -LS     Prior Level of Function independent:;gait;transfer;ADL's;bathing;dressing  -LS     Equipment Currently Used at Home none  -LS     Pertinent History of Current Functional Problem To [facility] with 4-5 day hx of BLE bruising, numbness, and progressive pain and paralysis. MRI unremarkable; LP also unrevealing at this time.   -LS     Existing Precautions/Restrictions fall  -LS     Risks Reviewed patient:;spouse/S.O.:;LOB;dizziness;increased discomfort;change in vital signs  -LS     Benefits Reviewed patient:;spouse/S.O.:;improve function;increase independence;increase strength;increase knowledge  -LS     Row Name 07/13/18 0950          Relationship/Environment    Lives With spouse;child(lola), dependent;parent(s);sibling(s)  -LS     Row Name 07/13/18 0950          Resource/Environmental Concerns    Current Living Arrangements home/apartment/condo  -LS     Row Name 07/13/18 0950          Home Main Entrance    Number of Stairs, Main Entrance two  -LS     Row Name 07/13/18 0950          Cognitive Assessment/Interventions    Additional  Documentation Cognitive Assessment/Intervention (Group)  -     Row Name 07/13/18 0950          Cognitive Assessment/Intervention- PT/OT    Affect/Mental Status (Cognitive) WFL  -LS     Orientation Status (Cognition) oriented x 4  -LS     Follows Commands (Cognition) WFL  -LS     Safety Deficit (Cognitive) mild deficit;insight into deficits/self awareness;awareness of need for assistance;safety precautions awareness  -     Personal Safety Interventions gait belt;fall prevention program maintained;nonskid shoes/slippers when out of bed  -LS     Row Name 07/13/18 0950          Bed Mobility Assessment/Treatment    Supine-Sit Angelina (Bed Mobility) minimum assist (75% patient effort);verbal cues  -LS     Assistive Device (Bed Mobility) bed rails;head of bed elevated  -     Row Name 07/13/18 0950          Transfer Assessment/Treatment    Transfer Assessment/Treatment bed-chair transfer;sit-stand transfer;stand-sit transfer  -     Comment (Transfers) PT/ tech on either side of pt. Pt c/o bilat ankle pain during standing and req'd assist for advancing LEs. No noted knee buckling.   -LS     Bed-Chair [place] (Transfers) maximum assist (25% patient effort);2 person assist;verbal cues  -LS     Sit-Stand [place] (Transfers) moderate assist (50% patient effort);2 person assist;verbal cues  -LS     Stand-Sit [place] (Transfers) moderate assist (50% patient effort);2 person assist;verbal cues  -     Row Name 07/13/18 0950          Gait/Stairs Assessment/Training    [place] Level (Gait) unable to assess  -     Row Name 07/13/18 0950          General ROM    GENERAL ROM COMMENTS PROM grossly WFL; AROM limited by weakness. Noted slight LE tone.   -     Row Name 07/13/18 0950          MMT (Manual Muscle Testing)    Additional Documentation General Assessment (Manual Muscle Testing) (Group)  -     Row Name 07/13/18 0950          General Assessment (Manual Muscle Testing)    General Manual Muscle  Testing (MMT) Assessment lower extremity strength deficits identified  -     Row Name 07/13/18 0950          Lower Extremity (Manual Muscle Testing)    Comment, MMT: Lower Extremity 0/5 ankle/knee during supine MMT; hip flex 1/5. Functionally, pt able to perform STS mod x2 with no noted knee buckling.   -     Row Name 07/13/18 0950          Motor Assessment/Intervention    Additional Documentation Balance (Group);Therapeutic Exercise (Group)  -     Row Name 07/13/18 0950          Balance    Balance static sitting balance;static standing balance  -     Row Name 07/13/18 0950          Static Sitting Balance    Level of [place] (Unsupported Sitting, Static Balance) supervision  -     Sitting Position (Unsupported Sitting, Static Balance) sitting on edge of bed  -     Row Name 07/13/18 0950          Static Standing Balance    Level of [place] (Supported Standing, Static Balance) moderate assist, 50 to 74% patient effort  -     Row Name 07/13/18 0950          Sensory Assessment/Intervention    Sensory General Assessment light touch sensation deficits identified  -     Row Name 07/13/18 0950          Light Touch Sensation Assessment    Left Lower Extremity: Light Touch Sensation Assessment absent sensation  -     Right Lower Extremity: Light Touch Sensation Assessment absent sensation  -     Row Name 07/13/18 0950          Pain Scale: Numbers Pre/Post-Treatment    Pain Scale: Numbers, Pretreatment 0/10 - no pain  -LS     Pain Scale: Numbers, Post-Treatment 6/10  -LS     Pain Location - Side Bilateral  -LS     Pain Location ankle  -LS     Pain Intervention(s) Repositioned;Ambulation/increased activity  -     Row Name             Wound 07/13/18 0638 Left upper thigh abrasion    Wound - Properties Group Date first assessed: 07/13/18  -KR Time first assessed: 0638  -KR Present On Admission : yes  -KR Side: Left  -KR Orientation: upper  -KR Location: thigh  -KR Type: abrasion  -KR    Row Name  07/13/18 0950          Plan of Care Review    Plan of Care Reviewed With patient;spouse  -LS     Row Name 07/13/18 0950          Physical Therapy Clinical Impression    Date of Referral to PT 07/12/18  -LS     PT Diagnosis (PT Clinical Impression) impaired functional mobility, balance, gait  -LS     Patient/Family Goals Statement (PT Clinical Impression) return to PLOF; go home  -LS     Criteria for Skilled Interventions Met (PT Clinical Impression) yes;treatment indicated  -LS     Rehab Potential (PT Clinical Summary) good, to achieve stated therapy goals  -LS     Care Plan Review (PT) evaluation/treatment results reviewed  -LS     Care Plan Review, Other Participant (PT Clinical Impression) spouse  -LS     Row Name 07/13/18 0950          Vital Signs    Pre Systolic BP Rehab --   no tele present; RN gave consent for therapy on RA  -LS     Pre Patient Position Supine  -LS     Intra Patient Position Standing  -LS     Post Patient Position Sitting  -LS     Row Name 07/13/18 0950          Physical Therapy Goals    Bed Mobility Goal Selection (PT) bed mobility, PT goal 1  -LS     Transfer Goal Selection (PT) transfer, PT goal 1  -LS     Gait Training Goal Selection (PT) gait training, PT goal 1  -     Row Name 07/13/18 0950          Bed Mobility Goal 1 (PT)    Activity/Assistive Device (Bed Mobility Goal 1, PT) sit to supine/supine to sit  -LS     [place] Level/Cues Needed (Bed Mobility Goal 1, PT) independent  -LS     Time Frame (Bed Mobility Goal 1, PT) 2 weeks  -LS     Progress/Outcomes (Bed Mobility Goal 1, PT) goal ongoing  -     Row Name 07/13/18 0950          Transfer Goal 1 (PT)    Activity/Assistive Device (Transfer Goal 1, PT) sit-to-stand/stand-to-sit;walker, rolling  -LS     [place] Level/Cues Needed (Transfer Goal 1, PT) minimum assist (75% or more patient effort)  -LS     Time Frame (Transfer Goal 1, PT) 2 weeks  -LS     Progress/Outcome (Transfer Goal 1, PT) goal ongoing  -     Row Name  07/13/18 0950          Gait Training Goal 1 (PT)    Activity/Assistive Device (Gait Training Goal 1, PT) gait (walking locomotion);walker, rolling  -LS     [place] Level (Gait Training Goal 1, PT) moderate assist (50-74% patient effort)  -LS     Distance (Gait Goal 1, PT) 10  -LS     Time Frame (Gait Training Goal 1, PT) 2 weeks  -LS     Progress/Outcome (Gait Training Goal 1, PT) goal ongoing  -LS     Row Name 07/13/18 0950          Positioning and Restraints    Pre-Treatment Position in bed  -LS     Post Treatment Position chair  -LS     In Chair notified nsg;reclined;call light within reach;encouraged to call for assist;exit alarm on;with family/caregiver;RUE elevated;LUE elevated;waffle cushion;on mechanical lift sling;heels elevated  -LS     Row Name 07/13/18 0950          Living Environment    Home Accessibility stairs to enter home;tub/shower is not walk in  -LS       User Key  (r) = Recorded By, (t) = Taken By, (c) = Cosigned By    Initials Name Provider Type    LS Zenaida Dai, PT Physical Therapist    UBALDO Soares, RN Registered Nurse          Physical Therapy Education     Title: PT OT SLP Therapies (Active)     Topic: Physical Therapy (Active)     Point: Mobility training (Active)    Learning Progress Summary     Learner Status Readiness Method Response Comment Documented by    Patient Active Acceptance E,D NR   07/13/18 1511    Significant Other Active Acceptance E,D NR   07/13/18 1511          Point: Home exercise program (Active)    Learning Progress Summary     Learner Status Readiness Method Response Comment Documented by    Patient Active Acceptance E,D NR  LS 07/13/18 1511    Significant Other Active Acceptance E,D NR   07/13/18 1511          Point: Body mechanics (Active)    Learning Progress Summary     Learner Status Readiness Method Response Comment Documented by    Patient Active Acceptance E,D NR  LS 07/13/18 1511    Significant Other Active Acceptance E,D NR  LS 07/13/18 1511           Point: Precautions (Active)    Learning Progress Summary     Learner Status Readiness Method Response Comment Documented by    Patient Active Acceptance E,D NR   07/13/18 1511    Significant Other Active Acceptance E,D NR   07/13/18 1511                      User Key     Initials Effective Dates Name Provider Type Discipline     06/19/15 -  Zenaida Dai, PT Physical Therapist PT                PT Recommendation and Plan  Anticipated Discharge Disposition (PT): inpatient rehabilitation facility  Planned Therapy Interventions (PT Eval): balance training, bed mobility training, gait training, home exercise program, strengthening, transfer training, patient/family education  Therapy Frequency (PT Clinical Impression): daily  Outcome Summary/Treatment Plan (PT)  Anticipated Discharge Disposition (PT): inpatient rehabilitation facility  Plan of Care Reviewed With: patient, spouse          Outcome Measures     Row Name 07/13/18 0950 07/13/18 0908          How much help from another person do you currently need...    Turning from your back to your side while in flat bed without using bedrails? 3  -LS  --     Moving from lying on back to sitting on the side of a flat bed without bedrails? 3  -LS  --     Moving to and from a bed to a chair (including a wheelchair)? 2  -LS  --     Standing up from a chair using your arms (e.g., wheelchair, bedside chair)? 2  -LS  --     Climbing 3-5 steps with a railing? 1  -LS  --     To walk in hospital room? 1  -LS  --     AM-PAC 6 Clicks Score 12  -LS  --        How much help from another is currently needed...    Putting on and taking off regular lower body clothing?  -- 2  -STANFORD     Bathing (including washing, rinsing, and drying)  -- 2  -STANFORD     Toileting (which includes using toilet bed pan or urinal)  -- 2  -STANFORD     Putting on and taking off regular upper body clothing  -- 3  -STANFORD     Taking care of personal grooming (such as brushing teeth)  -- 4  -STANFORD     Eating meals  --  4  -STANFORD     Score  -- 17  -STANFORD        Functional Assessment    Outcome Measure Options AM-PAC 6 Clicks Basic Mobility (PT)  -LS AM-PAC 6 Clicks Daily Activity (OT)  -STANFORD       User Key  (r) = Recorded By, (t) = Taken By, (c) = Cosigned By    Initials Name Provider Type    STANFORD Paris, OT Occupational Therapist    LS Zenaida Dai, PT Physical Therapist           Time Calculation:         PT Charges     Row Name 18 0950             Time Calculation    Start Time 0950  -LS      PT Received On 18  -      PT Goal Re-Cert Due Date 18  -        User Key  (r) = Recorded By, (t) = Taken By, (c) = Cosigned By    Initials Name Provider Type    LS Zenaida Dai, PT Physical Therapist        Therapy Suggested Charges     Code   Minutes Charges    None           Therapy Charges for Today     Code Description Service Date Service Provider Modifiers Qty    54943171087 HC PT EVAL MOD COMPLEXITY 4 2018 Zenaida Dai, PT GP 1    35947644259 HC PT THER SUPP EA 15 MIN 2018 Zenaida Dai, PT GP 2    22840501795 HC PT MOBILITY CURRENT 2018 Zenaida Dai, PT GP, CL 1    59788192664 HC PT MOBILITY PROJECTED 2018 Zenaida Dai, PT GP, CK 1          PT G-Codes  Outcome Measure Options: AM-PAC 6 Clicks Basic Mobility (PT)  Functional Limitation: Mobility: Walking and moving around  Mobility: Walking and Moving Around Current Status (): At least 60 percent but less than 80 percent impaired, limited or restricted  Mobility: Walking and Moving Around Goal Status (): At least 40 percent but less than 60 percent impaired, limited or restricted      Zenaida Dai, PT  2018         Electronically signed by Zenaida Dai, PT at 2018  3:21 PM     Zenaida Dai, PT at 2018  3:20 PM  Version 1 of 2         Acute Care - Physical Therapy Initial Evaluation  [facility]     Patient Name: Isidra Solano  : 1995  MRN: [id number]  Today's Date: 2018   Onset of  Illness/Injury or Date of Surgery: 07/12/18  Date of Referral to PT: 07/12/18  Referring Physician: ANNA Ch      Admit Date: 7/12/2018    Visit Dx:     ICD-10-CM ICD-9-CM   1. 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 Impaired mobility and ADLs Z74.09 799.89   3. Impaired functional mobility, balance, gait, and endurance Z74.09 V49.89     Patient Active Problem List   Diagnosis   • Chronic migraine without aura without status migrainosus, not intractable   • Chronic daily headache   • Pregnancy   • Postpartum care following vaginal delivery   • Anxiety and depression   • Asthma   • Lower extremity weakness   • Guillain Barré syndrome (CMS/HCC)     Past Medical History:   Diagnosis Date   • Anxiety    • Asthma    • Depression    • Migraine      Past Surgical History:   Procedure Laterality Date   • CHOLECYSTECTOMY  03/08/2016        PT ASSESSMENT (last 12 hours)      Physical Therapy Evaluation     Row Name 07/13/18 0950          PT Evaluation Time/Intention    Subjective Information complains of;fatigue;weakness;pain  -LS     Document Type evaluation  -LS     Mode of Treatment physical therapy  -LS     Patient Effort good  -LS     Symptoms Noted During/After Treatment increased pain  -LS     Row Name 07/13/18 0950          General Information    Patient Profile Reviewed? yes  -LS     Onset of Illness/Injury or Date of Surgery 07/12/18  -LS     Referring Physician ANNA Ch  -LS     Patient Observations alert;cooperative;agree to therapy  -LS     Patient/Family Observations  at bedside.   -LS     Prior Level of Function independent:;gait;transfer;ADL's;bathing;dressing  -LS     Equipment Currently Used at Home none  -LS     Pertinent History of Current Functional Problem To [facility] with 4-5 day hx of BLE bruising, numbness, and progressive pain and paralysis. MRI unremarkable; LP also unrevealing at this time.   -LS     Existing Precautions/Restrictions fall  -LS     Risks Reviewed  patient:;spouse/S.O.:;LOB;dizziness;increased discomfort;change in vital signs  -     Benefits Reviewed patient:;spouse/S.O.:;improve function;increase independence;increase strength;increase knowledge  -     Row Name 07/13/18 0950          Relationship/Environment    Lives With spouse;child(lola), dependent;parent(s);sibling(s)  -     Row Name 07/13/18 0950          Resource/Environmental Concerns    Current Living Arrangements home/apartment/condo  -     Row Name 07/13/18 0950          Home Main Entrance    Number of Stairs, Main Entrance two  -     Row Name 07/13/18 0950          Cognitive Assessment/Interventions    Additional Documentation Cognitive Assessment/Intervention (Group)  -     Row Name 07/13/18 0950          Cognitive Assessment/Intervention- PT/OT    Affect/Mental Status (Cognitive) WFL  -LS     Orientation Status (Cognition) oriented x 4  -LS     Follows Commands (Cognition) WFL  -LS     Safety Deficit (Cognitive) mild deficit;insight into deficits/self awareness;awareness of need for assistance;safety precautions awareness  -     Personal Safety Interventions gait belt;fall prevention program maintained;nonskid shoes/slippers when out of bed  -     Row Name 07/13/18 0950          Bed Mobility Assessment/Treatment    Supine-Sit [place] (Bed Mobility) minimum assist (75% patient effort);verbal cues  -     Assistive Device (Bed Mobility) bed rails;head of bed elevated  -     Row Name 07/13/18 0950          Transfer Assessment/Treatment    Transfer Assessment/Treatment bed-chair transfer;sit-stand transfer;stand-sit transfer  -     Comment (Transfers) PT/ tech on either side of pt. Pt c/o bilat ankle pain during standing and req'd assist for advancing LEs. No noted knee buckling.   -LS     Bed-Chair [place] (Transfers) maximum assist (25% patient effort);2 person assist;verbal cues  -LS     Sit-Stand [place] (Transfers) moderate assist (50% patient effort);2 person  assist;verbal cues  -     Stand-Sit [place] (Transfers) moderate assist (50% patient effort);2 person assist;verbal cues  -LS     Row Name 07/13/18 0950          Gait/Stairs Assessment/Training    [place] Level (Gait) unable to assess  -LS     Row Name 07/13/18 0950          General ROM    GENERAL ROM COMMENTS PROM grossly WFL; AROM limited by weakness. Noted slight LE tone.   -LS     Row Name 07/13/18 0950          MMT (Manual Muscle Testing)    Additional Documentation General Assessment (Manual Muscle Testing) (Group)  -LS     Row Name 07/13/18 0950          General Assessment (Manual Muscle Testing)    General Manual Muscle Testing (MMT) Assessment lower extremity strength deficits identified  -LS     Row Name 07/13/18 0950          Lower Extremity (Manual Muscle Testing)    Comment, MMT: Lower Extremity 0/5 ankle/knee during supine MMT; hip flex 1/5. Functionally, pt able to perform STS mod x2 with no noted knee buckling.   -LS     Row Name 07/13/18 0950          Motor Assessment/Intervention    Additional Documentation Balance (Group);Therapeutic Exercise (Group)  -LS     Row Name 07/13/18 0950          Balance    Balance static sitting balance;static standing balance  -LS     Row Name 07/13/18 0950          Static Sitting Balance    Level of [place] (Unsupported Sitting, Static Balance) supervision  -     Sitting Position (Unsupported Sitting, Static Balance) sitting on edge of bed  -LS     Row Name 07/13/18 0950          Static Standing Balance    Level of [place] (Supported Standing, Static Balance) moderate assist, 50 to 74% patient effort  -LS     Row Name 07/13/18 0950          Sensory Assessment/Intervention    Sensory General Assessment light touch sensation deficits identified  -LS     Row Name 07/13/18 0950          Light Touch Sensation Assessment    Left Lower Extremity: Light Touch Sensation Assessment absent sensation  -     Right Lower Extremity: Light Touch Sensation  Assessment absent sensation  -LS     Row Name 07/13/18 0950          Pain Scale: Numbers Pre/Post-Treatment    Pain Scale: Numbers, Pretreatment 0/10 - no pain  -LS     Pain Scale: Numbers, Post-Treatment 6/10  -LS     Pain Location - Side Bilateral  -LS     Pain Location ankle  -LS     Pain Intervention(s) Repositioned;Ambulation/increased activity  -LS     Row Name             Wound 07/13/18 0638 Left upper thigh abrasion    Wound - Properties Group Date first assessed: 07/13/18  -KR Time first assessed: 0638  -KR Present On Admission : yes  -KR Side: Left  -KR Orientation: upper  -KR Location: thigh  -KR Type: abrasion  -KR    Row Name 07/13/18 0950          Plan of Care Review    Plan of Care Reviewed With patient;spouse  -LS     Row Name 07/13/18 0950          Physical Therapy Clinical Impression    Date of Referral to PT 07/12/18  -LS     PT Diagnosis (PT Clinical Impression) impaired functional mobility, balance, gait  -LS     Patient/Family Goals Statement (PT Clinical Impression) return to PLOF; go home  -LS     Criteria for Skilled Interventions Met (PT Clinical Impression) yes;treatment indicated  -LS     Rehab Potential (PT Clinical Summary) good, to achieve stated therapy goals  -LS     Care Plan Review (PT) evaluation/treatment results reviewed  -LS     Care Plan Review, Other Participant (PT Clinical Impression) spouse  -LS     Row Name 07/13/18 0961          Vital Signs    Pre Systolic BP Rehab --   no tele present; RN gave consent for therapy on RA  -LS     Pre Patient Position Supine  -LS     Intra Patient Position Standing  -LS     Post Patient Position Sitting  -LS     Row Name 07/13/18 0912          Physical Therapy Goals    Bed Mobility Goal Selection (PT) bed mobility, PT goal 1  -LS     Transfer Goal Selection (PT) transfer, PT goal 1  -LS     Gait Training Goal Selection (PT) gait training, PT goal 1  -LS     Row Name 07/13/18 0950          Bed Mobility Goal 1 (PT)    Activity/Assistive  Device (Bed Mobility Goal 1, PT) sit to supine/supine to sit  -LS     [place] Level/Cues Needed (Bed Mobility Goal 1, PT) independent  -LS     Time Frame (Bed Mobility Goal 1, PT) 2 weeks  -LS     Progress/Outcomes (Bed Mobility Goal 1, PT) goal ongoing  -LS     Row Name 07/13/18 0950          Transfer Goal 1 (PT)    Activity/Assistive Device (Transfer Goal 1, PT) sit-to-stand/stand-to-sit;walker, rolling  -LS     [place] Level/Cues Needed (Transfer Goal 1, PT) minimum assist (75% or more patient effort)  -LS     Time Frame (Transfer Goal 1, PT) 2 weeks  -LS     Progress/Outcome (Transfer Goal 1, PT) goal ongoing  -LS     Row Name 07/13/18 0950          Gait Training Goal 1 (PT)    Activity/Assistive Device (Gait Training Goal 1, PT) gait (walking locomotion);walker, rolling  -LS     [place] Level (Gait Training Goal 1, PT) moderate assist (50-74% patient effort)  -LS     Distance (Gait Goal 1, PT) 10  -LS     Time Frame (Gait Training Goal 1, PT) 2 weeks  -LS     Progress/Outcome (Gait Training Goal 1, PT) goal ongoing  -     Row Name 07/13/18 0950          Positioning and Restraints    Pre-Treatment Position in bed  -LS     Post Treatment Position chair  -LS     In Chair notified nsg;reclined;call light within reach;encouraged to call for assist;exit alarm on;with family/caregiver;RUE elevated;LUE elevated;waffle cushion;on mechanical lift sling;heels elevated  -LS     Row Name 07/13/18 0950          Living Environment    Home Accessibility stairs to enter home;tub/shower is not walk in  -LS       User Key  (r) = Recorded By, (t) = Taken By, (c) = Cosigned By    Initials Name Provider Type    LS Zenaida Dai, PT Physical Therapist    UBALDO Soares, RN Registered Nurse          Physical Therapy Education     Title: PT OT SLP Therapies (Active)     Topic: Physical Therapy (Active)     Point: Mobility training (Active)    Learning Progress Summary     Learner Status Readiness Method Response  Comment Documented by    Patient Active Acceptance E,D NR  LS 07/13/18 1511    Significant Other Active Acceptance E,D NR  LS 07/13/18 1511          Point: Home exercise program (Active)    Learning Progress Summary     Learner Status Readiness Method Response Comment Documented by    Patient Active Acceptance E,D NR  LS 07/13/18 1511    Significant Other Active Acceptance E,D NR  LS 07/13/18 1511          Point: Body mechanics (Active)    Learning Progress Summary     Learner Status Readiness Method Response Comment Documented by    Patient Active Acceptance E,D NR  LS 07/13/18 1511    Significant Other Active Acceptance E,D NR  LS 07/13/18 1511          Point: Precautions (Active)    Learning Progress Summary     Learner Status Readiness Method Response Comment Documented by    Patient Active Acceptance E,D NR  LS 07/13/18 1511    Significant Other Active Acceptance E,D NR  LS 07/13/18 1511                      User Key     Initials Effective Dates Name Provider Type Discipline     06/19/15 -  Zenaida Dai, PT Physical Therapist PT                PT Recommendation and Plan  Anticipated Discharge Disposition (PT): inpatient rehabilitation facility  Planned Therapy Interventions (PT Eval): balance training, bed mobility training, gait training, home exercise program, strengthening, transfer training, patient/family education  Therapy Frequency (PT Clinical Impression): daily  Outcome Summary/Treatment Plan (PT)  Anticipated Discharge Disposition (PT): inpatient rehabilitation facility  Plan of Care Reviewed With: patient, spouse          Outcome Measures     Row Name 07/13/18 0950 07/13/18 0908          How much help from another person do you currently need...    Turning from your back to your side while in flat bed without using bedrails? 3  -LS  --     Moving from lying on back to sitting on the side of a flat bed without bedrails? 3  -LS  --     Moving to and from a bed to a chair (including a wheelchair)? 2   -LS  --     Standing up from a chair using your arms (e.g., wheelchair, bedside chair)? 2  -LS  --     Climbing 3-5 steps with a railing? 1  -LS  --     To walk in hospital room? 1  -LS  --     AM-PAC 6 Clicks Score 12  -LS  --        How much help from another is currently needed...    Putting on and taking off regular lower body clothing?  -- 2  -STANFORD     Bathing (including washing, rinsing, and drying)  -- 2  -STANFORD     Toileting (which includes using toilet bed pan or urinal)  -- 2  -STANFORD     Putting on and taking off regular upper body clothing  -- 3  -STANFORD     Taking care of personal grooming (such as brushing teeth)  -- 4  -STANFORD     Eating meals  -- 4  -STANFORD     Score  -- 17  -STANFORD        Functional Assessment    Outcome Measure Options AM-PAC 6 Clicks Basic Mobility (PT)  -LS AM-PAC 6 Clicks Daily Activity (OT)  -STANFORD       User Key  (r) = Recorded By, (t) = Taken By, (c) = Cosigned By    Initials Name Provider Type    STANFORD Beatriz Paris, OT Occupational Therapist    LS Zenaida Dai, PT Physical Therapist           Time Calculation:         PT Charges     Row Name 07/13/18 0950             Time Calculation    Start Time 0950  -      PT Received On 07/13/18  -      PT Goal Re-Cert Due Date 07/23/18  -        User Key  (r) = Recorded By, (t) = Taken By, (c) = Cosigned By    Initials Name Provider Type    LS Zenaida Dai, PT Physical Therapist        Therapy Suggested Charges     Code   Minutes Charges    None           Therapy Charges for Today     Code Description Service Date Service Provider Modifiers Qty    [id number]  PT EVAL MOD COMPLEXITY 4 7/13/2018 Zenaida Dai, PT GP 1    [id number]  PT THER SUPP EA 15 MIN 7/13/2018 Zenaida Dai, PT GP 2          PT G-Codes  Outcome Measure Options: AM-PAC 6 Clicks Basic Mobility (PT)      Zenaida Dai, PT  7/13/2018         Electronically signed by Zenaida Dai, PT at 7/13/2018  3:20 PM          Occupational Therapy Notes (most recent note)      Abigail Godoy  OT at 7/15/2018  3:41 PM          Acute Care - Occupational Therapy Treatment Note   [facility]     Patient Name: Isidra Solano  : 1995  MRN: [id number]  Today's Date: 7/15/2018  Onset of Illness/Injury or Date of Surgery: 18  Date of Referral to OT: 18  Referring Physician: ANNA Ch    Admit Date: 2018       ICD-10-CM ICD-9-CM   1. 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 Impaired mobility and ADLs Z74.09 799.89   3. Impaired functional mobility, balance, gait, and endurance Z74.09 V49.89     Patient Active Problem List   Diagnosis   • Chronic migraine without aura without status migrainosus, not intractable   • Chronic daily headache   • Pregnancy   • Postpartum care following vaginal delivery   • Anxiety and depression   • Asthma   • Lower extremity weakness   • Guillain Barré syndrome (CMS/HCC)     Past Medical History:   Diagnosis Date   • Anxiety    • Asthma    • Depression    • Migraine      Past Surgical History:   Procedure Laterality Date   • CHOLECYSTECTOMY  2016       Therapy Treatment          Rehabilitation Treatment Summary     Row Name 07/15/18 1516             Treatment Time/Intention    Discipline occupational therapist  -JR      Document Type therapy note (daily note)  -JR      Subjective Information no complaints  -JR      Mode of Treatment occupational therapy  -JR      Care Plan Review risks/benefits reviewed;patient/other agree to care plan  -JR      Patient Effort good  -JR      Existing Precautions/Restrictions fall  -JR      Recorded by [JR] Abigail Godoy, OT 07/15/18 1539      Row Name 07/15/18 1516             Cognitive Assessment/Intervention- PT/OT    Affect/Mental Status (Cognitive) WNL  -JR      Orientation Status (Cognition) oriented x 3  -JR      Follows Commands (Cognition) WNL  -JR      Recorded by [JR] Abigail Godoy, OT 07/15/18 1539      Row Name 07/15/18 1516             Bed Mobility Assessment/Treatment    Comment (Bed  Mobility) Defer-up in w/c  -JR      Recorded by [JR] Abigail Godoy, OT 07/15/18 1539      Row Name 07/15/18 1516             Motor Skills Assessment/Interventions    Additional Documentation Therapeutic Exercise (Group);Therapeutic Exercise Interventions (Group)  -JR      Recorded by [JR] Abigail Godoy, OT 07/15/18 1539      Row Name 07/15/18 1516             Therapeutic Exercise    49624 - OT Therapeutic Activity Minutes 11  -JR      Recorded by [JR] Abigail Godoy, OT 07/15/18 1539      Row Name 07/15/18 1516             Therapeutic Exercise    Upper Extremity Range of Motion (Therapeutic Exercise) shoulder flexion/extension, bilateral;shoulder horizontal abduction/adduction, bilateral;elbow flexion/extension, bilateral;shoulder internal/external rotation, bilateral  -JR      Weight/Resistance (Therapeutic Exercise) yellow   therabansd  -JR      Exercise Type (Therapeutic Exercise) resistive exercises  -JR      Position (Therapeutic Exercise) seated  -JR      Sets/Reps (Therapeutic Exercise) 10 reps, except 5 reps horizontal ab/adduction  -JR      Recorded by [JR] Abigail Godoy, OT 07/15/18 1539      Row Name 07/15/18 1516             Positioning and Restraints    Pre-Treatment Position sitting in chair/recliner  -JR      Post Treatment Position wheelchair  -JR      In Wheelchair notified nsg;sitting;call light within reach;encouraged to call for assist;on mechanical lift sling  -JR      Recorded by [JR] Abigail Godoy, OT 07/15/18 1539      Row Name 07/15/18 1516             Pain Scale: Numbers Pre/Post-Treatment    Pain Scale: Numbers, Pretreatment 0/10 - no pain  -JR      Pain Scale: Numbers, Post-Treatment 0/10 - no pain  -JR      Recorded by [JR] Abigail Godoy, OT 07/15/18 1539      Row Name                Wound 07/13/18 0638 Left upper thigh abrasion    Wound - Properties Group Date first assessed: 07/13/18 [KR] Time first assessed: 0638 [KR] Present On Admission : yes [KR] Side: Left [KR]  Orientation: upper [KR] Location: thigh [KR] Type: abrasion [KR] Recorded by:  [KR] Joanne Soares RN 07/13/18 0638    Row Name 07/15/18 1516             Plan of Care Review    Plan of Care Reviewed With patient  -JR      Recorded by [JR] Abigail Godoy, OT 07/15/18 1539      Row Name 07/15/18 1516             Outcome Summary/Treatment Plan (OT)    Daily Summary of Progress (OT) progress toward functional goals is good  -JR      Barriers to Overall Progress (OT) medically complex  -JR      Plan for Continued Treatment (OT) Continue OT per POC  -JR      Anticipated Discharge Disposition (OT) inpatient rehabilitation facility  -JR      Recorded by [JR] Abigail Godoy, OT 07/15/18 1539        User Key  (r) = Recorded By, (t) = Taken By, (c) = Cosigned By    Initials Name Effective Dates Discipline    JR Abigail Godoy, OT 06/22/15 -  OT    KR Joanne Soares RN 06/16/16 -  Nurse        Wound 07/13/18 0638 Left upper thigh abrasion (Active)   Dressing Appearance dry;open to air 7/15/2018  3:21 PM   Periwound intact 7/15/2018  3:21 PM   Dressing Care, Wound open to air 7/14/2018  8:00 PM         Occupational Therapy Education     Title: PT OT SLP Therapies (Active)     Topic: Occupational Therapy (Active)     Point: ADL training (Done)     Description: Instruct learner(s) on proper safety adaptation and remediation techniques during self care or transfers.   Instruct in proper use of assistive devices.   Learning Progress Summary     Learner Status Readiness Method Response Comment Documented by    Patient Done Acceptance VETO ESPOSITONR role OT, reason for consult, noted deficits, leg  use, bed mobility,goal setting. STANFORD 07/13/18 0954          Point: Home exercise program (Active)     Description: Instruct learner(s) on appropriate technique for monitoring, assisting and/or progressing therapeutic exercises/activities.   Learning Progress Summary     Learner Status Readiness Method Response Comment Documented by     Patient Active Acceptance E NR Educated pt regarding theraband HEP  07/15/18 1539     Done Acceptance E,VETO CARR,NR role OT, reason for consult, noted deficits, leg  use, bed mobility,goal setting.  07/13/18 0954          Point: Precautions (Done)     Description: Instruct learner(s) on prescribed precautions during self-care and functional transfers.   Learning Progress Summary     Learner Status Readiness Method Response Comment Documented by    Patient Done Acceptance E,D LILLY,NR role OT, reason for consult, noted deficits, leg  use, bed mobility,goal setting.  07/13/18 0954                      User Key     Initials Effective Dates Name Provider Type Discipline     06/08/18 -  Beatriz Paris, OT Occupational Therapist OT     06/22/15 -  Abigail Godoy, OT Occupational Therapist OT                OT Recommendation and Plan  Outcome Summary/Treatment Plan (OT)  Daily Summary of Progress (OT): progress toward functional goals is good  Barriers to Overall Progress (OT): medically complex  Plan for Continued Treatment (OT): Continue OT per POC  Anticipated Discharge Disposition (OT): inpatient rehabilitation facility  Daily Summary of Progress (OT): progress toward functional goals is good  Plan of Care Review  Plan of Care Reviewed With: patient  Plan of Care Reviewed With: patient  Outcome Summary: Initiated theraband HEP. Recommend continued skilled OT services to increase independence with ADL's. Pt would benefit from inpatient rehab following acute care.        Outcome Measures     Row Name 07/15/18 1516 07/13/18 0950 07/13/18 0908       How much help from another person do you currently need...    Turning from your back to your side while in flat bed without using bedrails?  -- 3  -LS  --    Moving from lying on back to sitting on the side of a flat bed without bedrails?  -- 3  -LS  --    Moving to and from a bed to a chair (including a wheelchair)?  -- 2  -LS  --    Standing up from a chair  using your arms (e.g., wheelchair, bedside chair)?  -- 2  -LS  --    Climbing 3-5 steps with a railing?  -- 1  -LS  --    To walk in hospital room?  -- 1  -LS  --    AM-PAC 6 Clicks Score  -- 12  -LS  --       How much help from another is currently needed...    Putting on and taking off regular lower body clothing? 2  -JR  -- 2  -STANFORD    Bathing (including washing, rinsing, and drying) 2  -JR  -- 2  -STANFORD    Toileting (which includes using toilet bed pan or urinal) 2  -JR  -- 2  -STANFORD    Putting on and taking off regular upper body clothing 3  -JR  -- 3  -STANFORD    Taking care of personal grooming (such as brushing teeth) 4  -JR  -- 4  -STANFORD    Eating meals 4  -JR  -- 4  -STANFORD    Score 17  -JR  -- 17  -STANFORD       Functional Assessment    Outcome Measure Options AM-PAC 6 Clicks Daily Activity (OT)  -JR AM-PAC 6 Clicks Basic Mobility (PT)  -LS AM-PAC 6 Clicks Daily Activity (OT)  -STANFORD      User Key  (r) = Recorded By, (t) = Taken By, (c) = Cosigned By    Initials Name Provider Type    STANFORD Paris, OT Occupational Therapist    JR Abigail Godoy, OT Occupational Therapist    RIKI Dai, PT Physical Therapist           Time Calculation:         Time Calculation- OT     Row Name 07/15/18 1516             Time Calculation- OT    OT Start Time 1516  -      Total Timed Code Minutes- OT 11 minute(s)  -      OT Received On 07/15/18  -         Timed Charges    19099 - OT Therapeutic Activity Minutes 11  -JR        User Key  (r) = Recorded By, (t) = Taken By, (c) = Cosigned By    Initials Name Provider Type    JR Abigail Godoy, OT Occupational Therapist           Therapy Suggested Charges     Code   Minutes Charges    39497 (CPT®) Hc Ot Neuromusc Re Education Ea 15 Min      73455 (CPT®) Hc Ot Ther Proc Ea 15 Min      76923 (CPT®) Hc Ot Therapeutic Act Ea 15 Min 11 1    81540 (CPT®) Hc Ot Manual Therapy Ea 15 Min      01411 (CPT®) Hc Ot Iontophoresis Ea 15 Min      74847 (CPT®) Hc Ot Elec Stim Ea-Per 15 Min      88828  (CPT®) Hc Ot Ultrasound Ea 15 Min      85200 (CPT®) Hc Ot Self Care/Mgmt/Train Ea 15 Min      Total  11 1        Therapy Charges for Today     Code Description Service Date Service Provider Modifiers Qty    [id number] HC OT THERAPEUTIC ACT EA 15 MIN 7/15/2018 Abigail Godoy OT GO 1               Abigail Godoy OT  7/15/2018    Electronically signed by Abigail Godoy OT at 7/15/2018  3:41 PM

## 2018-07-16 NOTE — PLAN OF CARE
Problem: Patient Care Overview  Goal: Plan of Care Review  Outcome: Ongoing (interventions implemented as appropriate)   07/14/18 0419 07/15/18 2000 07/16/18 0344   Coping/Psychosocial   Plan of Care Reviewed With --  patient --    Patient Care Overview   IRF Plan of Care Review progress ongoing, continue --  --    Progress, Functional Goals demonstrating adequate progress --  --    OTHER   Outcome Summary --  --  VSS, no c/o pain. UOP-WN. Pt slept well throughout the night. Nerve conversion test scheduled for today.      Goal: Individualization and Mutuality  Outcome: Ongoing (interventions implemented as appropriate)    Goal: Discharge Needs Assessment  Outcome: Ongoing (interventions implemented as appropriate)      Problem: Fall Risk (Adult)  Goal: Identify Related Risk Factors and Signs and Symptoms  Outcome: Ongoing (interventions implemented as appropriate)    Goal: Absence of Fall  Outcome: Ongoing (interventions implemented as appropriate)      Problem: Skin Injury Risk (Adult)  Goal: Identify Related Risk Factors and Signs and Symptoms  Outcome: Ongoing (interventions implemented as appropriate)    Goal: Skin Health and Integrity  Outcome: Ongoing (interventions implemented as appropriate)

## 2018-07-16 NOTE — PROGRESS NOTES
[facility] Medicine Services  PROGRESS NOTE    Patient Name: Isidra Solano  : 1995  MRN: [id number]    Date of Admission: 2018  Length of Stay: 1  Primary Care Physician: Evelyne Myrick MD    Subjective   Subjective     CC:BLE weakness    HPI:Patient states that she had some nausea last night, no vomiting, patient continues to have BLE weakness    Review of Systems  Gen- No fevers, chills  CV- No chest pain, palpitations  Resp- No cough, dyspnea  GI- No N/V/D, abd pain    Otherwise ROS is negative except as mentioned in the HPI.    Objective   Objective     Vital Signs:   Temp:  [97.7 °F (36.5 °C)-98.7 °F (37.1 °C)] 97.7 °F (36.5 °C)  Heart Rate:  [75-87] 75  Resp:  [16-18] 16  BP: (107-119)/(58-71) 115/66      Physical Exam:  Constitutional: No acute distress, awake, alert  HENT: NCAT, mucous membranes moist  Respiratory: Clear to auscultation bilaterally, respiratory effort normal   Cardiovascular: RRR, no murmurs, rubs, or gallops, palpable pedal pulses bilaterally  Gastrointestinal: Positive bowel sounds, soft, nontender, nondistended  Musculoskeletal: No bilateral ankle edema  Psychiatric: Appropriate affect, cooperative  Neurologic: Oriented x 3, ble weakness  Skin: No rashes    Results Reviewed:  I have personally reviewed current lab, radiology, and data and agree.      Results from last 7 days  Lab Units 18  0049   WBC 10*3/mm3 7.99   HEMOGLOBIN g/dL 13.3   HEMATOCRIT % 40.1   PLATELETS 10*3/mm3 305       Results from last 7 days  Lab Units 18  0049   SODIUM mmol/L 139   POTASSIUM mmol/L 3.8   CHLORIDE mmol/L 108   CO2 mmol/L 25.0   BUN mg/dL 8*   CREATININE mg/dL 0.82   GLUCOSE mg/dL 108*   CALCIUM mg/dL 8.3*   ALT (SGPT) U/L 13   AST (SGOT) U/L 13     Estimated Creatinine Clearance: 130.1 mL/min (by C-G formula based on SCr of 0.82 mg/dL).  No results found for: BNP    Microbiology Results Abnormal     Procedure Component Value - Date/Time     Culture, CSF - Cerebrospinal Fluid, Lumbar Puncture [[id number]]  (Normal) Collected:  07/13/18 0315    Lab Status:  Preliminary result Specimen:  Cerebrospinal Fluid from Lumbar Puncture Updated:  07/15/18 7856     CSF Culture No growth at 2 days     Gram Stain Result No WBCs or organisms seen        I have reviewed the medications.    Assessment/Plan   Assessment / Plan     Hospital Problem List     * (Principal)Lower extremity weakness    Chronic migraine without aura without status migrainosus, not intractable    Anxiety and depression    Asthma    Guillain Barré syndrome (CMS/HCC)          Brief Hospital Course to date:  Isidra Solano is a 23 y.o. female  otherwise healthy or history of asthma, presents to [facility] with 4-5 days of progressively worsening lower extremity weakness to the point of paralysis, prior to this episode patient does endorse having bilateral bruising on her thighs, denies any trauma.  Patient does endorse having a viral syndrome week prior     Assessment & Plan:  - Bilateral worsening lower extremity paralysis, etiology currently unknown, lower motor neuropathy likely? Guillain-Barré syndrome suspected but unlikely, neurology consulted their impression is that this is a myelopathic process, metabolic/autoimmune process vs conversion d/o. However thus far MRI L/C/T-spine are unrevealing, csf is normal, f/u autoimmune panels, EMG/NCS planned for today.  - Bilateral spontaneous bruises, significance of this in addition to above symptoms is unknown, platelets are wnl  - Hx of asthma, not in exacerbations.  - PT/OT.     DVT Prophylaxis: mechanical    CODE STATUS:   Code Status and Medical Interventions:   Ordered at: 07/13/18 0418     Level Of Support Discussed With:    Patient     Code Status:    CPR     Medical Interventions (Level of Support Prior to Arrest):    Full       Disposition: TBD, anticipate she will need rehab,CM following    Electronically signed by Sonia Perez MD, 07/16/18,  7:22 AM.

## 2018-07-16 NOTE — PROGRESS NOTES
Daily Progress Note  Neurology     LOS: 1 day     Subjective     Chief Complaint: BLE weakness    Interval History:  Patient still complains of paraparesis, no acute events overnight    ROS: negative for fever    Objective     Vital signs in last 24 hours:  Temp:  [97.4 °F (36.3 °C)-98.7 °F (37.1 °C)] 97.4 °F (36.3 °C)  Heart Rate:  [71-87] 71  Resp:  [16-18] 16  BP: (111-119)/(63-71) 119/63      Physical Exam:   General: Sitting in bedside chair with eyes open. In NAD.     Respiratory: Respirations unlabored   CV: RRR       Neurologic Exam:   Mental status: Awake, alert, Follows commands. Speech fluent   CN: II-XII intact                       Results from last 7 days  Lab Units 07/13/18  0049   WBC 10*3/mm3 7.99   HEMOGLOBIN g/dL 13.3   HEMATOCRIT % 40.1   PLATELETS 10*3/mm3 305           Results Review:  Labs reviewed  EMG/NCV report reviewed  MRI C/T-spines reports reviewed      Assessment/Plan     Principal Problem:    Lower extremity weakness  Active Problems:    Chronic migraine without aura without status migrainosus, not intractable    Anxiety and depression    Asthma    Guillain Barré syndrome (CMS/HCC)        1.  Bilateral leg weakness = unclear etiology but suspicious for conversion disorder vs less likely autoimmune process. No evidence of clear underlying neurological process. Her MRI brain and MRI C/T/L-spines were unremarkable. No evidence of acute infarct or other acute intracranial or spinal pathology to explain her symptoms. EMG/NCV report discussed mild peroneal neuropathy on left, but this would not explain her weakness. Unlikely GBS due to preservation of reflexes. TIMMY, ANCA, SPEP, and serum porphyrins pending. Consider inpatient rehab referral    No further input. Will follow as needed      Slime Leonard MD  07/16/18  12:25 PM

## 2018-07-17 VITALS
DIASTOLIC BLOOD PRESSURE: 63 MMHG | SYSTOLIC BLOOD PRESSURE: 109 MMHG | TEMPERATURE: 97.7 F | BODY MASS INDEX: 35.61 KG/M2 | OXYGEN SATURATION: 97 % | HEIGHT: 66 IN | WEIGHT: 221.56 LBS | RESPIRATION RATE: 16 BRPM | HEART RATE: 75 BPM

## 2018-07-17 PROBLEM — G61.0 GUILLAIN BARRÉ SYNDROME (HCC): Status: RESOLVED | Noted: 2018-07-13 | Resolved: 2018-07-17

## 2018-07-17 LAB — ANA SER QL IA: NEGATIVE

## 2018-07-17 PROCEDURE — 97110 THERAPEUTIC EXERCISES: CPT

## 2018-07-17 PROCEDURE — 97535 SELF CARE MNGMENT TRAINING: CPT

## 2018-07-17 PROCEDURE — 97116 GAIT TRAINING THERAPY: CPT

## 2018-07-17 PROCEDURE — 99239 HOSP IP/OBS DSCHRG MGMT >30: CPT | Performed by: INTERNAL MEDICINE

## 2018-07-17 RX ADMIN — Medication 10 ML: at 14:14

## 2018-07-17 NOTE — PROGRESS NOTES
Continued Stay Note  [facility]     Patient Name: Isidra Solano  MRN: [id number]  Today's Date: 7/17/2018    Admit Date: 7/12/2018          Discharge Plan     Row Name 07/17/18 1432       Plan    Plan Home with outpatient PT    Patient/Family in Agreement with Plan yes    Plan Comments ADAN followed up with rehab referral to [facility] and spoke with admssions staff who report they would not be able to accept pt due to insurance. Pt's only option for rehab with her insurance would be a faciltiy like [facility]. [facility] would be the closest facility for pt for rehab. SW udpated pt at bedside. Pt reports she really doesn't want to go far away from home with her family. ADAN explained Home health and outpatient PT options as well. Pt reports her one year anniversary with her  is this weekend and she really wants to be home for that. Pt reports she would prefer to do outpatient therapy. Pt already has the wheelchair at bedside that was ordered last week through [employer] and reports her  and father would be able to transport her to physical therapy at [facility] as it is very close to her home. ADAN spoke with physician and updated him on what pt was requesting. Pt to discharge home with outpatient PT at [facility]. ADAN called [facility] outpatinet [phone] and staff reports fax number is [phone]. ADAN faxed outpatient PT referral to [facility].     Final Discharge Disposition Code 01 - home or self-care              Discharge Codes    No documentation.       Expected Discharge Date and Time     Expected Discharge Date Expected Discharge Time    Jul 17, 2018             TRISTAN Johnson

## 2018-07-17 NOTE — PROGRESS NOTES
Case Management Discharge Note    Final Note: ADAN faxed outpatient PT orders to [facility] outpatient rehab phone [phone] and fax [phone]. Staff at [facility] outpatient PT report they will follow up with pt. Pt also already has a wheelchair to go home with that was ordered last week through [employer]. DAAN provided pt at bedside with the outpatient PT order in case she will need it and also the phone number to the outpatient PT at [facility]. Pt reports she is confidant she will have enough support and help at home and will be able to get to outpatient PT. Pt had no further discharge needs or concerns.     Destination     No service has been selected for the patient.      Durable Medical Equipment     No service has been selected for the patient.      Dialysis/Infusion     No service has been selected for the patient.      Home Medical Care     No service has been selected for the patient.      Social Care     No service has been selected for the patient.             Final Discharge Disposition Code: 01 - home or self-care

## 2018-07-17 NOTE — DISCHARGE SUMMARY
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 female otherwise healthy or history of asthma, presented to [facility] with 4-5 days of progressively worsening lower extremity weakness to the point of paralysis, she denied any trauma, did endorse some thing bruising and viral syndrome. The etiology of her bilateral lower extremity weakness/paralysis, was unknown, the ddx included autoimmune vs Guillain-Barré syndrome vs conversion disorder. Neurology was consulted their impression was that this could be myelopathic process, metabolic/autoimmune process, or possible conversion disorder, GBS was unlikely as she had intact reflexes. Work-up included MRI L/C/T-spine that were unrevealing, LP was done, CSF finding was normal,  an EMG/NCS was done that showed mild peroneal neuropathy on left, as such its very likely that this is a conversion d/o. TIMMY was negative, ANCA panel and porphyrin were pending. Rehab was recommended. CM and SW was involved, patient wanted a rehab close to home however this was not possible  sec to her insurance. She insisted on being close to home, as such SW arranged wheelchair and outpatient PT at [facility].    Day of Discharge     HPI: No acute events overnight, patient states that she is doing ok, has had some slight improvement.    Review of Systems  Gen- No fevers, chills  CV- No chest pain, palpitations  Resp- No cough, dyspnea  GI- No N/V/D, abd pain    Otherwise ROS is negative except as mentioned in the HPI.    Vital Signs:   Temp:  [97.7 °F (36.5 °C)-98.4 °F (36.9 °C)] 97.7 °F (36.5 °C)  Heart Rate:  [75-93] 75  Resp:  [16-18] 16  BP: (105-120)/(59-70) 109/63     Physical Exam:  Constitutional: No acute distress, awake, alert  HENT: NCAT, mucous membranes moist  Respiratory: Clear to auscultation bilaterally, respiratory effort normal   Cardiovascular: RRR, no murmurs, rubs, or gallops, palpable pedal pulses bilaterally  Gastrointestinal: Positive bowel sounds, soft, nontender, nondistended  Musculoskeletal: No bilateral ankle edema  Psychiatric: Appropriate affect, cooperative  Neurologic: Oriented x 3, BLE  weakness  Skin: No rashes    Pertinent  and/or Most Recent Results       Results from last 7 days  Lab Units 07/13/18  0049   WBC 10*3/mm3 7.99   HEMOGLOBIN g/dL 13.3   HEMATOCRIT % 40.1   PLATELETS 10*3/mm3 305   SODIUM mmol/L 139   POTASSIUM mmol/L 3.8   CHLORIDE mmol/L 108   CO2 mmol/L 25.0   BUN mg/dL 8*   CREATININE mg/dL 0.82   GLUCOSE mg/dL 108*   CALCIUM mg/dL 8.3*       Results from last 7 days  Lab Units 07/13/18  0049   BILIRUBIN mg/dL 0.6   ALK PHOS U/L 86   ALT (SGPT) U/L 13   AST (SGOT) U/L 13           Invalid input(s): TG, LDLCALC, LDLREALC      Brief Urine Lab Results  (Last result in the past 365 days)      Color   Clarity   Blood   Leuk Est   Nitrite   Protein   CREAT   Urine HCG        07/13/18 0110               Negative           Microbiology Results Abnormal     Procedure Component Value - Date/Time    Culture, CSF - Cerebrospinal Fluid,  Lumbar Puncture [[id number]]  (Normal) Collected:  07/13/18 0317    Lab Status:  Preliminary result Specimen:  Cerebrospinal Fluid from Lumbar Puncture Updated:  07/17/18 1105     CSF Culture No growth at 4 days     Gram Stain Result No WBCs or organisms seen          Imaging Results (all)     Procedure Component Value Units Date/Time    MRI Thoracic Spine With & Without Contrast [[id number]] Collected:  07/13/18 2147     Updated:  07/13/18 2152    Narrative:       EXAMINATION: MRI THORACIC SPINE W WO CONTRAST-     INDICATION: BLE weakness; G61.0-Dzqwbuyl-Tvbxq syndrome; Z74.09-Other  reduced mobility; Z74.09-Other reduced mobility      TECHNIQUE: Multiplanar MRI of the thoracic spine with and without  intravenous contrast administration     COMPARISON: NONE     FINDINGS: Sagittal datasets reveal normal alignment of the thoracic  segments without focal subluxation. Preservation of vertebral body  heights and intervertebral disc height spaces with disc well hydrated.  Facets are well aligned.     Axial dataset evaluation without paraspinal hematoma or paraspinal soft  tissue abnormality of concern. No focal fluid collection is identified  within the paraspinal region.     Axial datasets evaluation of the cord demonstrate normal signal without  cord edema or myelomalacia. No intrathecal mass occupying lesion. Noted  spinal canal stenosis or neuroforaminal stenosis identified.     Postcontrast administration sequences without abnormal enhancement  identified specifically no abnormal cord enhancement or intrathecal  enhancement of concern. No abnormal bone marrow signal or enhancement  within the osseous structures.          Impression:       No acute pathology of the thoracic spine with normal bone  marrow signal and intrinsic signal of the thoracic cord. No abnormal  enhancement intrathecal or thoracic cord enhancement. No significant  spinal canal or neuroforaminal stenosis.         This report was finalized on  7/13/2018 9:50 PM by Dr. Eliot Esposito.       MRI Cervical Spine With & Without Contrast [[id number]] Collected:  07/13/18 2144     Updated:  07/13/18 2149    Narrative:       EXAMINATION: MRI CERVICAL SPINE W WO CONTRAST-     INDICATION: BLE weakness; G61.9-Rujedcky-Iisqf syndrome; Z74.09-Other  reduced mobility; Z74.09-Other reduced mobility      TECHNIQUE: Multiplanar MRI of the cervical spine with and without  intravenous contrast administration     COMPARISON: NONE     FINDINGS:      Sagittal datasets reveal flattening of the normal cervical lordotic  curvature without focal subluxation. Preservation of vertebral body  heights with normal appearance of the bone marrow signal.  Cervicomedullary junction widely patent. Axial datasets evaluation for  paraspinal soft tissue findings without paraspinal soft tissue  abnormality of concern specifically no paraspinal hematoma or fluid  collection.     Axial datasets evaluation for degenerative/spondylitic changes without  significant spinal canal or neuroforaminal stenosis identified within  the cervical levels. Normal signal within the cervical cord. No syrinx  or cord edema identified.     Postcontrast administration sequences without abnormal enhancement  specifically no abnormal enhancing intrathecal mass lesion or abnormal  enhancement within the cervical cord.          Impression:       Mild flattening of the cervical lordotic curvature maps and  muscular spasm and/or patient positioning. No acute pathology of the  cervical spine otherwise identified specifically no abnormal enhancement  or intrathecal space-occupying lesion. Normal cord signal without  stenosis and spinal canal or neuroforaminal stenosis.     This report was finalized on 7/13/2018 9:47 PM by Dr. Eliot Esposito.       IR Lumbar Puncture Diagnosis [[id number]] Collected:  07/13/18 0802     Updated:  07/13/18 0846    Narrative:       EXAMINATION: IR LUMBAR PUNCTURE DIAGNOSIS-     INDICATION:  Neuropathy.     COMPARISON: None.       Impression:       FINDINGS/IMPRESSION: 17 seconds of fluoroscopy used for localization of  the L4-L5 disc space by Dr. Suarez for lumbar puncture. Please see the  procedural report for full details.     D:  07/13/2018  E:  07/13/2018     This report was finalized on 7/13/2018 8:44 AM by Dr. Haley Dorado MD.       MRI Brain With & Without Contrast [[id number]] Collected:  07/13/18 0014     Updated:  07/13/18 0326    Narrative:       EXAM:    MR Head Without And With Intravenous Contrast    CLINICAL HISTORY:    23 years old, female; Signs and symptoms; Other: HX of migraines; Patient HX:   Neuro deficits unable to feel and use bilateral lower extremities HX of   migraines 19 ml multihance administered; Additional info: Neuro deficit(s),   subacute. Neuro deficits unable to feel and use bilateral lower extremities HX   of migraines 19 ml multihance administered    TECHNIQUE:    Magnetic resonance images of the head/brain without and with intravenous   contrast in multiple planes.    CONTRAST:    19 mL of Multihance administered intravenously.      COMPARISON:    No relevant prior studies available.    FINDINGS:    Brain parenchyma demonstrates normal signal intensity and enhancement without   an intra- or extra-axial mass or abnormality. No mass effect or midline shift.   No evidence of restricted diffusion in the supra-or infratentorial brain.      Midline brain structures including the corpus callosum, pituitary gland,   pineal region, and craniovertebral junction are unremarkable. Ventricles and   sulci are normal without evidence of hydrocephalus. Intracranial vessels   demonstrate a normal flow-void.    Impression:         Unremarkable study without a supra-, infratentorial mass or abnormality; no   evidence of hemorrhagic or ischemic infarct and no hydrocephalus.    THIS DOCUMENT HAS BEEN ELECTRONICALLY SIGNED BY MG NEAL MD    MRI Lumbar Spine Without Contrast  [[id number]] Collected:  07/13/18 0002     Updated:  07/13/18 0323    Narrative:       EXAM:    MR Lumbar Spine Without Intravenous Contrast    CLINICAL HISTORY:    23 years old, female; Signs and symptoms; Other: Lbp; Patient HX: Lower back   pain unable to feel bilateral lower extremities; Additional info: Low back   pain, rapidly progressive neuro deficit. Lower back pain unable to feel   bilateral lower extremities    TECHNIQUE:    Magnetic resonance images of the lumbar spine without intravenous contrast in   multiple planes.    COMPARISON:    No relevant prior studies available.    FINDINGS:    Normal curvature of the spine, alignment of the vertebral bodies is normal.   Conus ends normally at the level of L1 with normal-appearing cauda equina   without evidence of compression.      Small S1 vertebral body hemangioma. Marrow shows normal signal intensity   without evidence of acute/subacute vertebral compression fracture or deformity.   No evidence of a pars defect or pars fracture.      From T11-T12 to L5-S1 intervertebral discs are normal, no focal disc   herniation, no significant spinal stenosis or neural foramina narrowing.      Visualized sacrum, iliac bones and sacroiliac joints are unremarkable.   Pre-and paravertebral soft tissues are grossly normal. Visualized aorta is   unremarkable.    Impression:         Normal study, normal appearing cauda equina and conus without evidence of   compression or focal abnormality.    THIS DOCUMENT HAS BEEN ELECTRONICALLY SIGNED BY MG NEAL MD           Order Current Status    ANCA Panel In process    Porphyrin, Total In process    Culture, CSF - Cerebrospinal Fluid, Lumbar Puncture Preliminary result        Discharge Details        Discharge Medications      Continue These Medications      Instructions Start Date   albuterol (2.5 MG/3ML) 0.083% nebulizer solution  Commonly known as:  PROVENTIL   2.5 mg, Nebulization, Every 4 Hours PRN      amitriptyline 10 MG  tablet  Commonly known as:  ELAVIL   10-20 mg, Oral, Nightly      magnesium oxide 400 (241.3 Mg) MG tablet tablet  Commonly known as:  MAGOX   400 mg, Oral, Nightly      SUMAtriptan 50 MG tablet  Commonly known as:  IMITREX   Take one tablet at onset of headache. May repeat dose one time in 2 hours if headache not relieved.      TRI-PREVIFEM 0.18/0.215/0.25 MG-35 MCG per tablet  Generic drug:  norgestimate-ethinyl estradiol   1 tablet, Oral, Daily             Discharge Disposition:  Home or Self Care    Discharge Diet:  Diet Instructions     Advance Diet As Tolerated             Discharge Activity:   Activity Instructions     Activity as Tolerated           Special Instructions: None    Code Status/Level of Support:  Code Status and Medical Interventions:   Ordered at: 07/13/18 0418     Level Of Support Discussed With:    Patient     Code Status:    CPR     Medical Interventions (Level of Support Prior to Arrest):    Full     No future appointments.    Additional Instructions for the Follow-ups that You Need to Schedule     Ambulatory Referral to Physical Therapy Evaluate and treat    As directed      Specialty needed:  Evaluate and treat         Discharge Follow-up with PCP    As directed      Currently Documented PCP:  Evelyne Myrick MD  PCP Phone Number:  [phone]    Follow Up Details:  PCP in 1 week post-hospitalization               Time Spent on Discharge: 40 minutes    Electronically signed by Sonia Perez MD, 07/17/18, 2:12 PM.

## 2018-07-17 NOTE — PLAN OF CARE
Problem: Patient Care Overview  Goal: Plan of Care Review  Outcome: Ongoing (interventions implemented as appropriate)   07/16/18 1815 07/16/18 2000 07/17/18 0415   Coping/Psychosocial   Plan of Care Reviewed With --  patient;family --    Patient Care Overview   IRF Plan of Care Review progress ongoing, continue --  --    Progress, Functional Goals demonstrating adequate progress --  --    OTHER   Outcome Summary --  --  VSS. Denies pain. Claims she now has feeling above her knees in thigh areas bilaterally. Slept well through shift. No complaints. No events. Continue to monitor.      Goal: Individualization and Mutuality  Outcome: Ongoing (interventions implemented as appropriate)    Goal: Discharge Needs Assessment  Outcome: Ongoing (interventions implemented as appropriate)      Problem: Fall Risk (Adult)  Goal: Identify Related Risk Factors and Signs and Symptoms  Outcome: Ongoing (interventions implemented as appropriate)    Goal: Absence of Fall  Outcome: Ongoing (interventions implemented as appropriate)      Problem: Skin Injury Risk (Adult)  Goal: Identify Related Risk Factors and Signs and Symptoms  Outcome: Ongoing (interventions implemented as appropriate)    Goal: Skin Health and Integrity  Outcome: Ongoing (interventions implemented as appropriate)

## 2018-07-17 NOTE — PROGRESS NOTES
[facility] Medicine Services  PROGRESS NOTE    Patient Name: Isidra Solano  : 1995  MRN: [id number]    Date of Admission: 2018  Length of Stay: 2  Primary Care Physician: Evelyne Myrick MD    Subjective   Subjective     CC:BLE weakness    HPI:No acute events overnight, patient still has BLE weakness but with some improvement    Review of Systems  Gen- No fevers, chills  CV- No chest pain, palpitations  Resp- No cough, dyspnea  GI- No N/V/D, abd pain    Otherwise ROS is negative except as mentioned in the HPI.    Objective   Objective     Vital Signs:   Temp:  [97.7 °F (36.5 °C)-98.4 °F (36.9 °C)] 97.7 °F (36.5 °C)  Heart Rate:  [75-93] 75  Resp:  [16-18] 16  BP: (105-120)/(59-70) 109/63      Physical Exam:  Constitutional: No acute distress, awake, alert  HENT: NCAT, mucous membranes moist  Respiratory: Clear to auscultation bilaterally, respiratory effort normal   Cardiovascular: RRR, no murmurs, rubs, or gallops, palpable pedal pulses bilaterally  Gastrointestinal: Positive bowel sounds, soft, nontender, nondistended  Musculoskeletal: No bilateral ankle edema  Psychiatric: Appropriate affect, cooperative  Neurologic: Oriented x 3, BLE weakness  Skin: No rashes    Results Reviewed:  I have personally reviewed current lab, radiology, and data and agree.      Results from last 7 days  Lab Units 18  0049   WBC 10*3/mm3 7.99   HEMOGLOBIN g/dL 13.3   HEMATOCRIT % 40.1   PLATELETS 10*3/mm3 305       Results from last 7 days  Lab Units 18  0049   SODIUM mmol/L 139   POTASSIUM mmol/L 3.8   CHLORIDE mmol/L 108   CO2 mmol/L 25.0   BUN mg/dL 8*   CREATININE mg/dL 0.82   GLUCOSE mg/dL 108*   CALCIUM mg/dL 8.3*   ALT (SGPT) U/L 13   AST (SGOT) U/L 13     Estimated Creatinine Clearance: 130.1 mL/min (by C-G formula based on SCr of 0.82 mg/dL).  No results found for: BNP    Microbiology Results Abnormal     Procedure Component Value - Date/Time    Culture, CSF -  Cerebrospinal Fluid, Lumbar Puncture [[id number]]  (Normal) Collected:  07/13/18 0317    Lab Status:  Preliminary result Specimen:  Cerebrospinal Fluid from Lumbar Puncture Updated:  07/16/18 1029     CSF Culture No growth at 3 days     Gram Stain Result No WBCs or organisms seen        I have reviewed the medications.    Assessment/Plan   Assessment / Plan     Hospital Problem List     * (Principal)Lower extremity weakness    Chronic migraine without aura without status migrainosus, not intractable    Anxiety and depression    Asthma    Guillain Barré syndrome (CMS/HCC)         Brief Hospital Course to date:  Isidra Solano is a 23 y.o. female  otherwise healthy or history of asthma, presents to [facility] with 4-5 days of progressively worsening lower extremity weakness to the point of paralysis, prior to this episode patient does endorse having bilateral bruising on her thighs, denies any trauma.  Patient does endorse having a viral syndrome week prior     Assessment & Plan:  - Bilateral lower extremity weakness/paralysis, etiology currently unknown, lower motor neuropathy likely? Autoimmune vs Guillain-Barré syndrome vs conversion disorder were suspected  neurology consulted. Thus far MRI L/C/T-spine are unrevealing, csf is normal, EMG/NCS showed mild peroneal neuropathy on left, as such its very likely that this is a conversion d/o, rec rehab  - Bilateral spontaneous bruises, significance of this in addition to above symptoms is unknown, platelets are wnl  - Hx of asthma, not in exacerbations.  - PT/OT.     DVT Prophylaxis: mechanical    CODE STATUS:   Code Status and Medical Interventions:   Ordered at: 07/13/18 0418     Level Of Support Discussed With:    Patient     Code Status:    CPR     Medical Interventions (Level of Support Prior to Arrest):    Full     Disposition: anticipate d/c to rehab, she is medically ready,CM following    Electronically signed by Sonia Perez MD, 07/17/18, 10:55 AM.

## 2018-07-17 NOTE — THERAPY TREATMENT NOTE
Acute Care - Occupational Therapy Treatment Note  [facility]     Patient Name: Isidra Solano  : 1995  MRN: [id number]  Today's Date: 2018  Onset of Illness/Injury or Date of Surgery: 18  Date of Referral to OT: 18  Referring Physician: ANNA Ch    Admit Date: 2018       ICD-10-CM ICD-9-CM   1. 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 Impaired mobility and ADLs Z74.09 799.89   3. Impaired functional mobility, balance, gait, and endurance Z74.09 V49.89     Patient Active Problem List   Diagnosis   • Chronic migraine without aura without status migrainosus, not intractable   • Chronic daily headache   • Pregnancy   • Postpartum care following vaginal delivery   • Anxiety and depression   • Asthma   • Lower extremity weakness     Past Medical History:   Diagnosis Date   • Anxiety    • Asthma    • Depression    • Migraine      Past Surgical History:   Procedure Laterality Date   • CHOLECYSTECTOMY  2016       Therapy Treatment          Rehabilitation Treatment Summary     Row Name 18 1340             Treatment Time/Intention    Discipline occupational therapist  -CL      Document Type therapy note (daily note)  -CL      Subjective Information no complaints  -CL      Patient Effort good  -CL      Existing Precautions/Restrictions fall  -CL      Recorded by [CL] Carol Ladd OT 18 1413      Row Name 18 1340             Vital Signs    Pre Systolic BP Rehab --   No jessie RN cleared for tx.   -CL      Recorded by [CL] Carol Ladd OT 18 1413      Row Name 18 1340             Cognitive Assessment/Intervention- PT/OT    Affect/Mental Status (Cognitive) WFL  -CL      Orientation Status (Cognition) oriented x 4  -CL      Follows Commands (Cognition) WFL  -CL      Cognitive Function (Cognitive) safety deficit  -CL      Safety Deficit (Cognitive) mild deficit;awareness of need for assistance;safety precautions awareness  -CL      Personal Safety  Interventions fall prevention program maintained;nonskid shoes/slippers when out of bed;supervised activity  -CL      Recorded by [CL] Carol Ladd OT 07/17/18 1413      Row Name 07/17/18 1340             Bed Mobility Assessment/Treatment    Bed Mobility Assessment/Treatment supine-sit;sit-supine;scooting/bridging  -CL      Scooting/Bridging [place] (Bed Mobility) conditional independence   to HOB w/ bed flat  -CL      Supine-Sit [place] (Bed Mobility) conditional independence  -CL      Sit-Supine [place] (Bed Mobility) conditional independence  -CL      Assistive Device (Bed Mobility) bed rails;leg   -CL      Recorded by [CL] Carol Ladd OT 07/17/18 1413      Row Name 07/17/18 1340             Functional Mobility    Functional Mobility- Comment Defer to PT.   -CL      Recorded by [CL] Carol Ladd OT 07/17/18 1413      Row Name 07/17/18 1340             Transfer Assessment/Treatment    Comment (Transfers) Defer to PT.   -CL      Recorded by [CL] Carol Ladd OT 07/17/18 1413      Row Name 07/17/18 1340             ADL Assessment/Intervention    34901 - OT Self Care/Mgmt Minutes 10  -CL      BADL Assessment/Intervention lower body dressing  -CL      Recorded by [CL] Carol Ladd OT 07/17/18 1413      Row Name 07/17/18 1340             Lower Body Dressing Assessment/Training    Lower Body Dressing [place] Level don;doff;socks;independent  -CL      Assistive Devices (Lower Body Dressing) long-handled shoe horn  -CL      Lower Body Dressing Position edge of bed sitting  -CL      Comment (Lower Body Dressing) Pt performed side sitting technique, educated on  use of LH shoe horn, would benefit from attempting don/doff shoes.   -CL      Recorded by [CL] Carol Ladd OT 07/17/18 1413      Row Name 07/17/18 1340             Therapeutic Exercise    37865 - OT Therapeutic Activity Minutes 5  -CL      Recorded by [CL] Carol Ladd OT 07/17/18 1413      Row Name 07/17/18 1340             Static  Sitting Balance    Level of [place] (Unsupported Sitting, Static Balance) independent  -CL      Sitting Position (Unsupported Sitting, Static Balance) sitting on edge of bed  -CL      Recorded by [CL] Carol Ladd OT 07/17/18 1413      Row Name 07/17/18 1340             Positioning and Restraints    Pre-Treatment Position in bed  -CL      Post Treatment Position bed  -CL      In Bed fowlers;call light within reach;encouraged to call for assist;notified nsg   RN deferred exit alarm  -CL      Recorded by [CL] Carol Ladd OT 07/17/18 1413      Row Name 07/17/18 1340             Pain Scale: Numbers Pre/Post-Treatment    Pain Scale: Numbers, Pretreatment 0/10 - no pain  -CL      Pain Scale: Numbers, Post-Treatment 0/10 - no pain  -CL      Recorded by [CL] Carol Ladd OT 07/17/18 1413      Row Name                Wound 07/13/18 0638 Left upper thigh abrasion    Wound - Properties Group Date first assessed: 07/13/18 [KR] Time first assessed: 0638 [KR] Present On Admission : yes [KR] Side: Left [KR] Orientation: upper [KR] Location: thigh [KR] Type: abrasion [KR] Recorded by:  [KR] Joanne Soares RN 07/13/18 0638      User Key  (r) = Recorded By, (t) = Taken By, (c) = Cosigned By    Initials Name Effective Dates Discipline    KR Joanne Soares RN 06/16/16 -  Nurse    CL Carol Ladd OT 04/03/18 -  OT        Wound 07/13/18 0638 Left upper thigh abrasion (Active)   Dressing Appearance dry;open to air 7/16/2018  8:00 PM   Periwound intact 7/16/2018  8:00 PM   Dressing Care, Wound open to air 7/16/2018  8:00 PM             OT Rehab Goals     Row Name 07/17/18 1340             Bed Mobility Goal 1 (OT)    Progress/Outcomes (Bed Mobility Goal 1, OT) goal partially met  -CL         Dressing Goal 1 (OT)    Progress/Outcome (Dressing Goal 1, OT) goal partially met   met for socks  -CL        User Key  (r) = Recorded By, (t) = Taken By, (c) = Cosigned By    Initials Name Provider Type Discipline    CL Carol Ladd OT Occupational  Therapist OT        Occupational Therapy Education     Title: PT OT SLP Therapies (Active)     Topic: Occupational Therapy (Active)     Point: ADL training (Active)     Description: Instruct learner(s) on proper safety adaptation and remediation techniques during self care or transfers.   Instruct in proper use of assistive devices.   Learning Progress Summary     Learner Status Readiness Method Response Comment Documented by    Patient Active Acceptance E NR Pt educated on adaptive dressing techniques, role of OT, and benefits of therapy. CL 07/17/18 1414     Done Acceptance E,D VU,NR role OT, reason for consult, noted deficits, leg  use, bed mobility,goal setting. ST[place]ORD 07/13/18 0954          Point: Home exercise program (Active)     Description: Instruct learner(s) on appropriate technique for monitoring, assisting and/or progressing therapeutic exercises/activities.   Learning Progress Summary     Learner Status Readiness Method Response Comment Documented by    Patient Active Acceptance E NR Pt educated on adaptive dressing techniques, role of OT, and benefits of therapy. CL 07/17/18 1414     Active Acceptance E NR Educated pt regarding theraband HEP JR 07/15/18 1539     Done Acceptance E,D VU,NR role OT, reason for consult, noted deficits, leg  use, bed mobility,goal setting. [place] 07/13/18 0954          Point: Precautions (Active)     Description: Instruct learner(s) on prescribed precautions during self-care and functional transfers.   Learning Progress Summary     Learner Status Readiness Method Response Comment Documented by    Patient Active Acceptance E NR Pt educated on adaptive dressing techniques, role of OT, and benefits of therapy. CL 07/17/18 1414     Done Acceptance E,D VU,NR role OT, reason for consult, noted deficits, leg  use, bed mobility,goal setting. [place] 07/13/18 0954          Point: Body mechanics (Active)     Description: Instruct learner(s) on proper positioning and spine  alignment during self-care, functional mobility activities and/or exercises.   Learning Progress Summary     Learner Status Readiness Method Response Comment Documented by    Patient Active Acceptance E NR Pt educated on adaptive dressing techniques, role of OT, and benefits of therapy. CL 07/17/18 1414                      User Key     Initials Effective Dates Name Provider Type Discipline    [place] 06/08/18 -  Beatriz Paris, OT Occupational Therapist OT    JR 06/22/15 -  Abigail Godoy OT Occupational Therapist OT    CL 04/03/18 -  Carol Ladd OT Occupational Therapist OT                OT Recommendation and Plan     Plan of Care Review  Plan of Care Reviewed With: patient  Plan of Care Reviewed With: patient        Outcome Measures     Row Name 07/17/18 1340 07/16/18 1450 07/16/18 1126       How much help from another person do you currently need...    Turning from your back to your side while in flat bed without using bedrails?  --  -- 3  -LS (r) CM (t) LS (c)    Moving from lying on back to sitting on the side of a flat bed without bedrails?  --  -- 3  -LS (r) CM (t) LS (c)    Moving to and from a bed to a chair (including a wheelchair)?  --  -- 2  -LS (r) CM (t) LS (c)    Standing up from a chair using your arms (e.g., wheelchair, bedside chair)?  --  -- 3  -LS (r) CM (t) LS (c)    Climbing 3-5 steps with a railing?  --  -- 1  -LS (r) CM (t) LS (c)    To walk in hospital room?  --  -- 1  -LS (r) CM (t) LS (c)    AM-PAC 6 Clicks Score  --  -- 13  -LS (r) CM (t)       How much help from another is currently needed...    Putting on and taking off regular lower body clothing? 3  -CL 2  -CL  --    Bathing (including washing, rinsing, and drying) 2  -CL 2  -CL  --    Toileting (which includes using toilet bed pan or urinal) 2  -CL 2  -CL  --    Putting on and taking off regular upper body clothing 3  -CL 3  -CL  --    Taking care of personal grooming (such as brushing teeth) 4  -CL 4  -CL  --    Eating meals 4  -CL  4  -CL  --    Score 18  -CL 17  -CL  --       Functional Assessment    Outcome Measure Options AM-PAC 6 Clicks Daily Activity (OT)  -CL AM-PAC 6 Clicks Daily Activity (OT)  -CL AM-PAC 6 Clicks Basic Mobility (PT)  -LS (r) CM (t) LS (c)    Row Name 07/15/18 1516             How much help from another is currently needed...    Putting on and taking off regular lower body clothing? 2  -JR      Bathing (including washing, rinsing, and drying) 2  -JR      Toileting (which includes using toilet bed pan or urinal) 2  -JR      Putting on and taking off regular upper body clothing 3  -JR      Taking care of personal grooming (such as brushing teeth) 4  -JR      Eating meals 4  -JR      Score 17  -JR         Functional Assessment    Outcome Measure Options AM-PAC 6 Clicks Daily Activity (OT)  -JR        User Key  (r) = Recorded By, (t) = Taken By, (c) = Cosigned By    Initials Name Provider Type    JR Abigail Godoy, OT Occupational Therapist    LS Zenaida Dai, PT Physical Therapist    CL Carol Ladd, OT Occupational Therapist    CM Leni Aragon, PT Student PT Student           Time Calculation:         Time Calculation- OT     Row Name 07/17/18 1419 07/17/18 1340          Time Calculation- OT    OT Start Time 1340  -CL  --     OT Received On 07/17/18  -CL  --     OT Goal Re-Cert Due Date 07/23/18  -CL  --        Timed Charges    97248 - OT Therapeutic Activity Minutes  -- 5  -CL     61161 - OT Self Care/Mgmt Minutes  -- 10  -CL       User Key  (r) = Recorded By, (t) = Taken By, (c) = Cosigned By    Initials Name Provider Type    CRYSTAL Ladd, OT Occupational Therapist           Therapy Suggested Charges     Code   Minutes Charges    67833 (CPT®) Hc Ot Neuromusc Re Education Ea 15 Min      57318 (CPT®) Hc Ot Ther Proc Ea 15 Min      86336 (CPT®) Hc Ot Therapeutic Act Ea 15 Min 5     32976 (CPT®) Hc Ot Manual Therapy Ea 15 Min      88961 (CPT®) Hc Ot Iontophoresis Ea 15 Min      88117 (CPT®) Hc Ot Elec Stim Ea-Per 15  Min      58239 (CPT®) Hc Ot Ultrasound Ea 15 Min      60828 (CPT®) Hc Ot Self Care/Mgmt/Train Ea 15 Min 10 1    Total  15 1        Therapy Charges for Today     Code Description Service Date Service Provider Modifiers Qty    80770164201 HC OT THERAPEUTIC ACT EA 15 MIN 7/16/2018 Carol Ladd OT GO 1    32968052637 HC OT SELF CARE/MGMT/TRAIN EA 15 MIN 7/17/2018 Carol Ladd OT GO 1               Carol Ladd OT  7/17/2018

## 2018-07-17 NOTE — PLAN OF CARE
Problem: Patient Care Overview  Goal: Plan of Care Review  Outcome: Ongoing (interventions implemented as appropriate)   07/17/18 1440   Coping/Psychosocial   Plan of Care Reviewed With patient   OTHER   Outcome Summary Pt progressed to ambulating 15 ft using FWW and min A x2. Pt limited from decreased proprioception, decreased sensory integration, and fatigue. Provided pt w/ education for HEP, transfer training, and w/c propulsion. Pt would benefit from cont'd skilled PT services for safe d/c home.    Plan of Care Review   Progress improving

## 2018-07-17 NOTE — THERAPY TREATMENT NOTE
Acute Care - Physical Therapy Treatment Note  [facility]     Patient Name: Isidra Solano  : 1995  MRN: [id number]  Today's Date: 2018  Onset of Illness/Injury or Date of Surgery: 18  Date of Referral to PT: 18  Referring Physician: ANNA Ch    Admit Date: 2018    Visit Dx:    ICD-10-CM ICD-9-CM   1. 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 Impaired mobility and ADLs Z74.09 799.89   3. Impaired functional mobility, balance, gait, and endurance Z74.09 V49.89     Patient Active Problem List   Diagnosis   • Chronic migraine without aura without status migrainosus, not intractable   • Chronic daily headache   • Pregnancy   • Postpartum care following vaginal delivery   • Anxiety and depression   • Asthma   • Lower extremity weakness       Therapy Treatment          Rehabilitation Treatment Summary     Row Name 18 1440 18 1340          Treatment Time/Intention    Discipline (P)  physical therapist  -CM occupational therapist  -CL     Document Type (P)  therapy note (daily note)  -CM therapy note (daily note)  -CL     Subjective Information (P)  no complaints  -CM no complaints  -CL     Mode of Treatment (P)  physical therapy  -CM  --     Care Plan Review (P)  evaluation/treatment results reviewed;care plan/treatment goals reviewed;risks/benefits reviewed;patient/other agree to care plan  -CM  --     Patient Effort (P)  good  -CM good  -CL     Existing Precautions/Restrictions (P)  fall  -CM fall  -CL     Recorded by [CM] Leni Aragon, PT Student 18 1544 [CL] Carol Ladd OT 18 1413     Row Name 18 1440 18 1340          Vital Signs    Pre Systolic BP Rehab (P)  --   No value (RN gave consent to treat)  -CM --   No tele RN cleared for tx.   -CL     O2 Delivery Pre Treatment (P)  room air  -CM  --     O2 Delivery Intra Treatment (P)  room air  -CM  --     O2 Delivery Post Treatment (P)  room air  -CM  --     Pre Patient Position (P)   Supine  -CM  --     Intra Patient Position (P)  Standing  -CM  --     Post Patient Position (P)  Sitting  -CM  --     Rest Breaks  (P)  1   sitting   -CM  --     Recorded by [CM] Leni Aragon, PT Student 07/17/18 1544 [CL] Carol Ldad, OT 07/17/18 1413     Row Name 07/17/18 1440             Cognitive Assessment/Intervention    Additional Documentation (P)  Cognitive Assessment/Intervention (Group)  -CM      Recorded by [CM] Leni Aragon, PT Student 07/17/18 1544      Row Name 07/17/18 1440 07/17/18 1340          Cognitive Assessment/Intervention- PT/OT    Affect/Mental Status (Cognitive) (P)  WFL  -CM WFL  -CL     Orientation Status (Cognition) (P)  oriented x 4  -CM oriented x 4  -CL     Follows Commands (Cognition) (P)  WFL  -CM WFL  -CL     Cognitive Function (Cognitive) (P)  safety deficit  -CM2 safety deficit  -CL     Safety Deficit (Cognitive) (P)  mild deficit;safety precautions awareness  -CM2 mild deficit;awareness of need for assistance;safety precautions awareness  -CL     Personal Safety Interventions (P)  fall prevention program maintained;gait belt;nonskid shoes/slippers when out of bed  -CM2 fall prevention program maintained;nonskid shoes/slippers when out of bed;supervised activity  -CL     Recorded by [CM] Leni Aragon, PT Student 07/17/18 1544  [CM2] Leni Aragon, PT Student 07/17/18 1550 [CL] Carol Ladd, OT 07/17/18 1413     Row Name 07/17/18 1440             Safety Issues, Functional Mobility    Safety Issues Affecting Function (Mobility) (P)  safety precaution awareness  -CM      Recorded by [CM] Leni Aragon, PT Student 07/17/18 1550      Row Name 07/17/18 1440 07/17/18 1340          Bed Mobility Assessment/Treatment    Bed Mobility Assessment/Treatment (P)  supine-sit  -CM supine-sit;sit-supine;scooting/bridging  -CL     Scooting/Bridging [place] (Bed Mobility)  -- conditional independence   to HOB w/ bed flat  -CL     Supine-Sit [place] (Bed Mobility) (P)   conditional independence  -CM conditional independence  -CL     Sit-Supine [place] (Bed Mobility)  -- conditional independence  -CL     Assistive Device (Bed Mobility)  -- bed rails;leg   -CL     Recorded by [CM] Leni Aragon, PT Student 07/17/18 1550 [CL] Carol Ladd, OT 07/17/18 1413     Row Name 07/17/18 1340             Functional Mobility    Functional Mobility- Comment Defer to PT.   -CL      Recorded by [CL] Carol Ladd, OT 07/17/18 1413      Row Name 07/17/18 1440 07/17/18 1340          Transfer Assessment/Treatment    Transfer Assessment/Treatment (P)  stand-sit transfer;sit-stand transfer  -CM  --     Comment (Transfers)  -- Defer to PT.   -CL     Recorded by [CM] Leni Aragon, PT Student 07/17/18 1550 [CL] Carol Ladd, OT 07/17/18 1413     Row Name 07/17/18 1440             Sit-Stand Transfer    Sit-Stand [place] (Transfers) (P)  contact guard;2 person assist  -CM      Assistive Device (Sit-Stand Transfers) (P)  walker, front-wheeled  -CM      Recorded by [CM] Leni Aragon, PT Student 07/17/18 1550      Row Name 07/17/18 1440             Stand-Sit Transfer    Stand-Sit [place] (Transfers) (P)  contact guard;2 person assist  -CM      Assistive Device (Stand-Sit Transfers) (P)  walker, front-wheeled  -CM      Recorded by [CM] Leni Aragon, PT Student 07/17/18 1550      Row Name 07/17/18 1440             Gait/Stairs Assessment/Training    35044 - Gait Training Minutes  (P)  15  -CM      Gait/Stairs Assessment/Training (P)  gait/ambulation assistive device  -CM      [place] Level (Gait) (P)  minimum assist (75% patient effort);2 person assist;verbal cues  -CM2      Assistive Device (Gait) (P)  walker, front-wheeled  -CM      Distance in Feet (Gait) (P)  15  -CM      Pattern (Gait) (P)  step-through  -CM      Deviations/Abnormal Patterns (Gait) (P)  base of support, narrow;gait speed decreased;stride length decreased  -CM      Bilateral Gait Deviations (P)  heel strike  decreased  -CM      Comment (Gait/Stairs) (P)  Pt ambulated 15 ft using FWW and min A x 2 for wt shift and occasional advancement of LE's. Pt demonstrated decreased geel strike and req'd VC's for wt shifting and proper managment of RW.   -CM3      Recorded by [CM] Leni Aragon, PT Student 07/17/18 1550  [CM2] Leni Aragon, PT Student 07/17/18 1554  [CM3] Leni Aragon, PT Student 07/17/18 1555      Row Name 07/17/18 1340             ADL Assessment/Intervention    67713 - OT Self Care/Mgmt Minutes 10  -CL      BADL Assessment/Intervention lower body dressing  -CL      Recorded by [CL] Carol Ladd OT 07/17/18 1413      Row Name 07/17/18 1340             Lower Body Dressing Assessment/Training    Lower Body Dressing [place] Level don;doff;socks;independent  -CL      Assistive Devices (Lower Body Dressing) long-handled shoe horn  -CL      Lower Body Dressing Position edge of bed sitting  -CL      Comment (Lower Body Dressing) Pt performed side sitting technique, educated on  use of LH shoe horn, would benefit from attempting don/doff shoes.   -CL      Recorded by [CL] Carol Ladd OT 07/17/18 1413      Row Name 07/17/18 1440             Motor Skills Assessment/Interventions    Additional Documentation (P)  Balance (Group);Therapeutic Exercise (Group)  -CM      Recorded by [CM] Leni Aragon, PT Student 07/17/18 1550      Row Name 07/17/18 1440 07/17/18 1340          Therapeutic Exercise    Therapeutic Exercise (P)  other (see comments)  -CM  --     Additional Documentation (P)  Therapeutic Exercise (Row)  -CM  --     47881 - PT Therapeutic Exercise Minutes (P)  8  -CM  --     20700 - OT Therapeutic Activity Minutes  -- 5  -CL     Recorded by [CM] Leni Aragon, PT Student 07/17/18 1550 [CL] Carol Ladd OT 07/17/18 1413     Row Name 07/17/18 1440             Therapeutic Exercise    Comment (Therapeutic Exercise) (P)  provided pt w/ HEP handout and went through each exercise w/ pt. Educated pt on  w/c propulsion technique and transfer training   -CM      Recorded by [CM] Leni Aragon, PT Student 07/17/18 1550      Row Name 07/17/18 1440             Balance    Balance (P)  static sitting balance;static standing balance  -CM      Recorded by [CM] Leni Aragon, PT Student 07/17/18 1550      Row Name 07/17/18 1440 07/17/18 1340          Static Sitting Balance    Level of [place] (Unsupported Sitting, Static Balance) (P)  supervision  -CM independent  -CL     Sitting Position (Unsupported Sitting, Static Balance) (P)  sitting in wheelchair  -CM sitting on edge of bed  -CL     Recorded by [CM] Leni Aragon, PT Student 07/17/18 1550 [CL] Carol Ladd, VIRI 07/17/18 1413     Row Name 07/17/18 1440             Static Standing Balance    Level of [place] (Supported Standing, Static Balance) (P)  contact guard assist  -CM      Assistive Device Utilized (Supported Standing, Static Balance) (P)  rolling walker  -CM      Recorded by [CM] Leni Aragon, PT Student 07/17/18 1550      Row Name 07/17/18 1440 07/17/18 1340          Positioning and Restraints    Pre-Treatment Position (P)  in bed  -CM in bed  -CL     Post Treatment Position (P)  wheelchair  -CM bed  -CL     In Bed  -- fowlers;call light within reach;encouraged to call for assist;notified nsg   RN deferred exit alarm  -CL     In Wheelchair (P)  sitting;call light within reach;exit alarm on  -CM  --     Recorded by [CM] Leni Aragon, PT Student 07/17/18 1550 [CL] Carol Ladd OT 07/17/18 1413     Row Name 07/17/18 1440             Pain Assessment    Additional Documentation (P)  Pain Scale: Numbers Pre/Post-Treatment (Group)  -CM      Recorded by [CM] Leni Aragon, PT Student 07/17/18 1550      Row Name 07/17/18 1440 07/17/18 1340          Pain Scale: Numbers Pre/Post-Treatment    Pain Scale: Numbers, Pretreatment (P)  0/10 - no pain  -CM 0/10 - no pain  -CL     Pain Scale: Numbers, Post-Treatment (P)  0/10 - no pain  -CM 0/10 - no  pain  -CL     Recorded by [CM] Leni Aragon, PT Student 07/17/18 1550 [CL] Carol Ladd, OT 07/17/18 1413     Row Name                Wound 07/13/18 0638 Left upper thigh abrasion    Wound - Properties Group Date first assessed: 07/13/18 [KR] Time first assessed: 0638 [KR] Present On Admission : yes [KR] Side: Left [KR] Orientation: upper [KR] Location: thigh [KR] Type: abrasion [KR] Recorded by:  [KR] Joanne Soares RN 07/13/18 0638    Row Name 07/17/18 1440             Coping    Observed Emotional State (P)  calm;cooperative;quiet  -CM      Verbalized Emotional State (P)  acceptance  -CM      Recorded by [CM] Leni Aragon, PT Student 07/17/18 1550      Row Name 07/17/18 1440             Plan of Care Review    Plan of Care Reviewed With (P)  patient  -CM      Recorded by [CM] Leni Aragon, PT Student 07/17/18 1550      Row Name 07/17/18 1440             Outcome Summary/Treatment Plan (PT)    Daily Summary of Progress (PT) (P)  progress toward functional goals is good  -CM      Recorded by [CM] Leni Aragon, PT Student 07/17/18 1550        User Key  (r) = Recorded By, (t) = Taken By, (c) = Cosigned By    Initials Name Effective Dates Discipline    KR Joanne Soares RN 06/16/16 -  Nurse    CL Carol Ladd, OT 04/03/18 -  OT    CM Leni Aragon, PT Student 06/07/18 -  PT          Wound 07/13/18 0638 Left upper thigh abrasion (Active)   Dressing Appearance dry;open to air 7/17/2018  8:00 AM   Periwound intact 7/17/2018  8:00 AM   Dressing Care, Wound open to air 7/16/2018  8:00 PM             Physical Therapy Education     Title: PT OT SLP Therapies (Active)     Topic: Physical Therapy (Active)     Point: Mobility training (Active)    Learning Progress Summary     Learner Status Readiness Method Response Comment Documented by    Patient Active Acceptance E,H NR  CM 07/17/18 1440     Active Acceptance E NR  CM 07/16/18 1126     Active Acceptance E,D NR  LS 07/13/18 1511    Significant Other Active  Acceptance E,D NR  LS 07/13/18 1511          Point: Home exercise program (Active)    Learning Progress Summary     Learner Status Readiness Method Response Comment Documented by    Patient Active Acceptance E,H NR  CM 07/17/18 1440     Active Acceptance E NR  CM 07/16/18 1126     Active Acceptance E,D NR  LS 07/13/18 1511    Significant Other Active Acceptance E,D NR  LS 07/13/18 1511          Point: Body mechanics (Active)    Learning Progress Summary     Learner Status Readiness Method Response Comment Documented by    Patient Active Acceptance E,H NR  CM 07/17/18 1440     Active Acceptance E NR  CM 07/16/18 1126     Active Acceptance E,D NR  LS 07/13/18 1511    Significant Other Active Acceptance E,D NR  LS 07/13/18 1511          Point: Precautions (Active)    Learning Progress Summary     Learner Status Readiness Method Response Comment Documented by    Patient Active Acceptance E,H NR  CM 07/17/18 1440     Active Acceptance E NR  CM 07/16/18 1126     Active Acceptance E,D NR  LS 07/13/18 1511    Significant Other Active Acceptance E,D NR  LS 07/13/18 1511                      User Key     Initials Effective Dates Name Provider Type Discipline     06/19/15 -  Zenaida Dai, PT Physical Therapist PT     06/07/18 -  Leni Aragon, PT Student PT Student PT                    PT Recommendation and Plan     Outcome Summary/Treatment Plan (PT)  Daily Summary of Progress (PT): (P) progress toward functional goals is good  Plan of Care Reviewed With: (P) patient  Progress: (P) improving  Outcome Summary: (P) Pt progressed to ambulating 15 ft using FWW and  Min A x2. Pt limited from decreased proprioception, decreased sensory integration, and fatigue. Provided pt w/ education for HEP, transfer training, and w/c propulsion. Pt would benefit from cont'd skilled PT services for safe d/c home.           Outcome Measures     Row Name 07/17/18 1440 07/17/18 1340 07/16/18 1450       How much help from another person do  you currently need...    Turning from your back to your side while in flat bed without using bedrails? (P)  3  -CM  --  --    Moving from lying on back to sitting on the side of a flat bed without bedrails? (P)  3  -CM  --  --    Moving to and from a bed to a chair (including a wheelchair)? (P)  3  -CM  --  --    Standing up from a chair using your arms (e.g., wheelchair, bedside chair)? (P)  3  -CM  --  --    Climbing 3-5 steps with a railing? (P)  1  -CM  --  --    To walk in hospital room? (P)  3  -CM  --  --    AM-PAC 6 Clicks Score (P)  16  -CM  --  --       How much help from another is currently needed...    Putting on and taking off regular lower body clothing?  -- 3  -CL 2  -CL    Bathing (including washing, rinsing, and drying)  -- 2  -CL 2  -CL    Toileting (which includes using toilet bed pan or urinal)  -- 2  -CL 2  -CL    Putting on and taking off regular upper body clothing  -- 3  -CL 3  -CL    Taking care of personal grooming (such as brushing teeth)  -- 4  -CL 4  -CL    Eating meals  -- 4  -CL 4  -CL    Score  -- 18  -CL 17  -CL       Functional Assessment    Outcome Measure Options (P)  AM-PAC 6 Clicks Basic Mobility (PT)  -CM AM-PAC 6 Clicks Daily Activity (OT)  -CL AM-PAC 6 Clicks Daily Activity (OT)  -CL    Row Name 07/16/18 1126 07/15/18 1516          How much help from another person do you currently need...    Turning from your back to your side while in flat bed without using bedrails? 3  -LS (r) CM (t) LS (c)  --     Moving from lying on back to sitting on the side of a flat bed without bedrails? 3  -LS (r) CM (t) LS (c)  --     Moving to and from a bed to a chair (including a wheelchair)? 2  -LS (r) CM (t) LS (c)  --     Standing up from a chair using your arms (e.g., wheelchair, bedside chair)? 3  -LS (r) CM (t) LS (c)  --     Climbing 3-5 steps with a railing? 1  -LS (r) CM (t) LS (c)  --     To walk in hospital room? 1  -LS (r) CM (t) LS (c)  --     AM-PAC 6 Clicks Score 13  -LS (r) CM  (t)  --        How much help from another is currently needed...    Putting on and taking off regular lower body clothing?  -- 2  -JR     Bathing (including washing, rinsing, and drying)  -- 2  -JR     Toileting (which includes using toilet bed pan or urinal)  -- 2  -JR     Putting on and taking off regular upper body clothing  -- 3  -JR     Taking care of personal grooming (such as brushing teeth)  -- 4  -JR     Eating meals  -- 4  -JR     Score  -- 17  -JR        Functional Assessment    Outcome Measure Options AM-PAC 6 Clicks Basic Mobility (PT)  -LS (r) CM (t) LS (c) AM-PAC 6 Clicks Daily Activity (OT)  -JR       User Key  (r) = Recorded By, (t) = Taken By, (c) = Cosigned By    Initials Name Provider Type    JR Abigail Godoy, OT Occupational Therapist    LS Zenaida Dai, PT Physical Therapist    CL Carol Ladd, OT Occupational Therapist    CM Leni Aragon, PT Student PT Student           Time Calculation:         PT Charges     Row Name 07/17/18 1440             Time Calculation    Start Time (P)  1440  -CM      PT Received On (P)  07/17/18  -CM      PT Goal Re-Cert Due Date (P)  07/23/18  -CM         Time Calculation- PT    Total Timed Code Minutes- PT (P)  23 minute(s)  -CM         Timed Charges    73674 - PT Therapeutic Exercise Minutes (P)  8  -CM      71727 - Gait Training Minutes  (P)  15  -CM        User Key  (r) = Recorded By, (t) = Taken By, (c) = Cosigned By    Initials Name Provider Type    SYL Aragon, PT Student PT Student        Therapy Suggested Charges     Code   Minutes Charges    82734 (CPT®) Hc Pt Neuromusc Re Education Ea 15 Min      21617 (CPT®) Hc Pt Ther Proc Ea 15 Min 8 1    70781 (CPT®) Hc Gait Training Ea 15 Min 15 1    18660 (CPT®) Hc Pt Therapeutic Act Ea 15 Min      79095 (CPT®) Hc Pt Manual Therapy Ea 15 Min      48668 (CPT®) Hc Pt Iontophoresis Ea 15 Min      35861 (CPT®) Hc Pt Elec Stim Ea-Per 15 Min      20672 (CPT®) Hc Pt Ultrasound Ea 15 Min      73267 (CPT®) Hc  Pt Self Care/Mgmt/Train Ea 15 Min      Total  23 2        Therapy Charges for Today     Code Description Service Date Service Provider Modifiers Qty    08859869639 HC PT THER PROC EA 15 MIN 7/16/2018 Leni Aragon, PT Student GP 1    71743780934 HC PT THERAPEUTIC ACT EA 15 MIN 7/16/2018 Leni Aragon, PT Student GP 1    84424269770 HC PT THER PROC EA 15 MIN 7/17/2018 Leni Aragon, PT Student GP 1    36875008846 HC GAIT TRAINING EA 15 MIN 7/17/2018 Leni Aragon, PT Student GP 1    48902229519 HC PT THER SUPP EA 15 MIN 7/17/2018 Leni Aragon, PT Student GP 1          PT G-Codes  Outcome Measure Options: (P) AM-PAC 6 Clicks Basic Mobility (PT)  Functional Limitation: Mobility: Walking and moving around  Mobility: Walking and Moving Around Current Status (): At least 60 percent but less than 80 percent impaired, limited or restricted  Mobility: Walking and Moving Around Goal Status (): At least 40 percent but less than 60 percent impaired, limited or restricted    Leni Aragon, PT Student  7/17/2018

## 2018-07-17 NOTE — PLAN OF CARE
Problem: Patient Care Overview  Goal: Plan of Care Review  Outcome: Ongoing (interventions implemented as appropriate)   07/17/18 1414   Coping/Psychosocial   Plan of Care Reviewed With patient   Patient Care Overview   IRF Plan of Care Review progress ongoing, continue   Progress, Functional Goals demonstrating adequate progress   OTHER   Outcome Summary Pt demo improved independence this date by performing bed mobility w/ conditional independence and LBD independently w/ adaptive techniques. Recommend cont skilled IPOT POC.

## 2018-07-18 NOTE — PAYOR COMM NOTE
"Isidra Curry (23 y.o. Female)     Date of Birth Social Security Number Address Home Phone MRN    1995  [address]   [facility] [address] [phone] [id number]    M[employer] Marital Status          None        Admission Date Admission Type Admitting Provider Attending Provider Department, Room/Bed    18 Emergency Sonia Perez MD  [facility] 5B, N532/1    Discharge Date Discharge Disposition Discharge Destination        2018 Home or Self Care              Attending Provider:  (none)   Allergies:  No Known Allergies    Isolation:  None   Infection:  None   Code Status:  Prior    Ht:  168.9 cm (66.5\")   Wt:  101 kg (221 lb 9 oz)    Admission Cmt:  None   Principal Problem:  Lower extremity weakness [R29.898]                 Active Insurance as of 2018     Primary Coverage     Payor Plan Insurance Group Employer/Plan Group    [employer] [place] [employer] [place]      Payor Plan Address Payor Plan Phone Number Effective From Effective To    [address] [address] [phone] 6/15/2017     [facility] [id number]       Subscriber Name Subscriber Birth Date Member ID       ISIDRA CURRY 1995 [id number]                 Emergency Contacts      (Rel.) Home Phone Work Phone Mobile Phone    Wilber Basilio (Spouse) [phone] -- --               Discharge Summary      Sonia Perez MD at 2018  2:12 PM              [facility] Medicine Services  DISCHARGE SUMMARY    Patient Name: Isidra Curry  : 1995  MRN: [id number]    Date of Admission: 2018  Date of Discharge:  2018  Primary Care Physician: Evelyne Myrick MD    Consults     Date and Time Order Name Status Description    2018 0558 Inpatient Neurology Consult Completed         Hospital Course     Presenting Problem:   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 female otherwise healthy or history of asthma, presented to [facility] with 4-5 days of progressively worsening lower extremity weakness to the point of paralysis, she denied any trauma, did endorse some thing bruising and viral syndrome. The etiology of her bilateral lower extremity weakness/paralysis, was unknown, the ddx included autoimmune vs Guillain-Barré syndrome vs conversion disorder. Neurology was consulted their impression was that this could be myelopathic process, metabolic/autoimmune process, or possible conversion disorder, GBS was unlikely as she had intact reflexes. Work-up included MRI L/C/T-spine that were unrevealing, LP was done, CSF finding was normal,  an EMG/NCS was done that showed mild peroneal neuropathy on left, as such its very likely that this is a conversion d/o. TIMMY was negative, ANCA panel and porphyrin were pending. Rehab was recommended. CM and SW was involved, patient wanted a rehab close to home however this was not possible sec to her insurance. She insisted on being close to home, as such SW arranged wheelchair and outpatient PT at [facility].    Day of Discharge     HPI: No acute events overnight, patient states that she is doing ok, has had some slight improvement.    Review of Systems  Gen- No fevers, chills  CV- No chest pain, palpitations  Resp- No cough, dyspnea  GI- No N/V/D, abd pain    Otherwise ROS is negative except as mentioned in the HPI.    Vital Signs:   Temp:  [97.7 °F (36.5 °C)-98.4 °F (36.9 °C)] 97.7 °F (36.5 °C)  Heart Rate:  [75-93]  75  Resp:  [16-18] 16  BP: (105-120)/(59-70) 109/63     Physical Exam:  Constitutional: No acute distress, awake, alert  HENT: NCAT, mucous membranes moist  Respiratory: Clear to auscultation bilaterally, respiratory effort normal   Cardiovascular: RRR, no murmurs, rubs, or gallops, palpable pedal pulses bilaterally  Gastrointestinal: Positive bowel sounds, soft, nontender, nondistended  Musculoskeletal: No bilateral ankle edema  Psychiatric: Appropriate affect, cooperative  Neurologic: Oriented x 3, BLE  weakness  Skin: No rashes    Pertinent  and/or Most Recent Results       Results from last 7 days  Lab Units 07/13/18  0049   WBC 10*3/mm3 7.99   HEMOGLOBIN g/dL 13.3   HEMATOCRIT % 40.1   PLATELETS 10*3/mm3 305   SODIUM mmol/L 139   POTASSIUM mmol/L 3.8   CHLORIDE mmol/L 108   CO2 mmol/L 25.0   BUN mg/dL 8*   CREATININE mg/dL 0.82   GLUCOSE mg/dL 108*   CALCIUM mg/dL 8.3*       Results from last 7 days  Lab Units 07/13/18  0049   BILIRUBIN mg/dL 0.6   ALK PHOS U/L 86   ALT (SGPT) U/L 13   AST (SGOT) U/L 13           Invalid input(s): TG, LDLCALC, LDLREALC      Brief Urine Lab Results  (Last result in the past 365 days)      Color   Clarity   Blood   Leuk Est   Nitrite   Protein   CREAT   Urine HCG        07/13/18 0110               Negative           Microbiology Results Abnormal     Procedure Component Value - Date/Time    Culture, CSF - Cerebrospinal Fluid, Lumbar Puncture [[id number]]  (Normal) Collected:  07/13/18 0317    Lab Status:  Preliminary result Specimen:  Cerebrospinal Fluid from Lumbar Puncture Updated:  07/17/18 1105     CSF Culture No growth at 4 days     Gram Stain Result No WBCs or organisms seen          Imaging Results (all)     Procedure Component Value Units Date/Time    MRI Thoracic Spine With & Without Contrast [[id number]] Collected:  07/13/18 2147     Updated:  07/13/18 2152    Narrative:       EXAMINATION: MRI THORACIC SPINE W WO CONTRAST-     INDICATION: BLE weakness; G61.3-Iyuwfgtk-Rpang  syndrome; Z74.09-Other  reduced mobility; Z74.09-Other reduced mobility      TECHNIQUE: Multiplanar MRI of the thoracic spine with and without  intravenous contrast administration     COMPARISON: NONE     FINDINGS: Sagittal datasets reveal normal alignment of the thoracic  segments without focal subluxation. Preservation of vertebral body  heights and intervertebral disc height spaces with disc well hydrated.  Facets are well aligned.     Axial dataset evaluation without paraspinal hematoma or paraspinal soft  tissue abnormality of concern. No focal fluid collection is identified  within the paraspinal region.     Axial datasets evaluation of the cord demonstrate normal signal without  cord edema or myelomalacia. No intrathecal mass occupying lesion. Noted  spinal canal stenosis or neuroforaminal stenosis identified.     Postcontrast administration sequences without abnormal enhancement  identified specifically no abnormal cord enhancement or intrathecal  enhancement of concern. No abnormal bone marrow signal or enhancement  within the osseous structures.          Impression:       No acute pathology of the thoracic spine with normal bone  marrow signal and intrinsic signal of the thoracic cord. No abnormal  enhancement intrathecal or thoracic cord enhancement. No significant  spinal canal or neuroforaminal stenosis.         This report was finalized on 7/13/2018 9:50 PM by Dr. Eliot Esposito.       MRI Cervical Spine With & Without Contrast [[id number]] Collected:  07/13/18 2144     Updated:  07/13/18 2149    Narrative:       EXAMINATION: MRI CERVICAL SPINE W WO CONTRAST-     INDICATION: BLE weakness; G61.0-Kndgnlsi-Gnlcv syndrome; Z74.09-Other  reduced mobility; Z74.09-Other reduced mobility      TECHNIQUE: Multiplanar MRI of the cervical spine with and without  intravenous contrast administration     COMPARISON: NONE     FINDINGS:      Sagittal datasets reveal flattening of the normal cervical lordotic  curvature  without focal subluxation. Preservation of vertebral body  heights with normal appearance of the bone marrow signal.  Cervicomedullary junction widely patent. Axial datasets evaluation for  paraspinal soft tissue findings without paraspinal soft tissue  abnormality of concern specifically no paraspinal hematoma or fluid  collection.     Axial datasets evaluation for degenerative/spondylitic changes without  significant spinal canal or neuroforaminal stenosis identified within  the cervical levels. Normal signal within the cervical cord. No syrinx  or cord edema identified.     Postcontrast administration sequences without abnormal enhancement  specifically no abnormal enhancing intrathecal mass lesion or abnormal  enhancement within the cervical cord.          Impression:       Mild flattening of the cervical lordotic curvature maps and  muscular spasm and/or patient positioning. No acute pathology of the  cervical spine otherwise identified specifically no abnormal enhancement  or intrathecal space-occupying lesion. Normal cord signal without  stenosis and spinal canal or neuroforaminal stenosis.     This report was finalized on 7/13/2018 9:47 PM by Dr. Eliot Esposito.       IR Lumbar Puncture Diagnosis [[id number]] Collected:  07/13/18 0802     Updated:  07/13/18 0846    Narrative:       EXAMINATION: IR LUMBAR PUNCTURE DIAGNOSIS-     INDICATION: Neuropathy.     COMPARISON: None.       Impression:       FINDINGS/IMPRESSION: 17 seconds of fluoroscopy used for localization of  the L4-L5 disc space by Dr. Suarez for lumbar puncture. Please see the  procedural report for full details.     D:  07/13/2018  E:  07/13/2018     This report was finalized on 7/13/2018 8:44 AM by Dr. Haley Dorado MD.       MRI Brain With & Without Contrast [[id number]] Collected:  07/13/18 0014     Updated:  07/13/18 0326    Narrative:       EXAM:    MR Head Without And With Intravenous Contrast    CLINICAL HISTORY:    23 years old,  female; Signs and symptoms; Other: HX of migraines; Patient HX:   Neuro deficits unable to feel and use bilateral lower extremities HX of   migraines 19 ml multihance administered; Additional info: Neuro deficit(s),   subacute. Neuro deficits unable to feel and use bilateral lower extremities HX   of migraines 19 ml multihance administered    TECHNIQUE:    Magnetic resonance images of the head/brain without and with intravenous   contrast in multiple planes.    CONTRAST:    19 mL of Multihance administered intravenously.      COMPARISON:    No relevant prior studies available.    FINDINGS:    Brain parenchyma demonstrates normal signal intensity and enhancement without   an intra- or extra-axial mass or abnormality. No mass effect or midline shift.   No evidence of restricted diffusion in the supra-or infratentorial brain.      Midline brain structures including the corpus callosum, pituitary gland,   pineal region, and craniovertebral junction are unremarkable. Ventricles and   sulci are normal without evidence of hydrocephalus. Intracranial vessels   demonstrate a normal flow-void.    Impression:         Unremarkable study without a supra-, infratentorial mass or abnormality; no   evidence of hemorrhagic or ischemic infarct and no hydrocephalus.    THIS DOCUMENT HAS BEEN ELECTRONICALLY SIGNED BY MG NEAL MD    MRI Lumbar Spine Without Contrast [[id number]] Collected:  07/13/18 0002     Updated:  07/13/18 0323    Narrative:       EXAM:    MR Lumbar Spine Without Intravenous Contrast    CLINICAL HISTORY:    23 years old, female; Signs and symptoms; Other: Lbp; Patient HX: Lower back   pain unable to feel bilateral lower extremities; Additional info: Low back   pain, rapidly progressive neuro deficit. Lower back pain unable to feel   bilateral lower extremities    TECHNIQUE:    Magnetic resonance images of the lumbar spine without intravenous contrast in   multiple planes.    COMPARISON:    No relevant prior  studies available.    FINDINGS:    Normal curvature of the spine, alignment of the vertebral bodies is normal.   Conus ends normally at the level of L1 with normal-appearing cauda equina   without evidence of compression.      Small S1 vertebral body hemangioma. Marrow shows normal signal intensity   without evidence of acute/subacute vertebral compression fracture or deformity.   No evidence of a pars defect or pars fracture.      From T11-T12 to L5-S1 intervertebral discs are normal, no focal disc   herniation, no significant spinal stenosis or neural foramina narrowing.      Visualized sacrum, iliac bones and sacroiliac joints are unremarkable.   Pre-and paravertebral soft tissues are grossly normal. Visualized aorta is   unremarkable.    Impression:         Normal study, normal appearing cauda equina and conus without evidence of   compression or focal abnormality.    THIS DOCUMENT HAS BEEN ELECTRONICALLY SIGNED BY MG NEAL MD           Order Current Status    ANCA Panel In process    Porphyrin, Total In process    Culture, CSF - Cerebrospinal Fluid, Lumbar Puncture Preliminary result        Discharge Details        Discharge Medications      Continue These Medications      Instructions Start Date   albuterol (2.5 MG/3ML) 0.083% nebulizer solution  Commonly known as:  PROVENTIL   2.5 mg, Nebulization, Every 4 Hours PRN      amitriptyline 10 MG tablet  Commonly known as:  ELAVIL   10-20 mg, Oral, Nightly      magnesium oxide 400 (241.3 Mg) MG tablet tablet  Commonly known as:  MAGOX   400 mg, Oral, Nightly      SUMAtriptan 50 MG tablet  Commonly known as:  IMITREX   Take one tablet at onset of headache. May repeat dose one time in 2 hours if headache not relieved.      TRI-PREVIFEM 0.18/0.215/0.25 MG-35 MCG per tablet  Generic drug:  norgestimate-ethinyl estradiol   1 tablet, Oral, Daily             Discharge Disposition:  Home or Self Care    Discharge Diet:  Diet Instructions     Advance Diet As Tolerated              Discharge Activity:   Activity Instructions     Activity as Tolerated           Special Instructions: None    Code Status/Level of Support:  Code Status and Medical Interventions:   Ordered at: 07/13/18 0418     Level Of Support Discussed With:    Patient     Code Status:    CPR     Medical Interventions (Level of Support Prior to Arrest):    Full     No future appointments.    Additional Instructions for the Follow-ups that You Need to Schedule     Ambulatory Referral to Physical Therapy Evaluate and treat    As directed      Specialty needed:  Evaluate and treat         Discharge Follow-up with PCP    As directed      Currently Documented PCP:  Evelyne Myrick MD  PCP Phone Number:  [phone]    Follow Up Details:  PCP in 1 week post-hospitalization               Time Spent on Discharge: 40 minutes    Electronically signed by Sonia Perez MD, 07/17/18, 2:12 PM.      Electronically signed by Sonia Perez MD at 7/17/2018  2:32 PM

## 2018-07-19 LAB
C-ANCA TITR SER IF: NORMAL TITER
MYELOPEROXIDASE AB SER-ACNC: <9 U/ML (ref 0–9)
P-ANCA ATYPICAL TITR SER IF: NORMAL TITER
P-ANCA TITR SER IF: NORMAL TITER
PROTEINASE3 AB SER IA-ACNC: <3.5 U/ML (ref 0–3.5)
S PORPHYRINS: <0.1 UG/DL (ref 0–1)

## 2018-07-20 LAB
BACTERIA SPEC AEROBE CULT: NORMAL
GRAM STN SPEC: NORMAL

## 2019-01-29 DIAGNOSIS — Z30.011 BCP (BIRTH CONTROL PILLS) INITIATION: ICD-10-CM

## 2019-01-29 RX ORDER — NORGESTIMATE AND ETHINYL ESTRADIOL
KIT
Qty: 28 TABLET | Refills: 11 | Status: SHIPPED | OUTPATIENT
Start: 2019-01-29

## 2021-11-02 NOTE — PLAN OF CARE
Problem: Fall Risk (Adult)  Goal: Absence of Fall  Outcome: Ongoing (interventions implemented as appropriate)     07/13/18 0623   Fall Risk (Adult)   Absence of Fall other (see comments)  (new admission)   Up to chair with pt today.       no